# Patient Record
Sex: FEMALE | Race: WHITE | NOT HISPANIC OR LATINO | Employment: FULL TIME | URBAN - METROPOLITAN AREA
[De-identification: names, ages, dates, MRNs, and addresses within clinical notes are randomized per-mention and may not be internally consistent; named-entity substitution may affect disease eponyms.]

---

## 2017-11-16 PROBLEM — C50.811 MALIGNANT NEOPLASM OF OVERLAPPING SITES OF BOTH BREASTS IN FEMALE, ESTROGEN RECEPTOR POSITIVE: Status: ACTIVE | Noted: 2017-11-16

## 2017-11-16 PROBLEM — Z17.0 MALIGNANT NEOPLASM OF OVERLAPPING SITES OF BOTH BREASTS IN FEMALE, ESTROGEN RECEPTOR POSITIVE: Status: ACTIVE | Noted: 2017-11-16

## 2017-11-16 PROBLEM — C50.812 MALIGNANT NEOPLASM OF OVERLAPPING SITES OF BOTH BREASTS IN FEMALE, ESTROGEN RECEPTOR POSITIVE: Status: ACTIVE | Noted: 2017-11-16

## 2017-12-21 ENCOUNTER — TELEPHONE (OUTPATIENT)
Dept: HEMATOLOGY/ONCOLOGY | Facility: CLINIC | Age: 62
End: 2017-12-21

## 2017-12-21 NOTE — TELEPHONE ENCOUNTER
----- Message from Tootie Marquez sent at 12/21/2017 11:29 AM CST -----  Contact: pt  Pt calling states to see it the office got her referral from surgeon Dr Cardenas....[phone]

## 2017-12-22 ENCOUNTER — OFFICE VISIT (OUTPATIENT)
Dept: HEMATOLOGY/ONCOLOGY | Facility: CLINIC | Age: 62
End: 2017-12-22
Payer: COMMERCIAL

## 2017-12-22 VITALS
SYSTOLIC BLOOD PRESSURE: 132 MMHG | HEART RATE: 83 BPM | DIASTOLIC BLOOD PRESSURE: 78 MMHG | RESPIRATION RATE: 16 BRPM | HEIGHT: 64 IN | WEIGHT: 183.19 LBS | TEMPERATURE: 98 F | BODY MASS INDEX: 31.27 KG/M2

## 2017-12-22 DIAGNOSIS — C50.011 MALIGNANT NEOPLASM OF NIPPLE OF RIGHT BREAST IN FEMALE, UNSPECIFIED ESTROGEN RECEPTOR STATUS: Primary | ICD-10-CM

## 2017-12-22 DIAGNOSIS — T45.1X5A ANTINEOPLASTIC CHEMOTHERAPY INDUCED ANEMIA: ICD-10-CM

## 2017-12-22 DIAGNOSIS — T45.1X5A CHEMOTHERAPY INDUCED NEUTROPENIA: ICD-10-CM

## 2017-12-22 DIAGNOSIS — C50.811 MALIGNANT NEOPLASM OF OVERLAPPING SITES OF RIGHT FEMALE BREAST, UNSPECIFIED ESTROGEN RECEPTOR STATUS: Primary | ICD-10-CM

## 2017-12-22 DIAGNOSIS — D70.1 CHEMOTHERAPY INDUCED NEUTROPENIA: ICD-10-CM

## 2017-12-22 DIAGNOSIS — E78.00 PURE HYPERCHOLESTEROLEMIA: ICD-10-CM

## 2017-12-22 DIAGNOSIS — Z17.0 MALIGNANT NEOPLASM OF OVERLAPPING SITES OF BOTH BREASTS IN FEMALE, ESTROGEN RECEPTOR POSITIVE: ICD-10-CM

## 2017-12-22 DIAGNOSIS — D64.81 ANTINEOPLASTIC CHEMOTHERAPY INDUCED ANEMIA: ICD-10-CM

## 2017-12-22 DIAGNOSIS — I10 BENIGN ESSENTIAL HTN: ICD-10-CM

## 2017-12-22 DIAGNOSIS — C50.811 MALIGNANT NEOPLASM OF OVERLAPPING SITES OF BOTH BREASTS IN FEMALE, ESTROGEN RECEPTOR POSITIVE: ICD-10-CM

## 2017-12-22 DIAGNOSIS — C50.812 MALIGNANT NEOPLASM OF OVERLAPPING SITES OF BOTH BREASTS IN FEMALE, ESTROGEN RECEPTOR POSITIVE: ICD-10-CM

## 2017-12-22 PROCEDURE — 99999 PR PBB SHADOW E&M-EST. PATIENT-LVL III: CPT | Mod: PBBFAC,,, | Performed by: INTERNAL MEDICINE

## 2017-12-22 PROCEDURE — 99205 OFFICE O/P NEW HI 60 MIN: CPT | Mod: S$GLB,,, | Performed by: INTERNAL MEDICINE

## 2017-12-22 RX ORDER — PROCHLORPERAZINE MALEATE 10 MG
10 TABLET ORAL EVERY 6 HOURS PRN
Qty: 30 TABLET | Refills: 1 | Status: SHIPPED | OUTPATIENT
Start: 2017-12-22 | End: 2017-12-22 | Stop reason: SDUPTHER

## 2017-12-22 RX ORDER — PROCHLORPERAZINE MALEATE 10 MG
10 TABLET ORAL EVERY 6 HOURS PRN
Qty: 30 TABLET | Refills: 1 | Status: SHIPPED | OUTPATIENT
Start: 2017-12-22 | End: 2017-12-29

## 2017-12-22 RX ORDER — ONDANSETRON 8 MG/1
8 TABLET, ORALLY DISINTEGRATING ORAL EVERY 12 HOURS PRN
Qty: 30 TABLET | Refills: 1 | Status: SHIPPED | OUTPATIENT
Start: 2017-12-22 | End: 2017-12-22 | Stop reason: SDUPTHER

## 2017-12-22 RX ORDER — ONDANSETRON 8 MG/1
8 TABLET, ORALLY DISINTEGRATING ORAL EVERY 12 HOURS PRN
Qty: 30 TABLET | Refills: 1 | Status: SHIPPED | OUTPATIENT
Start: 2017-12-22 | End: 2017-12-29

## 2017-12-22 RX ORDER — LIDOCAINE AND PRILOCAINE 25; 25 MG/G; MG/G
CREAM TOPICAL
Qty: 5 G | Refills: 0 | Status: SHIPPED | OUTPATIENT
Start: 2017-12-22 | End: 2018-01-05 | Stop reason: SDUPTHER

## 2017-12-22 RX ORDER — IBUPROFEN 800 MG/1
800 TABLET ORAL NIGHTLY PRN
Status: ON HOLD | COMMUNITY
End: 2018-10-18 | Stop reason: HOSPADM

## 2017-12-22 NOTE — LETTER
December 22, 2017      Monika Cardenas MD  [address]  [facility] [address]           [facility]  [address]  [facility] [address]  Phone: [phone]  Fax: [phone]          Patient: Vilma Cross   MR Number: [id number]   YOB: 1955   Date of Visit: 12/22/2017       Dear Dr. Monika Cardenas:    Thank you for referring Vilma Cross to me for evaluation. Attached you will find relevant portions of my assessment and plan of care.    If you have questions, please do not hesitate to call me. I look forward to following Vilma Cross along with you.    Sincerely,    Britney Mcintosh MD    Enclosure  CC:  No Recipients    If you would like to receive this communication electronically, please contact [email or web address] or [phone] to request more information on [employer] Link access.    For providers and/or their staff who would like to refer a patient to [facility], please contact us through our one-stop-shop provider referral line, [facility], at [phone].    If you feel you have received this communication in error or would no longer like to receive these types of communications, please e-mail [email or web address]

## 2017-12-23 NOTE — PROGRESS NOTES
Date of Service: 12/22/2017  Ga Cross is 62 years of age  woman here with her family for   consultation for newly diagnosed breast cancer.  The patient had self   examination and found a lump.  She has undergone bilateral mastectomies with Dr. Cardenas.  Pathology reveals three sentinel lymph nodes on the right side, one   of which showed micrometastases size of 0.8 mm without extranodal extension.    The patient shows invasive ductal carcinoma 1.4 cm with low-grade ductal   carcinoma in situ on the right side.  On the left breast, the patient shows no   evidence of lymph node metastases on sentinel lymph node evaluation, focal   low-grade LCIS and DCIS on the left side, but no invasive component.  The   patient's invasive ductal carcinoma total size is about 2.3 cm, status post   mastectomy.  She stages as a T2 N1 MX.  The patient has the invasive ductal   carcinoma showing ER positivity, RI positivity, and HER-2 negativity by FISH.    She obviously has multiple questions and concerns.  She is healing well.    FAMILY HISTORY:  Noncontributory.  There is heart disease and dyslipidemia,   strokes and asthma.    SOCIAL HISTORY:  Nonsmoker, no recreational drug or alcohol use.  Occasional   social alcohol only.    SURGICAL HISTORY:  Significant for cholecystectomy, tonsillectomy and bilateral   mastectomies now.    MEDICAL HISTORY:  Significant for dyslipidemia, hypertension, mitral valve   prolapse, seasonal allergies.    MEDICATIONS:  Include Norvasc, cyclobenzaprine, HydroDIURIL, ibuprofen.    ALLERGIES:  She is allergic to hydromorphone and penicillin.    PHYSICAL EXAMINATION:   VITAL SIGNS:  Reveals the patient with 183 pound weight.  BSA 1.94, height of 5   feet 4 inches, blood pressure 132/78, pulse 83, respirations 16, temperature   98.3.  Pain score of 5, weight 183 pounds.  GENERAL:  Awake, alert, oriented.  BREASTS:  Double mastectomy is healing well.  LUNGS:  Clear to auscultation.  No JVD.   Trachea is central.  CVS:  S1, S2 normally heard.  No murmurs or gallops.  ABDOMEN:  Soft.  No rebound, guarding or rigidity.  EXTREMITIES:  Without edema.  NEUROLOGICAL:  The patient is appropriate.    LABS:  Labs from 12/05/2017 shows CBC is intact.  BMP is within normal range,   acceptable blood sugar, potassium slightly low at 3.3.    PATHOLOGY:  As mentioned above.    IMPRESSION:  T2 N1 MX breast cancer with DCIS on left side and invasive   component on the right side.  ER/TN positive, HER-2 negative.    PLAN:  We will obtain PET scan in 2-3 weeks, start AC x4, followed by Taxol x12   after which she will go through radiation with Arimidex.  I will see the patient   in time for start date, chart prepared.  The patient undergo chemo class.  Port   needs to be placed.  Plans made.  Plan on starting in the next two to three   weeks.      SSS/HN  dd: 12/22/2017 16:37:15 (CST)  td: 12/23/2017 06:48:33 (CST)  Doc ID   #[id number]  Job ID #[id number]    CC:

## 2018-01-03 ENCOUNTER — TELEPHONE (OUTPATIENT)
Dept: HEMATOLOGY/ONCOLOGY | Facility: CLINIC | Age: 63
End: 2018-01-03

## 2018-01-03 ENCOUNTER — TELEPHONE (OUTPATIENT)
Dept: INFUSION THERAPY | Facility: HOSPITAL | Age: 63
End: 2018-01-03

## 2018-01-03 DIAGNOSIS — Z17.0 MALIGNANT NEOPLASM OF OVERLAPPING SITES OF RIGHT BREAST IN FEMALE, ESTROGEN RECEPTOR POSITIVE: Primary | ICD-10-CM

## 2018-01-03 DIAGNOSIS — C50.811 MALIGNANT NEOPLASM OF OVERLAPPING SITES OF RIGHT BREAST IN FEMALE, ESTROGEN RECEPTOR POSITIVE: Primary | ICD-10-CM

## 2018-01-03 NOTE — TELEPHONE ENCOUNTER
Spoke with pt regarding plans for echo, labs, chemo class, clinic visit and chemo.  Dates and time provided.  Verbalized understanding.

## 2018-01-03 NOTE — TELEPHONE ENCOUNTER
EDWIGEM to return call to Kelsie [phone].  Need to schedule chemo school and f/u on port placement.

## 2018-01-03 NOTE — TELEPHONE ENCOUNTER
-Debbie HIGHTOWER this am, will follow up this afternoon with another phone call.      ---- Message from Louise Ram sent at 1/3/2018  9:28 AM CST -----  Contact: patient  Vilma, patient [phone], calling about scheduling her next appointment after PET Scan. Please advise. Thanks.

## 2018-01-03 NOTE — TELEPHONE ENCOUNTER
F/U with Mrs. Cross on port placement of 1/2  - stated all went great.  Scheduled Chemo class on 1/10 - will check to see if chemo can be done on 1/11 and notfiy patient.  Pt verbalized understanding.

## 2018-01-03 NOTE — TELEPHONE ENCOUNTER
[1/3/2018 1:11 PM] Meli Menezes:   Can you work Vilma Lampo [phone] in on Thursday 1/11?  cycle 1 day1  [1/3/2018 1:12 PM] Meli Menezes:   then for Neulasta on 1/12?  [1/3/2018 1:18 PM] Meli Menezes:   could she possibly come around 12N or 12:30 for chemo or is that too late  [1/3/2018 1:23 PM] Ching Sams:   I don't see a referral in system for vilma thaoo [id number] i can get her in for 11:30 01/11/17 what is she getting due to no orders in computer   [1/3/2018 1:24 PM] Meli Menezes:   sorry I will drop a referral.  she is getting AC with Cytoxan

## 2018-01-03 NOTE — TELEPHONE ENCOUNTER
----- Message from Louise Ram sent at 1/3/2018  9:28 AM CST -----  Contact: patient  Vilma, patient [phone], calling about scheduling her next appointment after PET Scan. Please advise. Thanks.

## 2018-01-05 ENCOUNTER — TELEPHONE (OUTPATIENT)
Dept: PHARMACY | Facility: AMBULARY SURGERY CENTER | Age: 63
End: 2018-01-05

## 2018-01-05 DIAGNOSIS — C50.011 MALIGNANT NEOPLASM OF NIPPLE OF RIGHT BREAST IN FEMALE, UNSPECIFIED ESTROGEN RECEPTOR STATUS: ICD-10-CM

## 2018-01-05 NOTE — TELEPHONE ENCOUNTER
----- Message from Ching Sams sent at 1/3/2018  1:27 PM CST -----  Pt is schedule for new tx 01/11/18  Treatment plan to be put in

## 2018-01-05 NOTE — TELEPHONE ENCOUNTER
Pt rtc, Dr. Alarcon will decide on Tuesday if pt needs additional surgery for expanders.  Pt will notify me if she needs to cancel or continue appts. On 1/10 and 1/11.  Verbalized understanding

## 2018-01-08 RX ORDER — LIDOCAINE AND PRILOCAINE 25; 25 MG/G; MG/G
CREAM TOPICAL
Qty: 5 G | Refills: 0 | Status: SHIPPED | OUTPATIENT
Start: 2018-01-08 | End: 2018-07-11 | Stop reason: CLARIF

## 2018-01-10 ENCOUNTER — INFUSION (OUTPATIENT)
Dept: INFUSION THERAPY | Facility: HOSPITAL | Age: 63
End: 2018-01-10
Attending: INTERNAL MEDICINE
Payer: COMMERCIAL

## 2018-01-10 ENCOUNTER — DOCUMENTATION ONLY (OUTPATIENT)
Dept: HEMATOLOGY/ONCOLOGY | Facility: CLINIC | Age: 63
End: 2018-01-10

## 2018-01-10 ENCOUNTER — DOCUMENTATION ONLY (OUTPATIENT)
Dept: INFUSION THERAPY | Facility: HOSPITAL | Age: 63
End: 2018-01-10

## 2018-01-10 DIAGNOSIS — Z17.0 MALIGNANT NEOPLASM OF OVERLAPPING SITES OF RIGHT BREAST IN FEMALE, ESTROGEN RECEPTOR POSITIVE: Primary | ICD-10-CM

## 2018-01-10 DIAGNOSIS — C50.811 MALIGNANT NEOPLASM OF OVERLAPPING SITES OF RIGHT BREAST IN FEMALE, ESTROGEN RECEPTOR POSITIVE: Primary | ICD-10-CM

## 2018-01-10 NOTE — PROGRESS NOTES
Nutrition: Met with patient for chemo new patient education. Pt did not complete nutrition screen however patient is not currently a nutritional risk. Patient informed me she has adopted a healthier lifestyle and eating healthier foods. Patient informed me she plans on continuing to eat healthy. Wt: 181# Patient has lost 15#s. Discussed the importance of weight maintenance and nutrition during treatment. Discussed eating a protein rich diet. Discussed nausea MGT, Bowel MGT, and food safety. Patient verbalized understanding of discussed information. Will follow up at cycle 2. Annalisa Poon, MS, RD, LDN

## 2018-01-10 NOTE — PROGRESS NOTES
Chemo Class today, reviewed medications, s/e, dietician and SW spoke with patient.  Clarified treatment with Dr. Mcintosh...AC x 4 (q14d unless count drops then possible change to Q21d) Taxol X 12 (weekly) then RX with Arimidex.  Consents signed, pt verbalized understanding.

## 2018-01-10 NOTE — PROGRESS NOTES
:     LCSW met with pt for social service aspect of chemo school. Provided [facility]     Distress Screening:    Patient did complete distress screening tool:  Distress Score    Distress Score: 1        Practical Problems Physical Problems   : No Appearance: No   Housing: No Bathing / Dressing: No   Insurance / Financial: Yes Breathing: No    Transportation: No  Changes in Urination: No    Work / School: Yes  Constipation: No   Treatment Decisions: No  Diarrhea: No     Eating: No    Family Problems Fatigue: No    Dealing with Children: No Feeling Swollen: No    Dealing with Partner: No Fevers: No    Ability to Have Children: No  Getting Around: No    Family Health Issues: No  Indigestion: No     Memory / Concentration: No   Emotional Problems Mouth Sores: No    Depression: No  Nausea: No    Fears: No  Nose Dry / Congested: No    Nervousness: No  Pain: No    Sadness: No Sexual: No    Worry: No Skin Dry / Itchy: No    Loss of Interest in Usual Activities: No Sleep: No     Substance Abuse: No    Spiritual/Religions Concerns Tingling in Hands / Feet: No   Spritual / Yazidi Concerns: No         Other Problems            LCSW provided support and acknowledged challenges. LCSW provided information on support groups.   Patient did express interest in [facility] [place].    LCSW also encouraged patient to utilize on line and phone support services as well as offered to be available to patient for support.    Family/Social/Spiritual Support:  Spouse is supportive.    Pt currently on FMLA. Pt states employer is working with pt to create a plan for supporting her return to work.     Patient sites  positive attitude as aspect of her strength.     During the past two weeks, the patient has been bothered by feeling down, depression, irritability, or hopelessness: Not at all    During the past two weeks, the patient has had little interest in doing things: Not  at all    Plan:  1. Patient indicated interest in [facility]. LCSW will enroll pt  2. Pt will request wig fitting at later date.  3. Provided pt with tour of infusion suite.    Kaya Preston LCSW

## 2018-01-11 ENCOUNTER — INFUSION (OUTPATIENT)
Dept: INFUSION THERAPY | Facility: HOSPITAL | Age: 63
End: 2018-01-11
Attending: INTERNAL MEDICINE
Payer: COMMERCIAL

## 2018-01-11 ENCOUNTER — OFFICE VISIT (OUTPATIENT)
Dept: HEMATOLOGY/ONCOLOGY | Facility: CLINIC | Age: 63
End: 2018-01-11
Payer: COMMERCIAL

## 2018-01-11 VITALS
DIASTOLIC BLOOD PRESSURE: 72 MMHG | SYSTOLIC BLOOD PRESSURE: 129 MMHG | TEMPERATURE: 98 F | HEART RATE: 73 BPM | RESPIRATION RATE: 16 BRPM

## 2018-01-11 VITALS
BODY MASS INDEX: 31.39 KG/M2 | WEIGHT: 183.88 LBS | HEIGHT: 64 IN | SYSTOLIC BLOOD PRESSURE: 135 MMHG | DIASTOLIC BLOOD PRESSURE: 70 MMHG | TEMPERATURE: 98 F | HEART RATE: 79 BPM | RESPIRATION RATE: 16 BRPM

## 2018-01-11 DIAGNOSIS — C50.812 MALIGNANT NEOPLASM OF OVERLAPPING SITES OF BOTH BREASTS IN FEMALE, ESTROGEN RECEPTOR POSITIVE: Primary | ICD-10-CM

## 2018-01-11 DIAGNOSIS — Z17.0 MALIGNANT NEOPLASM OF OVERLAPPING SITES OF BOTH BREASTS IN FEMALE, ESTROGEN RECEPTOR POSITIVE: Primary | ICD-10-CM

## 2018-01-11 DIAGNOSIS — C50.812 MALIGNANT NEOPLASM OF OVERLAPPING SITES OF BOTH BREASTS IN FEMALE, ESTROGEN RECEPTOR POSITIVE: ICD-10-CM

## 2018-01-11 DIAGNOSIS — C50.011 MALIGNANT NEOPLASM OF NIPPLE OF RIGHT BREAST IN FEMALE, UNSPECIFIED ESTROGEN RECEPTOR STATUS: ICD-10-CM

## 2018-01-11 DIAGNOSIS — T45.1X5A CHEMOTHERAPY INDUCED NEUTROPENIA: Primary | ICD-10-CM

## 2018-01-11 DIAGNOSIS — C50.811 MALIGNANT NEOPLASM OF OVERLAPPING SITES OF BOTH BREASTS IN FEMALE, ESTROGEN RECEPTOR POSITIVE: ICD-10-CM

## 2018-01-11 DIAGNOSIS — T45.1X5A ANTINEOPLASTIC CHEMOTHERAPY INDUCED ANEMIA: ICD-10-CM

## 2018-01-11 DIAGNOSIS — D64.81 ANTINEOPLASTIC CHEMOTHERAPY INDUCED ANEMIA: ICD-10-CM

## 2018-01-11 DIAGNOSIS — D70.1 CHEMOTHERAPY INDUCED NEUTROPENIA: Primary | ICD-10-CM

## 2018-01-11 DIAGNOSIS — Z17.0 MALIGNANT NEOPLASM OF OVERLAPPING SITES OF BOTH BREASTS IN FEMALE, ESTROGEN RECEPTOR POSITIVE: ICD-10-CM

## 2018-01-11 DIAGNOSIS — C50.811 MALIGNANT NEOPLASM OF OVERLAPPING SITES OF BOTH BREASTS IN FEMALE, ESTROGEN RECEPTOR POSITIVE: Primary | ICD-10-CM

## 2018-01-11 PROCEDURE — 99999 PR PBB SHADOW E&M-EST. PATIENT-LVL III: CPT | Mod: PBBFAC,,, | Performed by: INTERNAL MEDICINE

## 2018-01-11 PROCEDURE — 99215 OFFICE O/P EST HI 40 MIN: CPT | Mod: S$GLB,,, | Performed by: INTERNAL MEDICINE

## 2018-01-11 PROCEDURE — 96413 CHEMO IV INFUSION 1 HR: CPT | Mod: PN

## 2018-01-11 PROCEDURE — 96411 CHEMO IV PUSH ADDL DRUG: CPT | Mod: PN

## 2018-01-11 PROCEDURE — A4216 STERILE WATER/SALINE, 10 ML: HCPCS | Mod: PN | Performed by: INTERNAL MEDICINE

## 2018-01-11 PROCEDURE — 63600175 PHARM REV CODE 636 W HCPCS: Mod: TB,PN | Performed by: INTERNAL MEDICINE

## 2018-01-11 PROCEDURE — 96367 TX/PROPH/DG ADDL SEQ IV INF: CPT | Mod: PN

## 2018-01-11 PROCEDURE — 25000003 PHARM REV CODE 250: Mod: PN | Performed by: INTERNAL MEDICINE

## 2018-01-11 RX ORDER — ONDANSETRON 8 MG/1
8 TABLET, ORALLY DISINTEGRATING ORAL EVERY 12 HOURS PRN
Refills: 1 | COMMUNITY
Start: 2018-01-08 | End: 2018-03-15

## 2018-01-11 RX ORDER — SODIUM CHLORIDE 0.9 % (FLUSH) 0.9 %
10 SYRINGE (ML) INJECTION
Status: CANCELLED | OUTPATIENT
Start: 2018-01-11

## 2018-01-11 RX ORDER — DOXORUBICIN HYDROCHLORIDE 2 MG/ML
60 INJECTION, SOLUTION INTRAVENOUS
Status: CANCELLED | OUTPATIENT
Start: 2018-01-11

## 2018-01-11 RX ORDER — HEPARIN 100 UNIT/ML
500 SYRINGE INTRAVENOUS
Status: DISCONTINUED | OUTPATIENT
Start: 2018-01-11 | End: 2018-01-11 | Stop reason: HOSPADM

## 2018-01-11 RX ORDER — DOXORUBICIN HYDROCHLORIDE 2 MG/ML
60 INJECTION, SOLUTION INTRAVENOUS
Status: COMPLETED | OUTPATIENT
Start: 2018-01-11 | End: 2018-01-11

## 2018-01-11 RX ORDER — ONDANSETRON HYDROCHLORIDE 8 MG/1
8 TABLET, FILM COATED ORAL EVERY 8 HOURS PRN
COMMUNITY
End: 2020-06-03

## 2018-01-11 RX ORDER — HEPARIN 100 UNIT/ML
500 SYRINGE INTRAVENOUS
Status: CANCELLED | OUTPATIENT
Start: 2018-01-11

## 2018-01-11 RX ORDER — SODIUM CHLORIDE 0.9 % (FLUSH) 0.9 %
10 SYRINGE (ML) INJECTION
Status: DISCONTINUED | OUTPATIENT
Start: 2018-01-11 | End: 2018-01-11 | Stop reason: HOSPADM

## 2018-01-11 RX ORDER — PROCHLORPERAZINE MALEATE 10 MG
10 TABLET ORAL DAILY PRN
COMMUNITY
End: 2020-06-03

## 2018-01-11 RX ADMIN — DOXORUBICIN HYDROCHLORIDE 116 MG: 2 INJECTION, SOLUTION INTRAVENOUS at 02:01

## 2018-01-11 RX ADMIN — SODIUM CHLORIDE 150 MG: 9 INJECTION, SOLUTION INTRAVENOUS at 12:01

## 2018-01-11 RX ADMIN — SODIUM CHLORIDE, PRESERVATIVE FREE 10 ML: 5 INJECTION INTRAVENOUS at 03:01

## 2018-01-11 RX ADMIN — CYCLOPHOSPHAMIDE 1165 MG: 1 INJECTION, POWDER, FOR SOLUTION INTRAVENOUS; ORAL at 02:01

## 2018-01-11 RX ADMIN — SODIUM CHLORIDE: 0.9 INJECTION, SOLUTION INTRAVENOUS at 12:01

## 2018-01-11 RX ADMIN — PALONOSETRON HYDROCHLORIDE: 0.25 INJECTION INTRAVENOUS at 01:01

## 2018-01-11 NOTE — PROGRESS NOTES
Date of Service: 12/22/2017  Ga Cross is 62 years of age  woman  for newly diagnosed breast cancer.start of AC chemotherapy.  The patient had self   examination and found a lump.  She has undergone bilateral mastectomies with Dr. Cardenas.  Pathology reveals three sentinel lymph nodes on the right side, one   of which showed micrometastases size of 0.8 mm without extranodal extension.    The patient shows invasive ductal carcinoma 1.4 cm with low-grade ductal   carcinoma in situ on the right side.  On the left breast, the patient shows no   evidence of lymph node metastases on sentinel lymph node evaluation, focal   low-grade LCIS and DCIS on the left side, but no invasive component.  The   patient's invasive ductal carcinoma total size is about 2.3 cm, status post   mastectomy.  She stages as a T2 N1 MX.  The patient has the invasive ductal   carcinoma showing ER positivity, ID positivity, and HER-2 negativity by FISH.    Has seen xrt in consult, DR. Graham    FAMILY HISTORY:  Noncontributory.  There is heart disease and dyslipidemia,   strokes and asthma.    SOCIAL HISTORY:  Nonsmoker, no recreational drug or alcohol use.  Occasional   social alcohol only.    SURGICAL HISTORY:  Significant for cholecystectomy, tonsillectomy and bilateral   mastectomies now.    MEDICAL HISTORY:  Significant for dyslipidemia, hypertension, mitral valve   prolapse, seasonal allergies.    MEDICATIONS:  Include Norvasc, cyclobenzaprine, HydroDIURIL, ibuprofen.    ALLERGIES:  She is allergic to hydromorphone and penicillin.    PHYSICAL EXAMINATION:   VITAL SIGNS:  Reveals the patient with 183 pound weight.  BSA 1.94, height of 5   feet 4 inches, no pain.  Wt Readings from Last 3 Encounters:   01/11/18 83.4 kg (183 lb 13.8 oz)   01/02/18 84.6 kg (186 lb 8.2 oz)   12/22/17 83.1 kg (183 lb 3.2 oz)     Temp Readings from Last 3 Encounters:   01/11/18 98.2 °F (36.8 °C)   01/02/18 98 °F (36.7 °C) (Skin)   12/22/17 98.3 °F  (36.8 °C)     BP Readings from Last 3 Encounters:   01/11/18 135/70   01/02/18 122/64   12/22/17 132/78     Pulse Readings from Last 3 Encounters:   01/11/18 79   01/02/18 71   12/22/17 83   GENERAL:  Awake, alert, oriented.  BREASTS:  Double mastectomy is healing well.  LUNGS:  Clear to auscultation.  No JVD.  Trachea is central.  CVS:  S1, S2 normally heard.  No murmurs or gallops.  ABDOMEN:  Soft.  No rebound, guarding or rigidity.  EXTREMITIES:  Without edema.  NEUROLOGICAL:  The patient is appropriate.    LABS:  Labs from   Lab Results   Component Value Date    WBC 7.11 01/10/2018    HGB 13.7 01/10/2018    HCT 41.7 01/10/2018    MCV 88 01/10/2018     01/10/2018     CMP  Sodium   Date Value Ref Range Status   01/10/2018 142 136 - 145 mmol/L Final     Potassium   Date Value Ref Range Status   01/10/2018 3.5 3.5 - 5.1 mmol/L Final     Chloride   Date Value Ref Range Status   01/10/2018 98 95 - 110 mmol/L Final     CO2   Date Value Ref Range Status   01/10/2018 31 22 - 31 mmol/L Final     Glucose   Date Value Ref Range Status   01/10/2018 107 70 - 110 mg/dL Final     Comment:     The ADA recommends the following guidelines for fasting glucose:  Normal:       less than 100 mg/dL  Prediabetes:  100 mg/dL to 125 mg/dL  Diabetes:     126 mg/dL or higher       BUN, Bld   Date Value Ref Range Status   01/10/2018 15 7 - 18 mg/dL Final     Creatinine   Date Value Ref Range Status   01/10/2018 0.73 0.50 - 1.40 mg/dL Final     Calcium   Date Value Ref Range Status   01/10/2018 9.5 8.4 - 10.2 mg/dL Final     Total Protein   Date Value Ref Range Status   01/10/2018 7.0 6.0 - 8.4 g/dL Final     Albumin   Date Value Ref Range Status   01/10/2018 4.2 3.5 - 5.2 g/dL Final     Total Bilirubin   Date Value Ref Range Status   01/10/2018 0.5 0.2 - 1.3 mg/dL Final     Alkaline Phosphatase   Date Value Ref Range Status   01/10/2018 71 38 - 145 U/L Final     AST ([place])   Date Value Ref Range Status   01/18/2016 22 14 -  36 U/L Final     AST   Date Value Ref Range Status   01/10/2018 22 14 - 36 U/L Final     ALT   Date Value Ref Range Status   01/10/2018 34 10 - 44 U/L Final     Anion Gap   Date Value Ref Range Status   01/10/2018 13 8 - 16 mmol/L Final     eGFR if    Date Value Ref Range Status   01/10/2018 >60 >60 mL/min/1.73 m^2 Final     eGFR if non    Date Value Ref Range Status   01/10/2018 >60 >60 mL/min/1.73 m^2 Final     Comment:     Calculation used to obtain the estimated glomerular filtration  rate (eGFR) is the CKD-EPI equation.      PATHOLOGY:  As mentioned above.    IMPRESSION:  T2 N1 MX breast cancer with DCIS on left side and invasive   component on the right side.  ER/UT positive, HER-2 negative.    PLAN:    PET scheduled for next Tuesday   porceed with dose dense AC cycle #1 with premeds as ordered  neulasta support for neutropenia prevention from chemotharpy   satisfactory chemo class per pt   rtc 2 weeks with cbc, cmp for cycle #2 after which she will receive 12 weeks of taxol then xrt+arimidex

## 2018-01-12 ENCOUNTER — INFUSION (OUTPATIENT)
Dept: INFUSION THERAPY | Facility: HOSPITAL | Age: 63
End: 2018-01-12
Attending: INTERNAL MEDICINE
Payer: COMMERCIAL

## 2018-01-12 VITALS
OXYGEN SATURATION: 99 % | DIASTOLIC BLOOD PRESSURE: 73 MMHG | RESPIRATION RATE: 16 BRPM | HEART RATE: 73 BPM | TEMPERATURE: 98 F | SYSTOLIC BLOOD PRESSURE: 129 MMHG

## 2018-01-12 DIAGNOSIS — C50.812 MALIGNANT NEOPLASM OF OVERLAPPING SITES OF BOTH BREASTS IN FEMALE, ESTROGEN RECEPTOR POSITIVE: ICD-10-CM

## 2018-01-12 DIAGNOSIS — T45.1X5A CHEMOTHERAPY INDUCED NEUTROPENIA: Primary | ICD-10-CM

## 2018-01-12 DIAGNOSIS — D64.81 ANTINEOPLASTIC CHEMOTHERAPY INDUCED ANEMIA: ICD-10-CM

## 2018-01-12 DIAGNOSIS — D70.1 CHEMOTHERAPY INDUCED NEUTROPENIA: Primary | ICD-10-CM

## 2018-01-12 DIAGNOSIS — C50.811 MALIGNANT NEOPLASM OF OVERLAPPING SITES OF BOTH BREASTS IN FEMALE, ESTROGEN RECEPTOR POSITIVE: ICD-10-CM

## 2018-01-12 DIAGNOSIS — Z17.0 MALIGNANT NEOPLASM OF OVERLAPPING SITES OF BOTH BREASTS IN FEMALE, ESTROGEN RECEPTOR POSITIVE: ICD-10-CM

## 2018-01-12 DIAGNOSIS — T45.1X5A ANTINEOPLASTIC CHEMOTHERAPY INDUCED ANEMIA: ICD-10-CM

## 2018-01-12 PROCEDURE — 63600175 PHARM REV CODE 636 W HCPCS: Mod: TB,PN | Performed by: INTERNAL MEDICINE

## 2018-01-12 PROCEDURE — 96372 THER/PROPH/DIAG INJ SC/IM: CPT | Mod: PN

## 2018-01-12 RX ADMIN — PEGFILGRASTIM 6 MG: 6 INJECTION SUBCUTANEOUS at 12:01

## 2018-01-12 NOTE — PLAN OF CARE
Problem: Patient Care Overview  Goal: Plan of Care Review  Outcome: Ongoing (interventions implemented as appropriate)  Pt tolerated neulasta injection without difficulty. Instructed pt to call office for questions or concerns. Pt verbalized understanding. AVS printed with pt schedule

## 2018-01-12 NOTE — PLAN OF CARE
Problem: Patient Care Overview  Goal: Plan of Care Review  Outcome: Ongoing (interventions implemented as appropriate)  Tolerated chemo infusion without any difficulty; RTC tomorrow for neulasta injection; Amb off unit independently with son

## 2018-01-16 ENCOUNTER — HOSPITAL ENCOUNTER (OUTPATIENT)
Dept: RADIOLOGY | Facility: HOSPITAL | Age: 63
Discharge: HOME | End: 2018-01-16
Attending: INTERNAL MEDICINE
Payer: COMMERCIAL

## 2018-01-16 DIAGNOSIS — C50.011 MALIGNANT NEOPLASM OF NIPPLE OF RIGHT BREAST IN FEMALE, UNSPECIFIED ESTROGEN RECEPTOR STATUS: ICD-10-CM

## 2018-01-16 PROCEDURE — A9552 F18 FDG: HCPCS | Mod: PO

## 2018-01-16 PROCEDURE — 78815 PET IMAGE W/CT SKULL-THIGH: CPT | Mod: 26,PI,, | Performed by: RADIOLOGY

## 2018-01-16 PROCEDURE — 78815 PET IMAGE W/CT SKULL-THIGH: CPT | Mod: TC,PO

## 2018-01-25 ENCOUNTER — INFUSION (OUTPATIENT)
Dept: INFUSION THERAPY | Facility: HOSPITAL | Age: 63
End: 2018-01-25
Attending: INTERNAL MEDICINE
Payer: COMMERCIAL

## 2018-01-25 ENCOUNTER — OFFICE VISIT (OUTPATIENT)
Dept: HEMATOLOGY/ONCOLOGY | Facility: CLINIC | Age: 63
End: 2018-01-25
Payer: COMMERCIAL

## 2018-01-25 ENCOUNTER — DOCUMENTATION ONLY (OUTPATIENT)
Dept: INFUSION THERAPY | Facility: HOSPITAL | Age: 63
End: 2018-01-25

## 2018-01-25 VITALS
BODY MASS INDEX: 31.59 KG/M2 | HEART RATE: 79 BPM | TEMPERATURE: 98 F | RESPIRATION RATE: 16 BRPM | OXYGEN SATURATION: 99 % | HEIGHT: 64 IN | SYSTOLIC BLOOD PRESSURE: 107 MMHG | WEIGHT: 185.06 LBS | DIASTOLIC BLOOD PRESSURE: 70 MMHG

## 2018-01-25 VITALS
RESPIRATION RATE: 20 BRPM | DIASTOLIC BLOOD PRESSURE: 77 MMHG | HEIGHT: 64 IN | WEIGHT: 185.06 LBS | BODY MASS INDEX: 31.59 KG/M2 | SYSTOLIC BLOOD PRESSURE: 130 MMHG | TEMPERATURE: 98 F | HEART RATE: 86 BPM

## 2018-01-25 DIAGNOSIS — Z17.0 MALIGNANT NEOPLASM OF OVERLAPPING SITES OF BOTH BREASTS IN FEMALE, ESTROGEN RECEPTOR POSITIVE: ICD-10-CM

## 2018-01-25 DIAGNOSIS — I10 BENIGN ESSENTIAL HTN: ICD-10-CM

## 2018-01-25 DIAGNOSIS — C50.811 MALIGNANT NEOPLASM OF OVERLAPPING SITES OF BOTH BREASTS IN FEMALE, ESTROGEN RECEPTOR POSITIVE: ICD-10-CM

## 2018-01-25 DIAGNOSIS — C50.812 MALIGNANT NEOPLASM OF OVERLAPPING SITES OF BOTH BREASTS IN FEMALE, ESTROGEN RECEPTOR POSITIVE: ICD-10-CM

## 2018-01-25 DIAGNOSIS — C50.011 MALIGNANT NEOPLASM OF NIPPLE OF RIGHT BREAST IN FEMALE, UNSPECIFIED ESTROGEN RECEPTOR STATUS: Primary | ICD-10-CM

## 2018-01-25 DIAGNOSIS — T45.1X5A CHEMOTHERAPY INDUCED NEUTROPENIA: Primary | ICD-10-CM

## 2018-01-25 DIAGNOSIS — D64.81 ANTINEOPLASTIC CHEMOTHERAPY INDUCED ANEMIA: ICD-10-CM

## 2018-01-25 DIAGNOSIS — D70.1 CHEMOTHERAPY INDUCED NEUTROPENIA: Primary | ICD-10-CM

## 2018-01-25 DIAGNOSIS — T45.1X5A ANTINEOPLASTIC CHEMOTHERAPY INDUCED ANEMIA: ICD-10-CM

## 2018-01-25 PROCEDURE — 96377 APPLICATON ON-BODY INJECTOR: CPT | Mod: PN

## 2018-01-25 PROCEDURE — 96366 THER/PROPH/DIAG IV INF ADDON: CPT | Mod: PN

## 2018-01-25 PROCEDURE — 96413 CHEMO IV INFUSION 1 HR: CPT | Mod: PN

## 2018-01-25 PROCEDURE — 99215 OFFICE O/P EST HI 40 MIN: CPT | Mod: S$GLB,,, | Performed by: INTERNAL MEDICINE

## 2018-01-25 PROCEDURE — 99999 PR PBB SHADOW E&M-EST. PATIENT-LVL III: CPT | Mod: PBBFAC,,, | Performed by: INTERNAL MEDICINE

## 2018-01-25 PROCEDURE — 96411 CHEMO IV PUSH ADDL DRUG: CPT | Mod: PN

## 2018-01-25 PROCEDURE — 96368 THER/DIAG CONCURRENT INF: CPT | Mod: PN

## 2018-01-25 PROCEDURE — 63600175 PHARM REV CODE 636 W HCPCS: Mod: PN | Performed by: INTERNAL MEDICINE

## 2018-01-25 PROCEDURE — 96367 TX/PROPH/DG ADDL SEQ IV INF: CPT | Mod: PN

## 2018-01-25 PROCEDURE — 25000003 PHARM REV CODE 250: Mod: PN | Performed by: INTERNAL MEDICINE

## 2018-01-25 PROCEDURE — A4216 STERILE WATER/SALINE, 10 ML: HCPCS | Mod: PN | Performed by: INTERNAL MEDICINE

## 2018-01-25 RX ORDER — SODIUM CHLORIDE 0.9 % (FLUSH) 0.9 %
10 SYRINGE (ML) INJECTION
Status: DISCONTINUED | OUTPATIENT
Start: 2018-01-25 | End: 2018-01-25 | Stop reason: HOSPADM

## 2018-01-25 RX ORDER — POTASSIUM CHLORIDE 14.9 MG/ML
20 INJECTION INTRAVENOUS
Status: CANCELLED
Start: 2018-01-25

## 2018-01-25 RX ORDER — HEPARIN 100 UNIT/ML
500 SYRINGE INTRAVENOUS
Status: DISCONTINUED | OUTPATIENT
Start: 2018-01-25 | End: 2018-01-25 | Stop reason: HOSPADM

## 2018-01-25 RX ORDER — SODIUM CHLORIDE 0.9 % (FLUSH) 0.9 %
10 SYRINGE (ML) INJECTION
Status: CANCELLED | OUTPATIENT
Start: 2018-01-25

## 2018-01-25 RX ORDER — DOXORUBICIN HYDROCHLORIDE 2 MG/ML
60 INJECTION, SOLUTION INTRAVENOUS
Status: CANCELLED | OUTPATIENT
Start: 2018-01-25

## 2018-01-25 RX ORDER — DOXORUBICIN HYDROCHLORIDE 2 MG/ML
60 INJECTION, SOLUTION INTRAVENOUS
Status: COMPLETED | OUTPATIENT
Start: 2018-01-25 | End: 2018-01-25

## 2018-01-25 RX ORDER — POTASSIUM CHLORIDE 14.9 MG/ML
20 INJECTION INTRAVENOUS
Status: DISCONTINUED | OUTPATIENT
Start: 2018-01-25 | End: 2018-01-25 | Stop reason: SDUPTHER

## 2018-01-25 RX ORDER — HEPARIN 100 UNIT/ML
500 SYRINGE INTRAVENOUS
Status: CANCELLED | OUTPATIENT
Start: 2018-01-25

## 2018-01-25 RX ORDER — POTASSIUM CHLORIDE 7.45 MG/ML
10 INJECTION INTRAVENOUS
Status: COMPLETED | OUTPATIENT
Start: 2018-01-25 | End: 2018-01-25

## 2018-01-25 RX ADMIN — DOXORUBICIN HYDROCHLORIDE 116 MG: 2 INJECTION, SOLUTION INTRAVENOUS at 03:01

## 2018-01-25 RX ADMIN — POTASSIUM CHLORIDE 10 MEQ: 10 INJECTION, SOLUTION INTRAVENOUS at 03:01

## 2018-01-25 RX ADMIN — POTASSIUM CHLORIDE 10 MEQ: 10 INJECTION, SOLUTION INTRAVENOUS at 04:01

## 2018-01-25 RX ADMIN — SODIUM CHLORIDE 150 MG: 9 INJECTION, SOLUTION INTRAVENOUS at 02:01

## 2018-01-25 RX ADMIN — PEGFILGRASTIM 6 MG: KIT SUBCUTANEOUS at 04:01

## 2018-01-25 RX ADMIN — CYCLOPHOSPHAMIDE 1165 MG: 1 INJECTION, POWDER, FOR SOLUTION INTRAVENOUS; ORAL at 03:01

## 2018-01-25 RX ADMIN — PALONOSETRON HYDROCHLORIDE: 0.25 INJECTION INTRAVENOUS at 02:01

## 2018-01-25 RX ADMIN — SODIUM CHLORIDE: 900 INJECTION, SOLUTION INTRAVENOUS at 02:01

## 2018-01-25 RX ADMIN — SODIUM CHLORIDE, PRESERVATIVE FREE 10 ML: 5 INJECTION INTRAVENOUS at 02:01

## 2018-01-25 NOTE — PROGRESS NOTES
Date of Service: 12/22/2017  Ga Cross is 62 years of age  woman  for newly diagnosed breast cancer.start of AC chemotherapy.  The patient had self   examination and found a lump.  She has undergone bilateral mastectomies with Dr. Cardenas.  Pathology reveals three sentinel lymph nodes on the right side, one   of which showed micrometastases size of 0.8 mm without extranodal extension.    The patient shows invasive ductal carcinoma 1.4 cm with low-grade ductal   carcinoma in situ on the right side.  On the left breast, the patient shows no   evidence of lymph node metastases on sentinel lymph node evaluation, focal   low-grade LCIS and DCIS on the left side, but no invasive component.  The   patient's invasive ductal carcinoma total size is about 2.3 cm, status post   mastectomy.  She stages as a T2 N1 MX.  The patient has the invasive ductal   carcinoma showing ER positivity, CA positivity, and HER-2 negativity by FISH.    Has seen xrt in consult, DR. Graham started cycle #1 , got sick on the way home, nausea+ on and off 4 days    FAMILY HISTORY:  Noncontributory.  There is heart disease and dyslipidemia,   strokes and asthma.    SOCIAL HISTORY:  Nonsmoker, no recreational drug or alcohol use.  Occasional   social alcohol only.    SURGICAL HISTORY:  Significant for cholecystectomy, tonsillectomy and bilateral   mastectomies now.    MEDICAL HISTORY:  Significant for dyslipidemia, hypertension, mitral valve   prolapse, seasonal allergies.    MEDICATIONS:  Include Norvasc, cyclobenzaprine, HydroDIURIL, ibuprofen.    ALLERGIES:  She is allergic to hydromorphone and penicillin.    PHYSICAL EXAMINATION:   VITAL SIGNS:  Reveals the patient with 185 pound weight.  BSA 1.94, height of 5   feet 4 inches, no pain.  Wt Readings from Last 3 Encounters:   01/25/18 84 kg (185 lb 1.2 oz)   01/11/18 83.4 kg (183 lb 13.8 oz)   01/02/18 84.6 kg (186 lb 8.2 oz)     Temp Readings from Last 3 Encounters:   01/25/18  98.3 °F (36.8 °C)   01/12/18 98.2 °F (36.8 °C)   01/11/18 97.7 °F (36.5 °C)     BP Readings from Last 3 Encounters:   01/25/18 130/77   01/12/18 129/73   01/11/18 129/72     Pulse Readings from Last 3 Encounters:   01/25/18 86   01/12/18 73   01/11/18 73   GENERAL:  Awake, alert, oriented.  BREASTS:  Double mastectomy is healing well.  LUNGS:  Clear to auscultation.  No JVD.  Trachea is central.  CVS:  S1, S2 normally heard.  No murmurs or gallops.  ABDOMEN:  Soft.  No rebound, guarding or rigidity.  EXTREMITIES:  Without edema.  NEUROLOGICAL:  The patient is appropriate.    LABS:  Labs from   Lab Results   Component Value Date    WBC 9.71 01/23/2018    HGB 13.1 01/23/2018    HCT 40.2 01/23/2018    MCV 89 01/23/2018     01/23/2018     CMP  Sodium   Date Value Ref Range Status   01/23/2018 142 136 - 145 mmol/L Final     Potassium   Date Value Ref Range Status   01/23/2018 3.0 (L) 3.5 - 5.1 mmol/L Final     Chloride   Date Value Ref Range Status   01/23/2018 95 95 - 110 mmol/L Final     CO2   Date Value Ref Range Status   01/23/2018 35 (H) 22 - 31 mmol/L Final     Glucose   Date Value Ref Range Status   01/23/2018 95 70 - 110 mg/dL Final     Comment:     The ADA recommends the following guidelines for fasting glucose:  Normal:       less than 100 mg/dL  Prediabetes:  100 mg/dL to 125 mg/dL  Diabetes:     126 mg/dL or higher       BUN, Bld   Date Value Ref Range Status   01/23/2018 10 7 - 18 mg/dL Final     Creatinine   Date Value Ref Range Status   01/23/2018 0.68 0.50 - 1.40 mg/dL Final     Calcium   Date Value Ref Range Status   01/23/2018 9.4 8.4 - 10.2 mg/dL Final     Total Protein   Date Value Ref Range Status   01/23/2018 7.1 6.0 - 8.4 g/dL Final     Albumin   Date Value Ref Range Status   01/23/2018 4.2 3.5 - 5.2 g/dL Final     Total Bilirubin   Date Value Ref Range Status   01/23/2018 0.4 0.2 - 1.3 mg/dL Final     Alkaline Phosphatase   Date Value Ref Range Status   01/23/2018 87 38 - 145 U/L Final      AST ([place])   Date Value Ref Range Status   01/18/2016 22 14 - 36 U/L Final     AST   Date Value Ref Range Status   01/23/2018 21 14 - 36 U/L Final     ALT   Date Value Ref Range Status   01/23/2018 40 10 - 44 U/L Final     Anion Gap   Date Value Ref Range Status   01/23/2018 12 8 - 16 mmol/L Final     eGFR if    Date Value Ref Range Status   01/23/2018 >60 >60 mL/min/1.73 m^2 Final     eGFR if non    Date Value Ref Range Status   01/23/2018 >60 >60 mL/min/1.73 m^2 Final     Comment:     Calculation used to obtain the estimated glomerular filtration  rate (eGFR) is the CKD-EPI equation.      PATHOLOGY:  As mentioned above.    IMPRESSION:  T2 N1 MX breast cancer with DCIS on left side and invasive   component on the right side.  ER/OK positive, HER-2 negative.  PET done , neg for mets  PLAN:       porceed with dose dense AC cycle #2with premeds as ordered  neulasta support for neutropenia prevention from chemotharpy   satisfactory chemo class per pt   rtc 2 weeks with cbc, cmp for cycle #3after which she will receive 12 weeks of taxol then xrt+arimidex

## 2018-01-25 NOTE — PROGRESS NOTES
Nutrition: Met with pt while she was here for chemo infusion #2. Patient is not currently a nutritional risk. PT informed me she is eating well. No issues or concerns at current. Reports being nauseated on and off after infusion #1, however she was able to control nausea with medicine. Wt: 185# Weight stable. Offered encouragement and support. Will assist if and when needed. Annalisa Poon MS, RD, LDN

## 2018-02-01 ENCOUNTER — TELEPHONE (OUTPATIENT)
Dept: HEMATOLOGY/ONCOLOGY | Facility: CLINIC | Age: 63
End: 2018-02-01

## 2018-02-01 NOTE — TELEPHONE ENCOUNTER
----- Message from Anitra Herrera sent at 2/1/2018 11:53 AM CST -----  Contact: Marnie with [facility]   Marnie with [facility] want to speak with a nurse regarding patient oncology treatment plan, please call back at [phone] she has a confidential voicemail

## 2018-02-06 DIAGNOSIS — C50.011 MALIGNANT NEOPLASM OF NIPPLE OF RIGHT BREAST IN FEMALE, UNSPECIFIED ESTROGEN RECEPTOR STATUS: ICD-10-CM

## 2018-02-06 RX ORDER — ONDANSETRON 8 MG/1
TABLET, ORALLY DISINTEGRATING ORAL
Qty: 60 TABLET | Refills: 2 | Status: SHIPPED | OUTPATIENT
Start: 2018-02-06 | End: 2018-03-15

## 2018-02-08 ENCOUNTER — INFUSION (OUTPATIENT)
Dept: INFUSION THERAPY | Facility: HOSPITAL | Age: 63
End: 2018-02-08
Attending: INTERNAL MEDICINE
Payer: COMMERCIAL

## 2018-02-08 ENCOUNTER — OFFICE VISIT (OUTPATIENT)
Dept: HEMATOLOGY/ONCOLOGY | Facility: CLINIC | Age: 63
End: 2018-02-08
Payer: COMMERCIAL

## 2018-02-08 VITALS
HEART RATE: 85 BPM | TEMPERATURE: 99 F | HEIGHT: 64 IN | SYSTOLIC BLOOD PRESSURE: 147 MMHG | RESPIRATION RATE: 19 BRPM | BODY MASS INDEX: 32.03 KG/M2 | DIASTOLIC BLOOD PRESSURE: 72 MMHG | WEIGHT: 187.63 LBS

## 2018-02-08 VITALS
WEIGHT: 187.63 LBS | RESPIRATION RATE: 17 BRPM | HEIGHT: 64 IN | TEMPERATURE: 98 F | HEART RATE: 81 BPM | DIASTOLIC BLOOD PRESSURE: 74 MMHG | BODY MASS INDEX: 32.03 KG/M2 | SYSTOLIC BLOOD PRESSURE: 123 MMHG

## 2018-02-08 DIAGNOSIS — Z17.0 MALIGNANT NEOPLASM OF OVERLAPPING SITES OF BOTH BREASTS IN FEMALE, ESTROGEN RECEPTOR POSITIVE: ICD-10-CM

## 2018-02-08 DIAGNOSIS — C50.812 MALIGNANT NEOPLASM OF OVERLAPPING SITES OF BOTH BREASTS IN FEMALE, ESTROGEN RECEPTOR POSITIVE: ICD-10-CM

## 2018-02-08 DIAGNOSIS — E87.6 HYPOKALEMIA: Primary | ICD-10-CM

## 2018-02-08 DIAGNOSIS — D70.1 CHEMOTHERAPY INDUCED NEUTROPENIA: ICD-10-CM

## 2018-02-08 DIAGNOSIS — D70.1 CHEMOTHERAPY INDUCED NEUTROPENIA: Primary | ICD-10-CM

## 2018-02-08 DIAGNOSIS — E78.00 PURE HYPERCHOLESTEROLEMIA: ICD-10-CM

## 2018-02-08 DIAGNOSIS — C50.811 MALIGNANT NEOPLASM OF OVERLAPPING SITES OF BOTH BREASTS IN FEMALE, ESTROGEN RECEPTOR POSITIVE: ICD-10-CM

## 2018-02-08 DIAGNOSIS — D64.81 ANTINEOPLASTIC CHEMOTHERAPY INDUCED ANEMIA: ICD-10-CM

## 2018-02-08 DIAGNOSIS — T45.1X5A CHEMOTHERAPY INDUCED NEUTROPENIA: Primary | ICD-10-CM

## 2018-02-08 DIAGNOSIS — T45.1X5A CHEMOTHERAPY INDUCED NEUTROPENIA: ICD-10-CM

## 2018-02-08 DIAGNOSIS — T45.1X5A ANTINEOPLASTIC CHEMOTHERAPY INDUCED ANEMIA: ICD-10-CM

## 2018-02-08 DIAGNOSIS — C50.011 MALIGNANT NEOPLASM OF NIPPLE OF RIGHT BREAST IN FEMALE, UNSPECIFIED ESTROGEN RECEPTOR STATUS: ICD-10-CM

## 2018-02-08 PROCEDURE — A4216 STERILE WATER/SALINE, 10 ML: HCPCS | Mod: PN | Performed by: INTERNAL MEDICINE

## 2018-02-08 PROCEDURE — 99213 OFFICE O/P EST LOW 20 MIN: CPT | Mod: PN,25 | Performed by: INTERNAL MEDICINE

## 2018-02-08 PROCEDURE — 96413 CHEMO IV INFUSION 1 HR: CPT | Mod: PN

## 2018-02-08 PROCEDURE — 25000003 PHARM REV CODE 250: Mod: PN | Performed by: INTERNAL MEDICINE

## 2018-02-08 PROCEDURE — 96411 CHEMO IV PUSH ADDL DRUG: CPT | Mod: PN

## 2018-02-08 PROCEDURE — 96367 TX/PROPH/DG ADDL SEQ IV INF: CPT | Mod: PN

## 2018-02-08 PROCEDURE — 99215 OFFICE O/P EST HI 40 MIN: CPT | Mod: S$GLB,,, | Performed by: INTERNAL MEDICINE

## 2018-02-08 PROCEDURE — 3008F BODY MASS INDEX DOCD: CPT | Mod: S$GLB,,, | Performed by: INTERNAL MEDICINE

## 2018-02-08 PROCEDURE — 96377 APPLICATON ON-BODY INJECTOR: CPT | Mod: PN

## 2018-02-08 PROCEDURE — 63600175 PHARM REV CODE 636 W HCPCS: Mod: TB,PN | Performed by: INTERNAL MEDICINE

## 2018-02-08 PROCEDURE — 99999 PR PBB SHADOW E&M-EST. PATIENT-LVL III: CPT | Mod: PBBFAC,,, | Performed by: INTERNAL MEDICINE

## 2018-02-08 RX ORDER — HEPARIN 100 UNIT/ML
500 SYRINGE INTRAVENOUS
Status: DISCONTINUED | OUTPATIENT
Start: 2018-02-08 | End: 2018-02-08 | Stop reason: HOSPADM

## 2018-02-08 RX ORDER — POTASSIUM CHLORIDE 20 MEQ/1
20 TABLET, EXTENDED RELEASE ORAL DAILY
Qty: 30 TABLET | Refills: 11 | Status: SHIPPED | OUTPATIENT
Start: 2018-02-08 | End: 2018-04-05 | Stop reason: SDUPTHER

## 2018-02-08 RX ORDER — DOXORUBICIN HYDROCHLORIDE 2 MG/ML
60 INJECTION, SOLUTION INTRAVENOUS
Status: CANCELLED | OUTPATIENT
Start: 2018-02-08

## 2018-02-08 RX ORDER — HEPARIN 100 UNIT/ML
500 SYRINGE INTRAVENOUS
Status: CANCELLED | OUTPATIENT
Start: 2018-02-08

## 2018-02-08 RX ORDER — DOXORUBICIN HYDROCHLORIDE 2 MG/ML
60 INJECTION, SOLUTION INTRAVENOUS
Status: COMPLETED | OUTPATIENT
Start: 2018-02-08 | End: 2018-02-08

## 2018-02-08 RX ORDER — SODIUM CHLORIDE 0.9 % (FLUSH) 0.9 %
10 SYRINGE (ML) INJECTION
Status: CANCELLED | OUTPATIENT
Start: 2018-02-08

## 2018-02-08 RX ORDER — SODIUM CHLORIDE 0.9 % (FLUSH) 0.9 %
10 SYRINGE (ML) INJECTION
Status: DISCONTINUED | OUTPATIENT
Start: 2018-02-08 | End: 2018-02-08 | Stop reason: HOSPADM

## 2018-02-08 RX ADMIN — SODIUM CHLORIDE 150 MG: 9 INJECTION, SOLUTION INTRAVENOUS at 02:02

## 2018-02-08 RX ADMIN — DOXORUBICIN HYDROCHLORIDE 116 MG: 2 INJECTION, SOLUTION INTRAVENOUS at 03:02

## 2018-02-08 RX ADMIN — PALONOSETRON HYDROCHLORIDE: 0.25 INJECTION INTRAVENOUS at 02:02

## 2018-02-08 RX ADMIN — PEGFILGRASTIM 6 MG: KIT SUBCUTANEOUS at 04:02

## 2018-02-08 RX ADMIN — SODIUM CHLORIDE: 900 INJECTION, SOLUTION INTRAVENOUS at 02:02

## 2018-02-08 RX ADMIN — CYCLOPHOSPHAMIDE 1165 MG: 1 INJECTION, POWDER, FOR SOLUTION INTRAVENOUS; ORAL at 03:02

## 2018-02-08 RX ADMIN — SODIUM CHLORIDE, PRESERVATIVE FREE 10 ML: 5 INJECTION INTRAVENOUS at 02:02

## 2018-02-08 NOTE — PATIENT INSTRUCTIONS
Doxorubicin injection  What is this medicine?  DOXORUBICIN (dox oh DAVE bi sin) is a chemotherapy drug. It is used to treat many kinds of cancer like Hodgkin's disease, leukemia, non-Hodgkin's lymphoma, neuroblastoma, sarcoma, and Wilms' tumor. It is also used to treat bladder cancer, breast cancer, lung cancer, ovarian cancer, stomach cancer, and thyroid cancer.  How should I use this medicine?  This drug is given as an infusion into a vein. It is administered in a hospital or clinic by a specially trained health care professional. If you have pain, swelling, burning or any unusual feeling around the site of your injection, tell your health care professional right away.  Talk to your pediatrician regarding the use of this medicine in children. Special care may be needed.  What side effects may I notice from receiving this medicine?  Side effects that you should report to your doctor or health care professional as soon as possible:  · allergic reactions like skin rash, itching or hives, swelling of the face, lips, or tongue  · low blood counts - this medicine may decrease the number of white blood cells, red blood cells and platelets. You may be at increased risk for infections and bleeding.  · signs of infection - fever or chills, cough, sore throat, pain or difficulty passing urine  · signs of decreased platelets or bleeding - bruising, pinpoint red spots on the skin, black, tarry stools, blood in the urine  · signs of decreased red blood cells - unusually weak or tired, fainting spells, lightheadedness  · breathing problems  · chest pain  · fast, irregular heartbeat  · mouth sores  · nausea, vomiting  · pain, swelling, redness at site where injected  · pain, tingling, numbness in the hands or feet  · swelling of ankles, feet, or hands  · unusual bleeding or bruising  Side effects that usually do not require medical attention (report to your doctor or health care professional if they continue or are  bothersome):  · diarrhea  · facial flushing  · hair loss  · loss of appetite  · missed menstrual periods  · nail discoloration or damage  · red or watery eyes  · red colored urine  · stomach upset  What may interact with this medicine?  Do not take this medicine with any of the following medications:  · cisapride  · droperidol  · halofantrine  · pimozide  · zidovudine  This medicine may also interact with the following medications:  · chloroquine  · chlorpromazine  · clarithromycin  · cyclophosphamide  · cyclosporine  · erythromycin  · medicines for depression, anxiety, or psychotic disturbances  · medicines for irregular heart beat like amiodarone, bepridil, dofetilide, encainide, flecainide, propafenone, quinidine  · medicines for seizures like ethotoin, fosphenytoin, phenytoin  · medicines for nausea, vomiting like dolasetron, ondansetron, palonosetron  · medicines to increase blood counts like filgrastim, pegfilgrastim, sargramostim  · methadone  · methotrexate  · pentamidine  · progesterone  · vaccines  · verapamil  Talk to your doctor or health care professional before taking any of these medicines:  · acetaminophen  · aspirin  · ibuprofen  · ketoprofen  · naproxen  What if I miss a dose?  It is important not to miss your dose. Call your doctor or health care professional if you are unable to keep an appointment.  Where should I keep my medicine?  This drug is given in a hospital or clinic and will not be stored at home.  What should I tell my health care provider before I take this medicine?  They need to know if you have any of these conditions:  · blood disorders  · heart disease, recent heart attack  · infection (especially a virus infection such as chickenpox, cold sores, or herpes)  · irregular heartbeat  · liver disease  · recent or ongoing radiation therapy  · an unusual or allergic reaction to doxorubicin, other chemotherapy agents, other medicines, foods, dyes, or preservatives  · pregnant or trying  to get pregnant  · breast-feeding  What should I watch for while using this medicine?  Your condition will be monitored carefully while you are receiving this medicine. You will need important blood work done while you are taking this medicine.  This drug may make you feel generally unwell. This is not uncommon, as chemotherapy can affect healthy cells as well as cancer cells. Report any side effects. Continue your course of treatment even though you feel ill unless your doctor tells you to stop.  Your urine may turn red for a few days after your dose. This is not blood. If your urine is dark or brown, call your doctor.  In some cases, you may be given additional medicines to help with side effects. Follow all directions for their use.  Call your doctor or health care professional for advice if you get a fever, chills or sore throat, or other symptoms of a cold or flu. Do not treat yourself. This drug decreases your body's ability to fight infections. Try to avoid being around people who are sick.  This medicine may increase your risk to bruise or bleed. Call your doctor or health care professional if you notice any unusual bleeding.  Be careful brushing and flossing your teeth or using a toothpick because you may get an infection or bleed more easily. If you have any dental work done, tell your dentist you are receiving this medicine.  Avoid taking products that contain aspirin, acetaminophen, ibuprofen, naproxen, or ketoprofen unless instructed by your doctor. These medicines may hide a fever.  Men and women of childbearing age should use effective birth control methods while using taking this medicine. Do not become pregnant while taking this medicine. There is a potential for serious side effects to an unborn child. Talk to your health care professional or pharmacist for more information. Do not breast-feed an infant while taking this medicine.  Do not let others touch your urine or other body fluids for 5 days  after each treatment with this medicine. Caregivers should wear latex gloves to avoid touching body fluids during this time.  There is a maximum amount of this medicine you should receive throughout your life. The amount depends on the medical condition being treated and your overall health. Your doctor will watch how much of this medicine you receive in your lifetime. Tell your doctor if you have taken this medicine before.  NOTE:This sheet is a summary. It may not cover all possible information. If you have questions about this medicine, talk to your doctor, pharmacist, or health care provider. Copyright© 2017 [employer]        Cyclophosphamide injection  What is this medicine?  CYCLOPHOSPHAMIDE (sye kloe BERNARD fa mide) is a chemotherapy drug. It slows the growth of cancer cells. This medicine is used to treat many types of cancer like lymphoma, myeloma, leukemia, breast cancer, and ovarian cancer, to name a few.  How should I use this medicine?  This drug is usually given as an injection into a vein or muscle or by infusion into a vein. It is administered in a hospital or clinic by a specially trained health care professional.  Talk to your pediatrician regarding the use of this medicine in children. Special care may be needed.  What side effects may I notice from receiving this medicine?  Side effects that you should report to your doctor or health care professional as soon as possible:  · allergic reactions like skin rash, itching or hives, swelling of the face, lips, or tongue  · low blood counts - this medicine may decrease the number of white blood cells, red blood cells and platelets. You may be at increased risk for infections and bleeding.  · signs of infection - fever or chills, cough, sore throat, pain or difficulty passing urine  · signs of decreased platelets or bleeding - bruising, pinpoint red spots on the skin, black, tarry stools, blood in the urine  · signs of decreased red blood cells -  unusually weak or tired, fainting spells, lightheadedness  · breathing problems  · dark urine  · dizziness  · palpitations  · swelling of the ankles, feet, hands  · trouble passing urine or change in the amount of urine  · weight gain  · yellowing of the eyes or skin  Side effects that usually do not require medical attention (report to your doctor or health care professional if they continue or are bothersome):  · changes in nail or skin color  · hair loss  · missed menstrual periods  · mouth sores  · nausea, vomiting  What may interact with this medicine?  This medicine may interact with the following medications:  · amiodarone  · amphotericin B  · azathioprine  · certain antiviral medicines for HIV or AIDS such as protease inhibitors (e.g., indinavir, ritonavir) and zidovudine  · certain blood pressure medications such as benazepril, captopril, enalapril, fosinopril, lisinopril, moexipril, monopril, perindopril, quinapril, ramipril, trandolapril  · certain cancer medications such as anthracyclines (e.g., daunorubicin, doxorubicin), busulfan, cytarabine, paclitaxel, pentostatin, tamoxifen, trastuzumab  · certain diuretics such as chlorothiazide, chlorthalidone, hydrochlorothiazide, indapamide, metolazone  · certain medicines that treat or prevent blood clots like warfarin  · certain muscle relaxants such as succinylcholine  · cyclosporine  · etanercept  · indomethacin  · medicines to increase blood counts like filgrastim, pegfilgrastim, sargramostim  · medicines used as general anesthesia  · metronidazole  · natalizumab  What if I miss a dose?  It is important not to miss your dose. Call your doctor or health care professional if you are unable to keep an appointment.  Where should I keep my medicine?  This drug is given in a hospital or clinic and will not be stored at home.  What should I tell my health care provider before I take this medicine?  They need to know if you have any of these conditions:  · blood  disorders  · history of other chemotherapy  · infection  · kidney disease  · liver disease  · recent or ongoing radiation therapy  · tumors in the bone marrow  · an unusual or allergic reaction to cyclophosphamide, other chemotherapy, other medicines, foods, dyes, or preservatives  · pregnant or trying to get pregnant  · breast-feeding  What should I watch for while using this medicine?  Visit your doctor for checks on your progress. This drug may make you feel generally unwell. This is not uncommon, as chemotherapy can affect healthy cells as well as cancer cells. Report any side effects. Continue your course of treatment even though you feel ill unless your doctor tells you to stop.  Drink water or other fluids as directed. Urinate often, even at night.  In some cases, you may be given additional medicines to help with side effects. Follow all directions for their use.  Call your doctor or health care professional for advice if you get a fever, chills or sore throat, or other symptoms of a cold or flu. Do not treat yourself. This drug decreases your body's ability to fight infections. Try to avoid being around people who are sick.  This medicine may increase your risk to bruise or bleed. Call your doctor or health care professional if you notice any unusual bleeding.  Be careful brushing and flossing your teeth or using a toothpick because you may get an infection or bleed more easily. If you have any dental work done, tell your dentist you are receiving this medicine.  You may get drowsy or dizzy. Do not drive, use machinery, or do anything that needs mental alertness until you know how this medicine affects you.  Do not become pregnant while taking this medicine or for 1 year after stopping it. Women should inform their doctor if they wish to become pregnant or think they might be pregnant. Men should not father a child while taking this medicine and for 4 months after stopping it. There is a potential for  serious side effects to an unborn child. Talk to your health care professional or pharmacist for more information. Do not breast-feed an infant while taking this medicine.  This medicine may interfere with the ability to have a child. This medicine has caused ovarian failure in some women. This medicine has caused reduced sperm counts in some men. You should talk with your doctor or health care professional if you are concerned about your fertility.  If you are going to have surgery, tell your doctor or health care professional that you have taken this medicine.  NOTE:This sheet is a summary. It may not cover all possible information. If you have questions about this medicine, talk to your doctor, pharmacist, or health care provider. Copyright© 2017 [employer]        Pegfilgrastim injection  What is this medicine?  PEGFILGRASTIM (PEG risa gra stim) is a long-acting granulocyte colony-stimulating factor that stimulates the growth of neutrophils, a type of white blood cell important in the body's fight against infection. It is used to reduce the incidence of fever and infection in patients with certain types of cancer who are receiving chemotherapy that affects the bone marrow, and to increase survival after being exposed to high doses of radiation.  How should I use this medicine?  This medicine is for injection under the skin. If you get this medicine at home, you will be taught how to prepare and give the pre-filled syringe or how to use the On-body Injector. Refer to the patient Instructions for Use for detailed instructions. Use exactly as directed. Take your medicine at regular intervals. Do not take your medicine more often than directed.  It is important that you put your used needles and syringes in a special sharps container. Do not put them in a trash can. If you do not have a sharps container, call your pharmacist or healthcare provider to get one.  Talk to your pediatrician regarding the use of this  medicine in children. While this drug may be prescribed for selected conditions, precautions do apply.  What side effects may I notice from receiving this medicine?  Side effects that you should report to your doctor or health care professional as soon as possible:  · allergic reactions like skin rash, itching or hives, swelling of the face, lips, or tongue  · dizziness  · fever  · pain, redness, or irritation at site where injected  · pinpoint red spots on the skin  · red or dark-brown urine  · shortness of breath or breathing problems  · stomach or side pain, or pain at the shoulder  · swelling  · tiredness  · trouble passing urine or change in the amount of urine  Side effects that usually do not require medical attention (report to your doctor or health care professional if they continue or are bothersome):  · bone pain  · muscle pain  What may interact with this medicine?  Interactions have not been studied.  Give your health care provider a list of all the medicines, herbs, non-prescription drugs, or dietary supplements you use. Also tell them if you smoke, drink alcohol, or use illegal drugs. Some items may interact with your medicine.  What if I miss a dose?  It is important not to miss your dose. Call your doctor or health care professional if you miss your dose. If you miss a dose due to an On-body Injector failure or leakage, a new dose should be administered as soon as possible using a single prefilled syringe for manual use.  Where should I keep my medicine?  Keep out of the reach of children.  Store pre-filled syringes in a refrigerator between 2 and 8 degrees C (36 and 46 degrees F). Do not freeze. Keep in carton to protect from light. Throw away this medicine if it is left out of the refrigerator for more than 48 hours. Throw away any unused medicine after the expiration date.  What should I tell my health care provider before I take this medicine?  They need to know if you have any of these  conditions:  · kidney disease  · latex allergy  · ongoing radiation therapy  · sickle cell disease  · skin reactions to acrylic adhesives (On-Body Injector only)  · an unusual or allergic reaction to pegfilgrastim, filgrastim, other medicines, foods, dyes, or preservatives  · pregnant or trying to get pregnant  · breast-feeding  What should I watch for while using this medicine?  You may need blood work done while you are taking this medicine.  If you are going to need a MRI, CT scan, or other procedure, tell your doctor that you are using this medicine (On-Body Injector only).  NOTE:This sheet is a summary. It may not cover all possible information. If you have questions about this medicine, talk to your doctor, pharmacist, or health care provider. Copyright© 2017 [employer]        Preventing Falls: Are You At Risk of Falling?     Ask for help to reduce risk of falling in your home.     As you get older, you're not as steady on your feet as you once were. And you may have health problems you didn't have when you were younger. So, it's not surprising that older people are more likely to trip and fall. Falling can be very serious. It can change your overall health and quality of life. That's why it's important to be aware of your own risk of falling.  The dangers of falling  Falls are one of the main causes of injury in people over age 65. An older person who falls may take longer to get better than a younger person. And, after a fall, an older person is more likely to have problems that don't go away. So, preventing falls can help you avoid serious health problems.  Are you at risk of falling?  Answer these questions to rate your level of risk.  · Are you a woman?  · Have you fallen or stumbled in the last year?  · Are you over age 65?  · Are you ever dizzy or lightheaded with standing?  · Do you have a hard time getting in and out of the bathtub or on and off the toilet?  · Do you lean on objects to help you get  "around? Or do you use a cane or walker?  · Do you have vision or hearing problems? For example, do you need new glasses or hearing aids?  · Do you have 2 or more long-lasting (chronic) medical conditions?  · Do you take 3 or more medicines?  · Have you felt depressed recently?  · Have you had more trouble with your memory in recent months?  · Are there hazards in your home that might cause you to fall, such as loose rugs or poor lighting?  · Do you have a pet that jumps on you or might trip you?  · Have you stopped getting regular exercise?  · Do you have diabetes?   · Do you have a neurologic disease, such as Parkinson or Alzheimer disease?   · Do you drink alcohol?  · Do you wear athletic shoes or slippers, or go barefoot at home?  You can help prevent falls  If you answered "yes" to any of the above questions, you should take steps to reduce your risk of a fall. Monitoring health conditions and keeping walkways in your home free of clutter are just 2 ways. Changing is sometimes easier said than done. But keep in mind that even small changes can make you less likely to fall.  The fear of falling  It's normal to be scared of falling, especially if you've fallen before. But being afraid can actually make you more likely to fall. This is because:  · Fear might cause you to become less active. Being less active can lead to a loss of strength and balance.  · Fear can lead to isolation from others, depression, or the use of more medicines or alcohol. And all these things make falling even more likely.  To break the cycle, learn more about ways to avoid falling. As you take control, you may find yourself feeling less afraid.   Date Last Reviewed: 6/12/2015  © 7666-1533 [employer]. [address] [place] [address], [place] [address]. All rights reserved. This information is not intended as a substitute for professional medical care. Always follow your healthcare professional's instructions.        Discharge " Instructions for Chemotherapy  Your healthcare provider prescribed a type of medicine therapy for you called chemotherapy. Healthcare providers prescribe chemotherapy for many different types of illnesses, including cancer. There are many types of chemotherapy. This sheet provides general guidelines on how you can take care of yourself after your chemotherapy.  Mouth care  Dont be discouraged if you get mouth sores, even if you are following all your healthcare providers instructions. Many people get mouth sores as a side effect of chemotherapy. Heres what you can do to prevent mouth sores:  · Keep your mouth clean. Brush your teeth with a soft-bristle toothbrush after every meal.  · Ask if you should use a toothpaste with fluoride, or a mixture of 1 teaspoon of salt in 8-ounces of water to brush your teeth.   · Use an oral swab or special soft toothbrush if your gums bleed during regular brushing.  · Don't use dental floss if it causes your gums to bleed.  · Use any mouthwashes given to you as directed.  · If you cant tolerate regular methods, use salt and baking soda to clean your mouth. Mix 1 teaspoon of salt and 1 teaspoon of baking soda into an 8-ounce glass of warm water. Swish and spit.  · If you wear dentures, you may be told to wear them only when you eat, ask your healthcare provider. Clean dentures twice a day and soak in antimicrobial solution when you aren't wearing them. Rinse your mouth after each meal.   · Watch your mouth and tongue for white patches. This may be a sign of a type of yeast infection (thrush), a common side effect of chemotherapy. Be sure to tell your healthcare provider about these patches. Medicine can be prescribed to treat it.  Other home care  Here's what else you can do:  · Try to exercise. Exercise keeps you strong and keeps your heart and lungs active. Walking and yoga are good types of exercise.   · Keep clean. During chemotherapy, your body cant fight infection very  well. Take short baths or showers.  ¨ Wash your hands before you eat and after going to the bathroom.  ¨ Use moisturizing soap. Chemotherapy can make your skin dry.  ¨ Apply moisturizing lotion several times a day to help relieve dry skin.  ¨ Dont take very hot or very cold showers or baths.  · Dont be surprised if your chemotherapy causes slight burns to your skin--usually on the hands and feet. Some medicines used in high doses cause this to happen. Ask for a special cream to help relieve the burn and protect your skin.  · Avoid people who are sick with illnesses and diseases you could catch, such as colds, flu, measles, or chicken pox as well as people who have recently had vaccinations for these illnesses.   · Let your healthcare provider know if your throat is sore. You may have an infection that needs treatment.  · Remember, many patients feel sick and lose their appetites during treatment. Eat small meals several times a day to keep your strength up:  ¨ Choose bland foods with little taste or smell if you are reacting strongly to food.  ¨ Be sure to cook all food thoroughly. This kills bacteria and helps you avoid infection.  ¨ Eat foods that are soft. Soft foods are less likely to cause stomach irritation.  ¨ Try to eat a variety of foods for a well-balanced diet. Drink plenty of fluids and eat foods with fiber to avoid constipation.      When to call your healthcare provider  Call your healthcare provider right away if you have any of the following:  · Unexplained bleeding  · Trouble concentrating  · Ongoing fatigue  · Shortness of breath, wheezing, trouble breathing, or bad cough  · Rapid, irregular heartbeat, or chest pain  · Dizziness, lightheadedness  · Constant feeling of being cold  · Hives or a cut or rash that swells, turns red, feels hot or painful, or begins to ooze  · Burning when you urinate  · Fever of 100.4°F (38°C) or higher, or chills   Date Last Reviewed: 5/1/2016  © 7535-1806 [employer]. [address] [place] [address], [place] [address]. All rights reserved. This information is not intended as a substitute for professional medical care. Always follow your healthcare professional's instructions.        Oncology: Preventing Infections  Chemotherapy can make your body less able to fight off infection. This happens because treatment reduces the number of white blood cells. White blood cells fight infection in your body. To help prevent infections, follow the tips below.     Scrub your hands with soap and warm water for at least 15 seconds.   Know your mari  The mari is the time during your chemotherapy cycle when you have the fewest white blood cells. The length of your mari and when it occurs depend on the medicines you are taking. Each medicine has its own mari. Talk with your doctor or nurse about your mari period. Then take extra precautions to prevent infection at that time.  Protect yourself  · Keep your hands clean. To reduce your risk of infection, bathe every day and wash your hands often throughout the day. For best results, lather them with soap for at least 15 seconds. Wash your hands before eating, after spending time in public places, and after using the bathroom.  · Stay away from some foods. Limit your risk. Dont eat uncooked or undercooked meat or fish. You may also be told not to eat raw vegetables or thin-skinned fruits during your mari.  · Reduce your risk for illness. During this time your body is less able to fight off colds, measles, and other illnesses. Stay away from anyone who has a fever or an infection. Also stay away from large crowds during your mari.  · Wear gloves. Make it harder for infection to enter your body. Wear gloves when you work around germs and dirt. Have someone else clean a pets tank, cage, or litter box.  · Try not to accidentally cut yourself. Protect your feet from injury and germs by not walking barefoot.  How medicines can  help  · Prevent and treat infection. Antibiotics work in this way by attacking and killing the germs that cause infection.  · Trigger new cell growth. These medicines cause your body to make new white blood cells. Neupogen is an example of such a medicine.  Talk with your doctor about the best medicines for you. In some cases, your doctor may tell you to not take acetaminophen or NSAIDs for pain relief because these medicines can mask a fever if you have neutropenia. Talk with your doctor about medicines for pain control if you need it during your mari period.  When to seek medical advice  Contact your doctor right away if you have any of the following:  · Fever of 100.4ºF (38ºC) or higher, or as directed by your healthcare provider  · Burning when you urinate  · Severe coughing or sore throat  · Shortness of breath, sweating, or chills  · Pain, especially near an open wound or catheter site   Date Last Reviewed: 1/3/2016  © 9574-0081 [employer]. [address] [place] [address], [place] [address]. All rights reserved. This information is not intended as a substitute for professional medical care. Always follow your healthcare professional's instructions.        Discharge Instructions for Hypokalemia  You have been diagnosed with hypokalemia. This means you have a low level of potassium in your blood. Potassium helps your nerve and muscle cells work as they should. These cells include the cells in your heart. A low level of potassium in the blood can cause serious problems, such as abnormal heart rhythms and even heart attack.  Diet changes  Eat more potassium-rich foods:  · Bananas  · Oranges and orange juice  · Tomatoes, tomato sauce, and tomato juice  · Leafy green vegetables, such as spinach, kale, salad greens, collards, and chard  · Melons (all kinds)  · Pomegranates  · Peas  · Beans  · Potatoes  · Sweet potatoes  · Avocados, including guacamole  · Vegetable juices, such as V8  · Fruit juices  · All  nuts and seeds  · Fish, including tuna, halibut, salmon, cod, snapper, moses, swordfish, and perch  · Milk, including fat-free, low-fat, whole, chocolate, and buttermilk  · Soy milk  Other home care  · Take a potassium supplement as directed by your healthcare provider.  · After strenuous exercise or any activity that causes you to sweat a lot, grab a beverage high in potassium. This includes chocolate milk, coconut water, orange juice, or low-sodium vegetable juices.  · Be sure to eat foods or drink fluids that contain potassium if you are having diarrhea or vomiting.  · Have your potassium levels checked regularly.  · Take all medicines exactly as directed.  · Tell your healthcare provider about all prescription and ove-the-counter medicines you are taking. This includes herbal products.  · Avoid foods that are high in salt. Avoid canned and prepared foods that are high in salt.  Follow-up  · Make a follow-up appointment as directed by our staff.  · Keep all follow-up appointments. Your healthcare provider needs to monitor your condition closely.     When to call your healthcare provider  Call your provider right away or go to the emergency room if you have any of the following:  · Vomiting  · Fatigue  · Diarrhea  · Rapid, irregular heartbeat  · Shortness of breath  · Chest pain  · Muscle cramps, spasms, or twitching  · Weakness  · Paralysis   Date Last Reviewed: 6/20/2015  © 6404-0223 [employer]. [address] [place] [address], [place] [address]. All rights reserved. This information is not intended as a substitute for professional medical care. Always follow your healthcare professional's instructions.

## 2018-02-08 NOTE — PROGRESS NOTES
Date of Service: 12/22/2017  Ga Cross is 62 years of age  woman  for newly diagnosed breast cancer.start of AC chemotherapy.  The patient had self   examination and found a lump.  She has undergone bilateral mastectomies with Dr. Cardenas.  Pathology reveals three sentinel lymph nodes on the right side, one   of which showed micrometastases size of 0.8 mm without extranodal extension.    The patient shows invasive ductal carcinoma 1.4 cm with low-grade ductal   carcinoma in situ on the right side.  On the left breast, the patient shows no   evidence of lymph node metastases on sentinel lymph node evaluation, focal   low-grade LCIS and DCIS on the left side, but no invasive component.  The   patient's invasive ductal carcinoma total size is about 2.3 cm, status post   mastectomy.  She stages as a T2 N1 MX.  The patient has the invasive ductal   carcinoma showing ER positivity, VT positivity, and HER-2 negativity by FISH.    Has seen xrt in consult, DR. Graham started rx here for cycle #3 ,   FAMILY HISTORY:  Noncontributory.  There is heart disease and dyslipidemia,   strokes and asthma.    SOCIAL HISTORY:  Nonsmoker, no recreational drug or alcohol use.  Occasional   social alcohol only.    SURGICAL HISTORY:  Significant for cholecystectomy, tonsillectomy and bilateral   mastectomies now.    MEDICAL HISTORY:  Significant for dyslipidemia, hypertension, mitral valve   prolapse, seasonal allergies.    MEDICATIONS:  Include Norvasc, cyclobenzaprine, HydroDIURIL, ibuprofen.    ALLERGIES:  She is allergic to hydromorphone and penicillin.    PHYSICAL EXAMINATION:   VITAL SIGNS:  Reveals the patient with 185 pound weight.  BSA 1.94, height of 5   feet 4 inches, no pain.  Wt Readings from Last 3 Encounters:   02/08/18 85.1 kg (187 lb 9.8 oz)   01/25/18 84 kg (185 lb 1.2 oz)   01/25/18 84 kg (185 lb 1.2 oz)     Temp Readings from Last 3 Encounters:   02/08/18 98.6 °F (37 °C) (Oral)   01/25/18 98.3 °F  (36.8 °C)   01/25/18 98.3 °F (36.8 °C)     BP Readings from Last 3 Encounters:   02/08/18 (!) 147/72   01/25/18 107/70   01/25/18 130/77     Pulse Readings from Last 3 Encounters:   02/08/18 85   01/25/18 79   01/25/18 86   GENERAL:  Awake, alert, oriented.  BREASTS:  Double mastectomy is healing well.  LUNGS:  Clear to auscultation.  No JVD.  Trachea is central.  CVS:  S1, S2 normally heard.  No murmurs or gallops.  ABDOMEN:  Soft.  No rebound, guarding or rigidity.  EXTREMITIES:  Without edema.  NEUROLOGICAL:  The patient is appropriate.    LABS:  Labs from   Lab Results   Component Value Date    WBC 8.28 02/06/2018    HGB 12.3 02/06/2018    HCT 37.6 02/06/2018    MCV 89 02/06/2018     02/06/2018     CMP  Sodium   Date Value Ref Range Status   02/06/2018 142 136 - 145 mmol/L Final     Potassium   Date Value Ref Range Status   02/06/2018 3.3 (L) 3.5 - 5.1 mmol/L Final     Chloride   Date Value Ref Range Status   02/06/2018 99 95 - 110 mmol/L Final     CO2   Date Value Ref Range Status   02/06/2018 33 (H) 22 - 31 mmol/L Final     Glucose   Date Value Ref Range Status   02/06/2018 137 (H) 70 - 110 mg/dL Final     Comment:     The ADA recommends the following guidelines for fasting glucose:  Normal:       less than 100 mg/dL  Prediabetes:  100 mg/dL to 125 mg/dL  Diabetes:     126 mg/dL or higher       BUN, Bld   Date Value Ref Range Status   02/06/2018 15 7 - 18 mg/dL Final     Creatinine   Date Value Ref Range Status   02/06/2018 0.57 0.50 - 1.40 mg/dL Final     Calcium   Date Value Ref Range Status   02/06/2018 9.3 8.4 - 10.2 mg/dL Final     Total Protein   Date Value Ref Range Status   02/06/2018 6.7 6.0 - 8.4 g/dL Final     Albumin   Date Value Ref Range Status   02/06/2018 4.0 3.5 - 5.2 g/dL Final     Total Bilirubin   Date Value Ref Range Status   02/06/2018 0.5 0.2 - 1.3 mg/dL Final     Alkaline Phosphatase   Date Value Ref Range Status   02/06/2018 87 38 - 145 U/L Final     AST ([facility])   Date  Value Ref Range Status   01/18/2016 22 14 - 36 U/L Final     AST   Date Value Ref Range Status   02/06/2018 22 14 - 36 U/L Final     ALT   Date Value Ref Range Status   02/06/2018 23 10 - 44 U/L Final     Anion Gap   Date Value Ref Range Status   02/06/2018 10 8 - 16 mmol/L Final     eGFR if    Date Value Ref Range Status   02/06/2018 >60 >60 mL/min/1.73 m^2 Final     eGFR if non    Date Value Ref Range Status   02/06/2018 >60 >60 mL/min/1.73 m^2 Final     Comment:     Calculation used to obtain the estimated glomerular filtration  rate (eGFR) is the CKD-EPI equation.      PATHOLOGY:  As mentioned above.    IMPRESSION:  T2 N1 MX breast cancer with DCIS on left side and invasive   component on the right side.  ER/AZ positive, HER-2 negative.  PET done , neg for mets  PLAN:       porceed with dose dense AC cycle #3with premeds as ordered  neulasta support for neutropenia prevention from chemotharpy   satisfactory chemo class per pt   rtc 2 weeks with cbc, cmp for cycle #3after which she will receive 12 weeks of taxol then xrt+arimidex

## 2018-02-09 NOTE — PLAN OF CARE
Problem: Patient Care Overview  Goal: Plan of Care Review  Outcome: Ongoing (interventions implemented as appropriate)  Pt tolerated infusion well without complaints. AVS printed and reviewed. Pt verbalized understanding. D/c to home with steady gait. VSS.

## 2018-02-22 ENCOUNTER — OFFICE VISIT (OUTPATIENT)
Dept: HEMATOLOGY/ONCOLOGY | Facility: CLINIC | Age: 63
End: 2018-02-22
Payer: COMMERCIAL

## 2018-02-22 ENCOUNTER — INFUSION (OUTPATIENT)
Dept: INFUSION THERAPY | Facility: HOSPITAL | Age: 63
End: 2018-02-22
Attending: INTERNAL MEDICINE
Payer: COMMERCIAL

## 2018-02-22 VITALS
SYSTOLIC BLOOD PRESSURE: 149 MMHG | RESPIRATION RATE: 18 BRPM | OXYGEN SATURATION: 96 % | BODY MASS INDEX: 32.21 KG/M2 | TEMPERATURE: 99 F | HEIGHT: 64 IN | DIASTOLIC BLOOD PRESSURE: 71 MMHG | WEIGHT: 188.69 LBS | HEART RATE: 97 BPM

## 2018-02-22 VITALS
OXYGEN SATURATION: 96 % | SYSTOLIC BLOOD PRESSURE: 125 MMHG | RESPIRATION RATE: 17 BRPM | HEART RATE: 88 BPM | HEIGHT: 64 IN | WEIGHT: 188.69 LBS | DIASTOLIC BLOOD PRESSURE: 72 MMHG | BODY MASS INDEX: 32.21 KG/M2 | TEMPERATURE: 98 F

## 2018-02-22 DIAGNOSIS — Z17.0 MALIGNANT NEOPLASM OF OVERLAPPING SITES OF BOTH BREASTS IN FEMALE, ESTROGEN RECEPTOR POSITIVE: ICD-10-CM

## 2018-02-22 DIAGNOSIS — D64.81 ANTINEOPLASTIC CHEMOTHERAPY INDUCED ANEMIA: ICD-10-CM

## 2018-02-22 DIAGNOSIS — C50.011 MALIGNANT NEOPLASM OF NIPPLE OF RIGHT BREAST IN FEMALE, UNSPECIFIED ESTROGEN RECEPTOR STATUS: Primary | ICD-10-CM

## 2018-02-22 DIAGNOSIS — C50.811 MALIGNANT NEOPLASM OF OVERLAPPING SITES OF BOTH BREASTS IN FEMALE, ESTROGEN RECEPTOR POSITIVE: ICD-10-CM

## 2018-02-22 DIAGNOSIS — T45.1X5A CHEMOTHERAPY INDUCED NEUTROPENIA: Primary | ICD-10-CM

## 2018-02-22 DIAGNOSIS — C50.812 MALIGNANT NEOPLASM OF OVERLAPPING SITES OF BOTH BREASTS IN FEMALE, ESTROGEN RECEPTOR POSITIVE: ICD-10-CM

## 2018-02-22 DIAGNOSIS — D70.1 CHEMOTHERAPY INDUCED NEUTROPENIA: Primary | ICD-10-CM

## 2018-02-22 DIAGNOSIS — T45.1X5A ANTINEOPLASTIC CHEMOTHERAPY INDUCED ANEMIA: ICD-10-CM

## 2018-02-22 PROCEDURE — 96413 CHEMO IV INFUSION 1 HR: CPT | Mod: PN

## 2018-02-22 PROCEDURE — 99214 OFFICE O/P EST MOD 30 MIN: CPT | Mod: S$GLB,,, | Performed by: INTERNAL MEDICINE

## 2018-02-22 PROCEDURE — 3008F BODY MASS INDEX DOCD: CPT | Mod: S$GLB,,, | Performed by: INTERNAL MEDICINE

## 2018-02-22 PROCEDURE — 96367 TX/PROPH/DG ADDL SEQ IV INF: CPT | Mod: PN

## 2018-02-22 PROCEDURE — 96377 APPLICATON ON-BODY INJECTOR: CPT | Mod: PN

## 2018-02-22 PROCEDURE — 99999 PR PBB SHADOW E&M-EST. PATIENT-LVL III: CPT | Mod: PBBFAC,,, | Performed by: INTERNAL MEDICINE

## 2018-02-22 PROCEDURE — A4216 STERILE WATER/SALINE, 10 ML: HCPCS | Mod: PN | Performed by: INTERNAL MEDICINE

## 2018-02-22 PROCEDURE — 63600175 PHARM REV CODE 636 W HCPCS: Mod: TB,PN | Performed by: INTERNAL MEDICINE

## 2018-02-22 PROCEDURE — 25000003 PHARM REV CODE 250: Mod: PN | Performed by: INTERNAL MEDICINE

## 2018-02-22 PROCEDURE — 96411 CHEMO IV PUSH ADDL DRUG: CPT | Mod: PN

## 2018-02-22 RX ORDER — SODIUM CHLORIDE 0.9 % (FLUSH) 0.9 %
10 SYRINGE (ML) INJECTION
Status: DISCONTINUED | OUTPATIENT
Start: 2018-02-22 | End: 2018-02-22 | Stop reason: HOSPADM

## 2018-02-22 RX ORDER — SODIUM CHLORIDE 0.9 % (FLUSH) 0.9 %
10 SYRINGE (ML) INJECTION
Status: CANCELLED | OUTPATIENT
Start: 2018-02-22

## 2018-02-22 RX ORDER — DOXORUBICIN HYDROCHLORIDE 2 MG/ML
60 INJECTION, SOLUTION INTRAVENOUS
Status: COMPLETED | OUTPATIENT
Start: 2018-02-22 | End: 2018-02-22

## 2018-02-22 RX ORDER — HEPARIN 100 UNIT/ML
500 SYRINGE INTRAVENOUS
Status: CANCELLED | OUTPATIENT
Start: 2018-02-22

## 2018-02-22 RX ORDER — DOXORUBICIN HYDROCHLORIDE 2 MG/ML
60 INJECTION, SOLUTION INTRAVENOUS
Status: CANCELLED | OUTPATIENT
Start: 2018-02-22

## 2018-02-22 RX ADMIN — SODIUM CHLORIDE: 900 INJECTION, SOLUTION INTRAVENOUS at 01:02

## 2018-02-22 RX ADMIN — PALONOSETRON HYDROCHLORIDE: 0.25 INJECTION INTRAVENOUS at 01:02

## 2018-02-22 RX ADMIN — SODIUM CHLORIDE 150 MG: 9 INJECTION, SOLUTION INTRAVENOUS at 02:02

## 2018-02-22 RX ADMIN — CYCLOPHOSPHAMIDE 1165 MG: 1 INJECTION, POWDER, FOR SOLUTION INTRAVENOUS; ORAL at 02:02

## 2018-02-22 RX ADMIN — DOXORUBICIN HYDROCHLORIDE 116 MG: 2 INJECTION, SOLUTION INTRAVENOUS at 02:02

## 2018-02-22 RX ADMIN — SODIUM CHLORIDE, PRESERVATIVE FREE 10 ML: 5 INJECTION INTRAVENOUS at 01:02

## 2018-02-22 RX ADMIN — PEGFILGRASTIM 6 MG: KIT SUBCUTANEOUS at 03:02

## 2018-02-22 NOTE — PLAN OF CARE
"Problem: Patient Care Overview  Goal: Plan of Care Review  Outcome: Ongoing (interventions implemented as appropriate)  Pt tolerated infusion, IVP, and OBI well. During Adriamycin push, at times pt stated "I can feel it going through." Blood return checked throughout IVP with positive blood return noted, flush flowed to gravity throughout Adriamycin push. No other complaints per pt. AVS printed and reviewed. Pt and son verbalized understanding. D/C to home with steady gait and VSS.      "

## 2018-02-22 NOTE — PATIENT INSTRUCTIONS
"  Preventing Falls: Are You At Risk of Falling?     Ask for help to reduce risk of falling in your home.     As you get older, you're not as steady on your feet as you once were. And you may have health problems you didn't have when you were younger. So, it's not surprising that older people are more likely to trip and fall. Falling can be very serious. It can change your overall health and quality of life. That's why it's important to be aware of your own risk of falling.  The dangers of falling  Falls are one of the main causes of injury in people over age 65. An older person who falls may take longer to get better than a younger person. And, after a fall, an older person is more likely to have problems that don't go away. So, preventing falls can help you avoid serious health problems.  Are you at risk of falling?  Answer these questions to rate your level of risk.  · Are you a woman?  · Have you fallen or stumbled in the last year?  · Are you over age 65?  · Are you ever dizzy or lightheaded with standing?  · Do you have a hard time getting in and out of the bathtub or on and off the toilet?  · Do you lean on objects to help you get around? Or do you use a cane or walker?  · Do you have vision or hearing problems? For example, do you need new glasses or hearing aids?  · Do you have 2 or more long-lasting (chronic) medical conditions?  · Do you take 3 or more medicines?  · Have you felt depressed recently?  · Have you had more trouble with your memory in recent months?  · Are there hazards in your home that might cause you to fall, such as loose rugs or poor lighting?  · Do you have a pet that jumps on you or might trip you?  · Have you stopped getting regular exercise?  · Do you have diabetes?   · Do you have a neurologic disease, such as Parkinson or Alzheimer disease?   · Do you drink alcohol?  · Do you wear athletic shoes or slippers, or go barefoot at home?  You can help prevent falls  If you answered "yes" " to any of the above questions, you should take steps to reduce your risk of a fall. Monitoring health conditions and keeping walkways in your home free of clutter are just 2 ways. Changing is sometimes easier said than done. But keep in mind that even small changes can make you less likely to fall.  The fear of falling  It's normal to be scared of falling, especially if you've fallen before. But being afraid can actually make you more likely to fall. This is because:  · Fear might cause you to become less active. Being less active can lead to a loss of strength and balance.  · Fear can lead to isolation from others, depression, or the use of more medicines or alcohol. And all these things make falling even more likely.  To break the cycle, learn more about ways to avoid falling. As you take control, you may find yourself feeling less afraid.   Date Last Reviewed: 6/12/2015  © 5629-3664 [employer]. [address] [place] [address]. All rights reserved. This information is not intended as a substitute for professional medical care. Always follow your healthcare professional's instructions.        Oncology: Preventing Infections  Chemotherapy can make your body less able to fight off infection. This happens because treatment reduces the number of white blood cells. White blood cells fight infection in your body. To help prevent infections, follow the tips below.     Scrub your hands with soap and warm water for at least 15 seconds.   Know your mair  The mari is the time during your chemotherapy cycle when you have the fewest white blood cells. The length of your mari and when it occurs depend on the medicines you are taking. Each medicine has its own mari. Talk with your doctor or nurse about your mari period. Then take extra precautions to prevent infection at that time.  Protect yourself  · Keep your hands clean. To reduce your risk of infection, bathe every day and wash your hands often  throughout the day. For best results, lather them with soap for at least 15 seconds. Wash your hands before eating, after spending time in public places, and after using the bathroom.  · Stay away from some foods. Limit your risk. Dont eat uncooked or undercooked meat or fish. You may also be told not to eat raw vegetables or thin-skinned fruits during your mari.  · Reduce your risk for illness. During this time your body is less able to fight off colds, measles, and other illnesses. Stay away from anyone who has a fever or an infection. Also stay away from large crowds during your mari.  · Wear gloves. Make it harder for infection to enter your body. Wear gloves when you work around germs and dirt. Have someone else clean a pets tank, cage, or litter box.  · Try not to accidentally cut yourself. Protect your feet from injury and germs by not walking barefoot.  How medicines can help  · Prevent and treat infection. Antibiotics work in this way by attacking and killing the germs that cause infection.  · Trigger new cell growth. These medicines cause your body to make new white blood cells. Neupogen is an example of such a medicine.  Talk with your doctor about the best medicines for you. In some cases, your doctor may tell you to not take acetaminophen or NSAIDs for pain relief because these medicines can mask a fever if you have neutropenia. Talk with your doctor about medicines for pain control if you need it during your mari period.  When to seek medical advice  Contact your doctor right away if you have any of the following:  · Fever of 100.4ºF (38ºC) or higher, or as directed by your healthcare provider  · Burning when you urinate  · Severe coughing or sore throat  · Shortness of breath, sweating, or chills  · Pain, especially near an open wound or catheter site   Date Last Reviewed: 1/3/2016  © 0246-2865 [employer]. [address] [place] [address], [place] [address]. All rights reserved. This  information is not intended as a substitute for professional medical care. Always follow your healthcare professional's instructions.        Discharge Instructions for Chemotherapy  Your healthcare provider prescribed a type of medicine therapy for you called chemotherapy. Healthcare providers prescribe chemotherapy for many different types of illnesses, including cancer. There are many types of chemotherapy. This sheet provides general guidelines on how you can take care of yourself after your chemotherapy.  Mouth care  Dont be discouraged if you get mouth sores, even if you are following all your healthcare providers instructions. Many people get mouth sores as a side effect of chemotherapy. Heres what you can do to prevent mouth sores:  · Keep your mouth clean. Brush your teeth with a soft-bristle toothbrush after every meal.  · Ask if you should use a toothpaste with fluoride, or a mixture of 1 teaspoon of salt in 8-ounces of water to brush your teeth.   · Use an oral swab or special soft toothbrush if your gums bleed during regular brushing.  · Don't use dental floss if it causes your gums to bleed.  · Use any mouthwashes given to you as directed.  · If you cant tolerate regular methods, use salt and baking soda to clean your mouth. Mix 1 teaspoon of salt and 1 teaspoon of baking soda into an 8-ounce glass of warm water. Swish and spit.  · If you wear dentures, you may be told to wear them only when you eat, ask your healthcare provider. Clean dentures twice a day and soak in antimicrobial solution when you aren't wearing them. Rinse your mouth after each meal.   · Watch your mouth and tongue for white patches. This may be a sign of a type of yeast infection (thrush), a common side effect of chemotherapy. Be sure to tell your healthcare provider about these patches. Medicine can be prescribed to treat it.  Other home care  Here's what else you can do:  · Try to exercise. Exercise keeps you strong and keeps  your heart and lungs active. Walking and yoga are good types of exercise.   · Keep clean. During chemotherapy, your body cant fight infection very well. Take short baths or showers.  ¨ Wash your hands before you eat and after going to the bathroom.  ¨ Use moisturizing soap. Chemotherapy can make your skin dry.  ¨ Apply moisturizing lotion several times a day to help relieve dry skin.  ¨ Dont take very hot or very cold showers or baths.  · Dont be surprised if your chemotherapy causes slight burns to your skin--usually on the hands and feet. Some medicines used in high doses cause this to happen. Ask for a special cream to help relieve the burn and protect your skin.  · Avoid people who are sick with illnesses and diseases you could catch, such as colds, flu, measles, or chicken pox as well as people who have recently had vaccinations for these illnesses.   · Let your healthcare provider know if your throat is sore. You may have an infection that needs treatment.  · Remember, many patients feel sick and lose their appetites during treatment. Eat small meals several times a day to keep your strength up:  ¨ Choose bland foods with little taste or smell if you are reacting strongly to food.  ¨ Be sure to cook all food thoroughly. This kills bacteria and helps you avoid infection.  ¨ Eat foods that are soft. Soft foods are less likely to cause stomach irritation.  ¨ Try to eat a variety of foods for a well-balanced diet. Drink plenty of fluids and eat foods with fiber to avoid constipation.      When to call your healthcare provider  Call your healthcare provider right away if you have any of the following:  · Unexplained bleeding  · Trouble concentrating  · Ongoing fatigue  · Shortness of breath, wheezing, trouble breathing, or bad cough  · Rapid, irregular heartbeat, or chest pain  · Dizziness, lightheadedness  · Constant feeling of being cold  · Hives or a cut or rash that swells, turns red, feels hot or painful,  or begins to ooze  · Burning when you urinate  · Fever of 100.4°F (38°C) or higher, or chills   Date Last Reviewed: 5/1/2016  © 6250-5444 [employer]. [address] [place] [address], [place] [address]. All rights reserved. This information is not intended as a substitute for professional medical care. Always follow your healthcare professional's instructions.        Discharge Instructions for Hypokalemia  You have been diagnosed with hypokalemia. This means you have a low level of potassium in your blood. Potassium helps your nerve and muscle cells work as they should. These cells include the cells in your heart. A low level of potassium in the blood can cause serious problems, such as abnormal heart rhythms and even heart attack.  Diet changes  Eat more potassium-rich foods:  · Bananas  · Oranges and orange juice  · Tomatoes, tomato sauce, and tomato juice  · Leafy green vegetables, such as spinach, kale, salad greens, collards, and chard  · Melons (all kinds)  · Pomegranates  · Peas  · Beans  · Potatoes  · Sweet potatoes  · Avocados, including guacamole  · Vegetable juices, such as V8  · Fruit juices  · All nuts and seeds  · Fish, including tuna, halibut, salmon, cod, snapper, moses, swordfish, and perch  · Milk, including fat-free, low-fat, whole, chocolate, and buttermilk  · Soy milk  Other home care  · Take a potassium supplement as directed by your healthcare provider.  · After strenuous exercise or any activity that causes you to sweat a lot, grab a beverage high in potassium. This includes chocolate milk, coconut water, orange juice, or low-sodium vegetable juices.  · Be sure to eat foods or drink fluids that contain potassium if you are having diarrhea or vomiting.  · Have your potassium levels checked regularly.  · Take all medicines exactly as directed.  · Tell your healthcare provider about all prescription and ove-the-counter medicines you are taking. This includes herbal products.  · Avoid  foods that are high in salt. Avoid canned and prepared foods that are high in salt.  Follow-up  · Make a follow-up appointment as directed by our staff.  · Keep all follow-up appointments. Your healthcare provider needs to monitor your condition closely.     When to call your healthcare provider  Call your provider right away or go to the emergency room if you have any of the following:  · Vomiting  · Fatigue  · Diarrhea  · Rapid, irregular heartbeat  · Shortness of breath  · Chest pain  · Muscle cramps, spasms, or twitching  · Weakness  · Paralysis   Date Last Reviewed: 6/20/2015  © 8842-4464 [employer]. [address] [place] [address], [place] [address]. All rights reserved. This information is not intended as a substitute for professional medical care. Always follow your healthcare professional's instructions.

## 2018-02-22 NOTE — PROGRESS NOTES
Date of Service: 12/22/2017  Ga Cross is 62 years of age  woman  for newly diagnosed breast cancer.start of AC chemotherapy.  The patient had self   examination and found a lump.  She has undergone bilateral mastectomies with Dr. Cardenas.  Pathology reveals three sentinel lymph nodes on the right side, one   of which showed micrometastases size of 0.8 mm without extranodal extension.    The patient shows invasive ductal carcinoma 1.4 cm with low-grade ductal   carcinoma in situ on the right side.  On the left breast, the patient shows no   evidence of lymph node metastases on sentinel lymph node evaluation, focal   low-grade LCIS and DCIS on the left side, but no invasive component.  The   patient's invasive ductal carcinoma total size is about 2.3 cm, status post   mastectomy.  She stages as a T2 N1 MX.  The patient has the invasive ductal   carcinoma showing ER positivity, SD positivity, and HER-2 negativity by FISH.    Has seen xrt in consult, DR. Graham started rx here for cycle #3 ,   FAMILY HISTORY:  Noncontributory.  There is heart disease and dyslipidemia,   strokes and asthma.    SOCIAL HISTORY:  Nonsmoker, no recreational drug or alcohol use.  Occasional   social alcohol only.    SURGICAL HISTORY:  Significant for cholecystectomy, tonsillectomy and bilateral   mastectomies now.    MEDICAL HISTORY:  Significant for dyslipidemia, hypertension, mitral valve   prolapse, seasonal allergies.    MEDICATIONS:  Include Norvasc, cyclobenzaprine, HydroDIURIL, ibuprofen.    ALLERGIES:  She is allergic to hydromorphone and penicillin.    PHYSICAL EXAMINATION:   VITAL SIGNS:  Reveals the patient with 185 pound weight.  BSA 1.94, height of 5   feet 4 inches, no pain.  Wt Readings from Last 3 Encounters:   02/22/18 85.6 kg (188 lb 11.4 oz)   02/22/18 85.6 kg (188 lb 11.4 oz)   02/08/18 85.1 kg (187 lb 9.8 oz)     Temp Readings from Last 3 Encounters:   02/22/18 98.5 °F (36.9 °C)   02/22/18 98.5 °F  (36.9 °C) (Oral)   02/08/18 97.8 °F (36.6 °C)     BP Readings from Last 3 Encounters:   02/22/18 (!) 149/71   02/22/18 (!) 149/71   02/08/18 123/74     Pulse Readings from Last 3 Encounters:   02/22/18 97   02/22/18 97   02/08/18 81   GENERAL:  Awake, alert, oriented.  BREASTS:  Double mastectomy is healing well.  LUNGS:  Clear to auscultation.  No JVD.  Trachea is central.  CVS:  S1, S2 normally heard.  No murmurs or gallops.  ABDOMEN:  Soft.  No rebound, guarding or rigidity.  EXTREMITIES:  Without edema.  NEUROLOGICAL:  The patient is appropriate.    LABS:  Labs from   Lab Results   Component Value Date    WBC 9.43 02/20/2018    HGB 12.1 02/20/2018    HCT 36.7 (L) 02/20/2018    MCV 90 02/20/2018     02/20/2018     CMP  Sodium   Date Value Ref Range Status   02/20/2018 143 136 - 145 mmol/L Final     Potassium   Date Value Ref Range Status   02/20/2018 3.3 (L) 3.5 - 5.1 mmol/L Final     Chloride   Date Value Ref Range Status   02/20/2018 100 95 - 110 mmol/L Final     CO2   Date Value Ref Range Status   02/20/2018 30 22 - 31 mmol/L Final     Glucose   Date Value Ref Range Status   02/20/2018 97 70 - 110 mg/dL Final     Comment:     The ADA recommends the following guidelines for fasting glucose:  Normal:       less than 100 mg/dL  Prediabetes:  100 mg/dL to 125 mg/dL  Diabetes:     126 mg/dL or higher       BUN, Bld   Date Value Ref Range Status   02/20/2018 14 7 - 18 mg/dL Final     Creatinine   Date Value Ref Range Status   02/20/2018 0.59 0.50 - 1.40 mg/dL Final     Calcium   Date Value Ref Range Status   02/20/2018 9.7 8.4 - 10.2 mg/dL Final     Total Protein   Date Value Ref Range Status   02/20/2018 7.1 6.0 - 8.4 g/dL Final     Albumin   Date Value Ref Range Status   02/20/2018 4.3 3.5 - 5.2 g/dL Final     Total Bilirubin   Date Value Ref Range Status   02/20/2018 0.5 0.2 - 1.3 mg/dL Final     Alkaline Phosphatase   Date Value Ref Range Status   02/20/2018 90 38 - 145 U/L Final     AST ([facility])    Date Value Ref Range Status   01/18/2016 22 14 - 36 U/L Final     AST   Date Value Ref Range Status   02/20/2018 27 14 - 36 U/L Final     ALT   Date Value Ref Range Status   02/20/2018 28 10 - 44 U/L Final     Anion Gap   Date Value Ref Range Status   02/20/2018 13 8 - 16 mmol/L Final     eGFR if    Date Value Ref Range Status   02/20/2018 >60 >60 mL/min/1.73 m^2 Final     eGFR if non    Date Value Ref Range Status   02/20/2018 >60 >60 mL/min/1.73 m^2 Final     Comment:     Calculation used to obtain the estimated glomerular filtration  rate (eGFR) is the CKD-EPI equation.      PATHOLOGY:  As mentioned above.    IMPRESSION:  T2 N1 MX breast cancer with DCIS on left side and invasive   component on the right side.  ER/AL positive, HER-2 negative.  PET done , neg for mets  PLAN:       porceed with dose dense AC cycle #4with premeds as ordered  neulasta support for neutropenia prevention from chemotharpy   satisfactory chemo class per pt   rtc 2 weeks with cbc, cmp for cycle #4after which she will receive 12 weeks of taxol then xrt+arimidex

## 2018-03-07 ENCOUNTER — TELEPHONE (OUTPATIENT)
Dept: PHARMACY | Facility: AMBULARY SURGERY CENTER | Age: 63
End: 2018-03-07

## 2018-03-08 ENCOUNTER — INFUSION (OUTPATIENT)
Dept: INFUSION THERAPY | Facility: HOSPITAL | Age: 63
End: 2018-03-08
Attending: INTERNAL MEDICINE
Payer: COMMERCIAL

## 2018-03-08 ENCOUNTER — OFFICE VISIT (OUTPATIENT)
Dept: HEMATOLOGY/ONCOLOGY | Facility: CLINIC | Age: 63
End: 2018-03-08
Payer: COMMERCIAL

## 2018-03-08 VITALS
DIASTOLIC BLOOD PRESSURE: 81 MMHG | WEIGHT: 190.25 LBS | HEART RATE: 91 BPM | RESPIRATION RATE: 20 BRPM | SYSTOLIC BLOOD PRESSURE: 127 MMHG | TEMPERATURE: 98 F | HEIGHT: 64 IN | BODY MASS INDEX: 32.48 KG/M2

## 2018-03-08 VITALS
DIASTOLIC BLOOD PRESSURE: 68 MMHG | TEMPERATURE: 98 F | HEART RATE: 97 BPM | WEIGHT: 190.25 LBS | RESPIRATION RATE: 20 BRPM | HEIGHT: 64 IN | BODY MASS INDEX: 32.48 KG/M2 | SYSTOLIC BLOOD PRESSURE: 128 MMHG

## 2018-03-08 DIAGNOSIS — Z17.0 MALIGNANT NEOPLASM OF OVERLAPPING SITES OF BOTH BREASTS IN FEMALE, ESTROGEN RECEPTOR POSITIVE: ICD-10-CM

## 2018-03-08 DIAGNOSIS — T45.1X5A CHEMOTHERAPY INDUCED NEUTROPENIA: Primary | ICD-10-CM

## 2018-03-08 DIAGNOSIS — T45.1X5A ANTINEOPLASTIC CHEMOTHERAPY INDUCED ANEMIA: ICD-10-CM

## 2018-03-08 DIAGNOSIS — C50.812 MALIGNANT NEOPLASM OF OVERLAPPING SITES OF BOTH BREASTS IN FEMALE, ESTROGEN RECEPTOR POSITIVE: ICD-10-CM

## 2018-03-08 DIAGNOSIS — C50.811 MALIGNANT NEOPLASM OF OVERLAPPING SITES OF BOTH BREASTS IN FEMALE, ESTROGEN RECEPTOR POSITIVE: Primary | ICD-10-CM

## 2018-03-08 DIAGNOSIS — D64.81 ANTINEOPLASTIC CHEMOTHERAPY INDUCED ANEMIA: ICD-10-CM

## 2018-03-08 DIAGNOSIS — C50.811 MALIGNANT NEOPLASM OF OVERLAPPING SITES OF BOTH BREASTS IN FEMALE, ESTROGEN RECEPTOR POSITIVE: ICD-10-CM

## 2018-03-08 DIAGNOSIS — D70.1 CHEMOTHERAPY INDUCED NEUTROPENIA: Primary | ICD-10-CM

## 2018-03-08 DIAGNOSIS — C50.812 MALIGNANT NEOPLASM OF OVERLAPPING SITES OF BOTH BREASTS IN FEMALE, ESTROGEN RECEPTOR POSITIVE: Primary | ICD-10-CM

## 2018-03-08 DIAGNOSIS — Z17.0 MALIGNANT NEOPLASM OF OVERLAPPING SITES OF BOTH BREASTS IN FEMALE, ESTROGEN RECEPTOR POSITIVE: Primary | ICD-10-CM

## 2018-03-08 PROCEDURE — A4216 STERILE WATER/SALINE, 10 ML: HCPCS | Mod: PN | Performed by: NURSE PRACTITIONER

## 2018-03-08 PROCEDURE — 96375 TX/PRO/DX INJ NEW DRUG ADDON: CPT | Mod: PN

## 2018-03-08 PROCEDURE — 96413 CHEMO IV INFUSION 1 HR: CPT | Mod: PN

## 2018-03-08 PROCEDURE — 99999 PR PBB SHADOW E&M-EST. PATIENT-LVL IV: CPT | Mod: PBBFAC,,, | Performed by: NURSE PRACTITIONER

## 2018-03-08 PROCEDURE — 25000003 PHARM REV CODE 250: Mod: PN | Performed by: NURSE PRACTITIONER

## 2018-03-08 PROCEDURE — 99214 OFFICE O/P EST MOD 30 MIN: CPT | Mod: S$GLB,,, | Performed by: NURSE PRACTITIONER

## 2018-03-08 PROCEDURE — 96367 TX/PROPH/DG ADDL SEQ IV INF: CPT | Mod: PN

## 2018-03-08 PROCEDURE — 63600175 PHARM REV CODE 636 W HCPCS: Mod: PN | Performed by: NURSE PRACTITIONER

## 2018-03-08 PROCEDURE — S0028 INJECTION, FAMOTIDINE, 20 MG: HCPCS | Mod: PN | Performed by: NURSE PRACTITIONER

## 2018-03-08 RX ORDER — SODIUM CHLORIDE 0.9 % (FLUSH) 0.9 %
10 SYRINGE (ML) INJECTION
Status: CANCELLED | OUTPATIENT
Start: 2018-03-08

## 2018-03-08 RX ORDER — FAMOTIDINE 10 MG/ML
20 INJECTION INTRAVENOUS
Status: CANCELLED | OUTPATIENT
Start: 2018-03-08

## 2018-03-08 RX ORDER — DIPHENHYDRAMINE HYDROCHLORIDE 50 MG/ML
25 INJECTION INTRAMUSCULAR; INTRAVENOUS
Status: COMPLETED | OUTPATIENT
Start: 2018-03-08 | End: 2018-03-08

## 2018-03-08 RX ORDER — FAMOTIDINE 10 MG/ML
20 INJECTION INTRAVENOUS
Status: COMPLETED | OUTPATIENT
Start: 2018-03-08 | End: 2018-03-08

## 2018-03-08 RX ORDER — HEPARIN 100 UNIT/ML
500 SYRINGE INTRAVENOUS
Status: CANCELLED | OUTPATIENT
Start: 2018-03-08

## 2018-03-08 RX ORDER — SODIUM CHLORIDE 0.9 % (FLUSH) 0.9 %
10 SYRINGE (ML) INJECTION
Status: DISCONTINUED | OUTPATIENT
Start: 2018-03-08 | End: 2018-03-08 | Stop reason: HOSPADM

## 2018-03-08 RX ORDER — DIPHENHYDRAMINE HYDROCHLORIDE 50 MG/ML
50 INJECTION INTRAMUSCULAR; INTRAVENOUS ONCE AS NEEDED
Status: CANCELLED | OUTPATIENT
Start: 2018-03-08 | End: 2018-03-08

## 2018-03-08 RX ORDER — EPINEPHRINE 0.3 MG/.3ML
0.3 INJECTION SUBCUTANEOUS ONCE AS NEEDED
Status: CANCELLED | OUTPATIENT
Start: 2018-03-08 | End: 2018-03-08

## 2018-03-08 RX ORDER — DIPHENHYDRAMINE HYDROCHLORIDE 50 MG/ML
25 INJECTION INTRAMUSCULAR; INTRAVENOUS
Status: CANCELLED
Start: 2018-03-08 | End: 2018-03-08

## 2018-03-08 RX ADMIN — PACLITAXEL 156 MG: 6 INJECTION, SOLUTION INTRAVENOUS at 02:03

## 2018-03-08 RX ADMIN — SODIUM CHLORIDE: 9 INJECTION, SOLUTION INTRAVENOUS at 02:03

## 2018-03-08 RX ADMIN — FAMOTIDINE 20 MG: 10 INJECTION INTRAVENOUS at 02:03

## 2018-03-08 RX ADMIN — SODIUM CHLORIDE, PRESERVATIVE FREE 10 ML: 5 INJECTION INTRAVENOUS at 02:03

## 2018-03-08 RX ADMIN — DIPHENHYDRAMINE HYDROCHLORIDE 25 MG: 50 INJECTION, SOLUTION INTRAMUSCULAR; INTRAVENOUS at 02:03

## 2018-03-08 RX ADMIN — DEXAMETHASONE SODIUM PHOSPHATE 10 MG: 4 INJECTION, SOLUTION INTRA-ARTICULAR; INTRALESIONAL; INTRAMUSCULAR; INTRAVENOUS; SOFT TISSUE at 02:03

## 2018-03-08 NOTE — PLAN OF CARE
Problem: Patient Care Overview  Goal: Plan of Care Review  Outcome: Ongoing (interventions implemented as appropriate)  Tolerated Taxol well, VSS, AVS given to pt, ambulated from clinic with friend, no distress noted

## 2018-03-08 NOTE — PROGRESS NOTES
HISTORY OF PRESENT ILLNESS:  This is a 63 y/o  female known to   Dr. Mcintosh for newly diagnosed breast cancer involving both breasts.  The patient   was found to have invasive ductal carcinoma 1.4 cm with low-grade ductal carcinoma   in situ on the right side. Pathology reveals three sentinel lymph nodes on the right side, one   of which showed micrometastases size of 0.8 mm without extranodal extension.    The left breast shows no evidence of lymph node metastases on sentinel lymph node   evaluation, focal low-grade LCIS and DCIS, but no invasive component.  The   patient's invasive ductal carcinoma total size is about 2.3 cm.  She stages as   a T2 N1 MX.  Invasive ductal carcinoma showing ER positivity, CT positivity   and HER-2 negativity by FISH.  She has undergone bilateral mastectomies with   Dr. Cardenas.  She has completed 4 cycles of AC and presents to the clinic today   for evaluation prior to Week #1 of planned 12 infusions of Taxol.  She feels a little   fatigued, but states she is feeling well.  No other new complaints or pertinent findings   on a 14 point review of systems.    PHYSICAL EXAMINATION:   GENERAL:  WDWNWF in NAD, Alert & oriented x 3.  VITAL SIGNS:  Weight:  Decrease of 1 1/2 pounds in 2 weeks; BSA 1.97  Wt Readings from Last 3 Encounters:   03/08/18 86.3 kg (190 lb 4.1 oz)   03/08/18 86.3 kg (190 lb 4.1 oz)   02/22/18 85.6 kg (188 lb 11.4 oz)     Temp Readings from Last 3 Encounters:   03/08/18 98.2 °F (36.8 °C)   03/08/18 98.2 °F (36.8 °C)   02/22/18 98.2 °F (36.8 °C)     BP Readings from Last 3 Encounters:   03/08/18 128/68   03/08/18 128/68   02/22/18 125/72     Pulse Readings from Last 3 Encounters:   03/08/18 97   03/08/18 97   02/22/18 88     LUNGS:  Clear to auscultation.  No JVD.  Trachea is central.  NL respiratory effort.  CVS:  S1, S2 normally heard.  No murmurs or gallops.  ABDOMEN:  Soft.  NT/ND, +BS X 4.  EXTREMITIES:  No CCE, distal pulses present.  NEUROLOGICAL:   The patient is appropriate.    LABS:  Labs from   Lab Results   Component Value Date    WBC 7.91 03/06/2018    HGB 10.3 (L) 03/06/2018    HCT 31.4 (L) 03/06/2018    MCV 92 03/06/2018     (L) 03/06/2018     Differential remarkable for 10% lymph  CMP  Sodium   Date Value Ref Range Status   03/06/2018 140 136 - 145 mmol/L Final     Potassium   Date Value Ref Range Status   03/06/2018 3.3 (L) 3.5 - 5.1 mmol/L Final     Chloride   Date Value Ref Range Status   03/06/2018 95 95 - 110 mmol/L Final     CO2   Date Value Ref Range Status   03/06/2018 34 (H) 22 - 31 mmol/L Final     Glucose   Date Value Ref Range Status   03/06/2018 104 70 - 110 mg/dL Final     Comment:     The ADA recommends the following guidelines for fasting glucose:  Normal:       less than 100 mg/dL  Prediabetes:  100 mg/dL to 125 mg/dL  Diabetes:     126 mg/dL or higher       BUN, Bld   Date Value Ref Range Status   03/06/2018 10 7 - 18 mg/dL Final     Creatinine   Date Value Ref Range Status   03/06/2018 0.72 0.50 - 1.40 mg/dL Final     Calcium   Date Value Ref Range Status   03/06/2018 9.4 8.4 - 10.2 mg/dL Final     Total Protein   Date Value Ref Range Status   03/06/2018 6.7 6.0 - 8.4 g/dL Final     Albumin   Date Value Ref Range Status   03/06/2018 4.0 3.5 - 5.2 g/dL Final     Total Bilirubin   Date Value Ref Range Status   03/06/2018 0.4 0.2 - 1.3 mg/dL Final     Alkaline Phosphatase   Date Value Ref Range Status   03/06/2018 87 38 - 145 U/L Final     AST ([facility])   Date Value Ref Range Status   01/18/2016 22 14 - 36 U/L Final     AST   Date Value Ref Range Status   03/06/2018 18 14 - 36 U/L Final     ALT   Date Value Ref Range Status   03/06/2018 28 10 - 44 U/L Final     Anion Gap   Date Value Ref Range Status   03/06/2018 11 8 - 16 mmol/L Final     eGFR if    Date Value Ref Range Status   03/06/2018 >60 >60 mL/min/1.73 m^2 Final     eGFR if non    Date Value Ref Range Status   03/06/2018 >60 >60  mL/min/1.73 m^2 Final     Comment:     Calculation used to obtain the estimated glomerular filtration  rate (eGFR) is the CKD-EPI equation.        IMPRESSION:    1.  T2 N1 MX breast cancer with DCIS on left side and invasive component on the right side.  ER/DE positive, HER-2 negative.  2.  Hypokalemia - increase potassium to 20 meq bid  3.  Chemotherapy associated anemia & thrombocytopenia.    PLAN:    1.  Proceed with Week #1 of Taxol dosed at 80 mg/m2 (156 mg) with premedications of Benadryl, Dexamethasone and Pepcid.  2.  Return to clinic in 1 week with interval CBC, CMP, MG for evaluation prior to Week #2 of Taxol.  To Note:  Following 12 weeks of Taxol, patient will embark upon XRT + Arimidex.     Assessment/plan reviewed and approved by Dr. Mcintosh.

## 2018-03-15 ENCOUNTER — OFFICE VISIT (OUTPATIENT)
Dept: HEMATOLOGY/ONCOLOGY | Facility: CLINIC | Age: 63
End: 2018-03-15
Payer: COMMERCIAL

## 2018-03-15 ENCOUNTER — INFUSION (OUTPATIENT)
Dept: INFUSION THERAPY | Facility: HOSPITAL | Age: 63
End: 2018-03-15
Attending: INTERNAL MEDICINE
Payer: COMMERCIAL

## 2018-03-15 ENCOUNTER — TELEPHONE (OUTPATIENT)
Dept: HEMATOLOGY/ONCOLOGY | Facility: CLINIC | Age: 63
End: 2018-03-15

## 2018-03-15 VITALS
HEART RATE: 89 BPM | RESPIRATION RATE: 16 BRPM | TEMPERATURE: 98 F | DIASTOLIC BLOOD PRESSURE: 87 MMHG | HEIGHT: 64 IN | WEIGHT: 191.56 LBS | BODY MASS INDEX: 32.7 KG/M2 | SYSTOLIC BLOOD PRESSURE: 139 MMHG

## 2018-03-15 VITALS
RESPIRATION RATE: 16 BRPM | DIASTOLIC BLOOD PRESSURE: 77 MMHG | HEIGHT: 64 IN | BODY MASS INDEX: 32.37 KG/M2 | WEIGHT: 189.63 LBS | TEMPERATURE: 98 F | HEART RATE: 89 BPM | SYSTOLIC BLOOD PRESSURE: 150 MMHG

## 2018-03-15 DIAGNOSIS — Z17.0 MALIGNANT NEOPLASM OF OVERLAPPING SITES OF BOTH BREASTS IN FEMALE, ESTROGEN RECEPTOR POSITIVE: ICD-10-CM

## 2018-03-15 DIAGNOSIS — E78.00 PURE HYPERCHOLESTEROLEMIA: ICD-10-CM

## 2018-03-15 DIAGNOSIS — D70.1 CHEMOTHERAPY INDUCED NEUTROPENIA: Primary | ICD-10-CM

## 2018-03-15 DIAGNOSIS — C50.812 MALIGNANT NEOPLASM OF OVERLAPPING SITES OF BOTH BREASTS IN FEMALE, ESTROGEN RECEPTOR POSITIVE: ICD-10-CM

## 2018-03-15 DIAGNOSIS — C50.011 MALIGNANT NEOPLASM OF NIPPLE OF RIGHT BREAST IN FEMALE, UNSPECIFIED ESTROGEN RECEPTOR STATUS: Primary | ICD-10-CM

## 2018-03-15 DIAGNOSIS — D64.81 ANTINEOPLASTIC CHEMOTHERAPY INDUCED ANEMIA: ICD-10-CM

## 2018-03-15 DIAGNOSIS — C50.811 MALIGNANT NEOPLASM OF OVERLAPPING SITES OF BOTH BREASTS IN FEMALE, ESTROGEN RECEPTOR POSITIVE: ICD-10-CM

## 2018-03-15 DIAGNOSIS — T45.1X5A ANTINEOPLASTIC CHEMOTHERAPY INDUCED ANEMIA: ICD-10-CM

## 2018-03-15 DIAGNOSIS — I10 BENIGN ESSENTIAL HTN: ICD-10-CM

## 2018-03-15 DIAGNOSIS — T45.1X5A CHEMOTHERAPY INDUCED NEUTROPENIA: Primary | ICD-10-CM

## 2018-03-15 PROCEDURE — A4216 STERILE WATER/SALINE, 10 ML: HCPCS | Mod: PN | Performed by: INTERNAL MEDICINE

## 2018-03-15 PROCEDURE — 25000003 PHARM REV CODE 250: Mod: PN | Performed by: INTERNAL MEDICINE

## 2018-03-15 PROCEDURE — 96367 TX/PROPH/DG ADDL SEQ IV INF: CPT | Mod: PN

## 2018-03-15 PROCEDURE — 96375 TX/PRO/DX INJ NEW DRUG ADDON: CPT | Mod: PN

## 2018-03-15 PROCEDURE — 99215 OFFICE O/P EST HI 40 MIN: CPT | Mod: S$GLB,,, | Performed by: INTERNAL MEDICINE

## 2018-03-15 PROCEDURE — 63600175 PHARM REV CODE 636 W HCPCS: Mod: PN | Performed by: INTERNAL MEDICINE

## 2018-03-15 PROCEDURE — 99999 PR PBB SHADOW E&M-EST. PATIENT-LVL III: CPT | Mod: PBBFAC,,, | Performed by: INTERNAL MEDICINE

## 2018-03-15 PROCEDURE — S0028 INJECTION, FAMOTIDINE, 20 MG: HCPCS | Mod: PN | Performed by: INTERNAL MEDICINE

## 2018-03-15 PROCEDURE — 96413 CHEMO IV INFUSION 1 HR: CPT | Mod: PN

## 2018-03-15 RX ORDER — SODIUM CHLORIDE 0.9 % (FLUSH) 0.9 %
10 SYRINGE (ML) INJECTION
Status: CANCELLED | OUTPATIENT
Start: 2018-03-15

## 2018-03-15 RX ORDER — EPINEPHRINE 0.3 MG/.3ML
0.3 INJECTION SUBCUTANEOUS ONCE AS NEEDED
Status: CANCELLED | OUTPATIENT
Start: 2018-03-15 | End: 2018-03-15

## 2018-03-15 RX ORDER — EPINEPHRINE 0.3 MG/.3ML
0.3 INJECTION SUBCUTANEOUS ONCE AS NEEDED
Status: DISCONTINUED | OUTPATIENT
Start: 2018-03-15 | End: 2018-03-15 | Stop reason: HOSPADM

## 2018-03-15 RX ORDER — HEPARIN 100 UNIT/ML
500 SYRINGE INTRAVENOUS
Status: CANCELLED | OUTPATIENT
Start: 2018-03-15

## 2018-03-15 RX ORDER — DIPHENHYDRAMINE HYDROCHLORIDE 50 MG/ML
50 INJECTION INTRAMUSCULAR; INTRAVENOUS ONCE AS NEEDED
Status: DISCONTINUED | OUTPATIENT
Start: 2018-03-15 | End: 2018-03-15 | Stop reason: HOSPADM

## 2018-03-15 RX ORDER — SODIUM CHLORIDE 0.9 % (FLUSH) 0.9 %
10 SYRINGE (ML) INJECTION
Status: DISCONTINUED | OUTPATIENT
Start: 2018-03-15 | End: 2018-03-15 | Stop reason: HOSPADM

## 2018-03-15 RX ORDER — FAMOTIDINE 10 MG/ML
20 INJECTION INTRAVENOUS
Status: COMPLETED | OUTPATIENT
Start: 2018-03-15 | End: 2018-03-15

## 2018-03-15 RX ORDER — DIPHENHYDRAMINE HYDROCHLORIDE 50 MG/ML
25 INJECTION INTRAMUSCULAR; INTRAVENOUS
Status: CANCELLED
Start: 2018-03-15 | End: 2018-03-15

## 2018-03-15 RX ORDER — FAMOTIDINE 10 MG/ML
20 INJECTION INTRAVENOUS
Status: CANCELLED | OUTPATIENT
Start: 2018-03-15

## 2018-03-15 RX ORDER — DIPHENHYDRAMINE HYDROCHLORIDE 50 MG/ML
25 INJECTION INTRAMUSCULAR; INTRAVENOUS
Status: COMPLETED | OUTPATIENT
Start: 2018-03-15 | End: 2018-03-15

## 2018-03-15 RX ORDER — HEPARIN 100 UNIT/ML
500 SYRINGE INTRAVENOUS
Status: DISCONTINUED | OUTPATIENT
Start: 2018-03-15 | End: 2018-03-15 | Stop reason: HOSPADM

## 2018-03-15 RX ORDER — DIPHENHYDRAMINE HYDROCHLORIDE 50 MG/ML
50 INJECTION INTRAMUSCULAR; INTRAVENOUS ONCE AS NEEDED
Status: CANCELLED | OUTPATIENT
Start: 2018-03-15 | End: 2018-03-15

## 2018-03-15 RX ADMIN — PACLITAXEL 156 MG: 6 INJECTION, SOLUTION INTRAVENOUS at 01:03

## 2018-03-15 RX ADMIN — FAMOTIDINE 20 MG: 10 INJECTION INTRAVENOUS at 12:03

## 2018-03-15 RX ADMIN — DEXAMETHASONE SODIUM PHOSPHATE 10 MG: 4 INJECTION, SOLUTION INTRA-ARTICULAR; INTRALESIONAL; INTRAMUSCULAR; INTRAVENOUS; SOFT TISSUE at 12:03

## 2018-03-15 RX ADMIN — SODIUM CHLORIDE, PRESERVATIVE FREE 10 ML: 5 INJECTION INTRAVENOUS at 12:03

## 2018-03-15 RX ADMIN — SODIUM CHLORIDE: 0.9 INJECTION, SOLUTION INTRAVENOUS at 12:03

## 2018-03-15 RX ADMIN — DIPHENHYDRAMINE HYDROCHLORIDE 25 MG: 50 INJECTION, SOLUTION INTRAMUSCULAR; INTRAVENOUS at 12:03

## 2018-03-15 RX ADMIN — SODIUM CHLORIDE, PRESERVATIVE FREE 10 ML: 5 INJECTION INTRAVENOUS at 02:03

## 2018-03-15 NOTE — PATIENT INSTRUCTIONS
Discharge Instructions for Hypokalemia  You have been diagnosed with hypokalemia. This means you have a low level of potassium in your blood. Potassium helps your nerve and muscle cells work as they should. These cells include the cells in your heart. A low level of potassium in the blood can cause serious problems, such as abnormal heart rhythms and even heart attack.  Diet changes  Eat more potassium-rich foods:  · Bananas  · Oranges and orange juice  · Tomatoes, tomato sauce, and tomato juice  · Leafy green vegetables, such as spinach, kale, salad greens, collards, and chard  · Melons (all kinds)  · Pomegranates  · Peas  · Beans  · Potatoes  · Sweet potatoes  · Avocados, including guacamole  · Vegetable juices, such as V8  · Fruit juices  · All nuts and seeds  · Fish, including tuna, halibut, salmon, cod, snapper, moses, swordfish, and perch  · Milk, including fat-free, low-fat, whole, chocolate, and buttermilk  · Soy milk  Other home care  · Take a potassium supplement as directed by your healthcare provider.  · After strenuous exercise or any activity that causes you to sweat a lot, grab a beverage high in potassium. This includes chocolate milk, coconut water, orange juice, or low-sodium vegetable juices.  · Be sure to eat foods or drink fluids that contain potassium if you are having diarrhea or vomiting.  · Have your potassium levels checked regularly.  · Take all medicines exactly as directed.  · Tell your healthcare provider about all prescription and ove-the-counter medicines you are taking. This includes herbal products.  · Avoid foods that are high in salt. Avoid canned and prepared foods that are high in salt.  Follow-up  · Make a follow-up appointment as directed by our staff.  · Keep all follow-up appointments. Your healthcare provider needs to monitor your condition closely.     When to call your healthcare provider  Call your provider right away or go to the emergency room if you have any of  the following:  · Vomiting  · Fatigue  · Diarrhea  · Rapid, irregular heartbeat  · Shortness of breath  · Chest pain  · Muscle cramps, spasms, or twitching  · Weakness  · Paralysis   Date Last Reviewed: 6/20/2015 © 2000-2017 [employer]. [address] [place] [id number]. All rights reserved. This information is not intended as a substitute for professional medical care. Always follow your healthcare professional's instructions.

## 2018-03-22 ENCOUNTER — OFFICE VISIT (OUTPATIENT)
Dept: HEMATOLOGY/ONCOLOGY | Facility: CLINIC | Age: 63
End: 2018-03-22
Payer: COMMERCIAL

## 2018-03-22 ENCOUNTER — INFUSION (OUTPATIENT)
Dept: INFUSION THERAPY | Facility: HOSPITAL | Age: 63
End: 2018-03-22
Attending: INTERNAL MEDICINE
Payer: COMMERCIAL

## 2018-03-22 VITALS
SYSTOLIC BLOOD PRESSURE: 146 MMHG | HEART RATE: 100 BPM | HEIGHT: 64 IN | RESPIRATION RATE: 20 BRPM | TEMPERATURE: 98 F | DIASTOLIC BLOOD PRESSURE: 75 MMHG | WEIGHT: 188.25 LBS | BODY MASS INDEX: 32.14 KG/M2

## 2018-03-22 VITALS
WEIGHT: 188.25 LBS | RESPIRATION RATE: 16 BRPM | DIASTOLIC BLOOD PRESSURE: 75 MMHG | BODY MASS INDEX: 32.14 KG/M2 | SYSTOLIC BLOOD PRESSURE: 113 MMHG | TEMPERATURE: 98 F | HEART RATE: 87 BPM | HEIGHT: 64 IN

## 2018-03-22 DIAGNOSIS — D64.81 ANTINEOPLASTIC CHEMOTHERAPY INDUCED ANEMIA: ICD-10-CM

## 2018-03-22 DIAGNOSIS — Z17.0 MALIGNANT NEOPLASM OF OVERLAPPING SITES OF BOTH BREASTS IN FEMALE, ESTROGEN RECEPTOR POSITIVE: ICD-10-CM

## 2018-03-22 DIAGNOSIS — T45.1X5A ANTINEOPLASTIC CHEMOTHERAPY INDUCED ANEMIA: ICD-10-CM

## 2018-03-22 DIAGNOSIS — C50.811 MALIGNANT NEOPLASM OF OVERLAPPING SITES OF BOTH BREASTS IN FEMALE, ESTROGEN RECEPTOR POSITIVE: ICD-10-CM

## 2018-03-22 DIAGNOSIS — C50.812 MALIGNANT NEOPLASM OF OVERLAPPING SITES OF BOTH BREASTS IN FEMALE, ESTROGEN RECEPTOR POSITIVE: ICD-10-CM

## 2018-03-22 DIAGNOSIS — T45.1X5A CHEMOTHERAPY INDUCED NEUTROPENIA: Primary | ICD-10-CM

## 2018-03-22 DIAGNOSIS — C50.011 MALIGNANT NEOPLASM OF NIPPLE OF RIGHT BREAST IN FEMALE, UNSPECIFIED ESTROGEN RECEPTOR STATUS: Primary | ICD-10-CM

## 2018-03-22 DIAGNOSIS — D70.1 CHEMOTHERAPY INDUCED NEUTROPENIA: Primary | ICD-10-CM

## 2018-03-22 PROCEDURE — 63600175 PHARM REV CODE 636 W HCPCS: Mod: PN | Performed by: INTERNAL MEDICINE

## 2018-03-22 PROCEDURE — S0028 INJECTION, FAMOTIDINE, 20 MG: HCPCS | Mod: PN | Performed by: INTERNAL MEDICINE

## 2018-03-22 PROCEDURE — 96413 CHEMO IV INFUSION 1 HR: CPT | Mod: PN

## 2018-03-22 PROCEDURE — 96367 TX/PROPH/DG ADDL SEQ IV INF: CPT | Mod: PN

## 2018-03-22 PROCEDURE — 99215 OFFICE O/P EST HI 40 MIN: CPT | Mod: S$GLB,,, | Performed by: INTERNAL MEDICINE

## 2018-03-22 PROCEDURE — A4216 STERILE WATER/SALINE, 10 ML: HCPCS | Mod: PN | Performed by: INTERNAL MEDICINE

## 2018-03-22 PROCEDURE — 99999 PR PBB SHADOW E&M-EST. PATIENT-LVL III: CPT | Mod: PBBFAC,,, | Performed by: INTERNAL MEDICINE

## 2018-03-22 PROCEDURE — 96375 TX/PRO/DX INJ NEW DRUG ADDON: CPT | Mod: PN

## 2018-03-22 PROCEDURE — 25000003 PHARM REV CODE 250: Mod: PN | Performed by: INTERNAL MEDICINE

## 2018-03-22 RX ORDER — SODIUM CHLORIDE 0.9 % (FLUSH) 0.9 %
10 SYRINGE (ML) INJECTION
Status: CANCELLED | OUTPATIENT
Start: 2018-03-22

## 2018-03-22 RX ORDER — DIPHENHYDRAMINE HYDROCHLORIDE 50 MG/ML
50 INJECTION INTRAMUSCULAR; INTRAVENOUS ONCE AS NEEDED
Status: CANCELLED | OUTPATIENT
Start: 2018-03-22 | End: 2018-03-22

## 2018-03-22 RX ORDER — DIPHENHYDRAMINE HYDROCHLORIDE 50 MG/ML
25 INJECTION INTRAMUSCULAR; INTRAVENOUS
Status: COMPLETED | OUTPATIENT
Start: 2018-03-22 | End: 2018-03-22

## 2018-03-22 RX ORDER — SODIUM CHLORIDE 0.9 % (FLUSH) 0.9 %
10 SYRINGE (ML) INJECTION
Status: DISCONTINUED | OUTPATIENT
Start: 2018-03-22 | End: 2018-03-22 | Stop reason: HOSPADM

## 2018-03-22 RX ORDER — HEPARIN 100 UNIT/ML
500 SYRINGE INTRAVENOUS
Status: CANCELLED | OUTPATIENT
Start: 2018-03-22

## 2018-03-22 RX ORDER — EPINEPHRINE 0.3 MG/.3ML
0.3 INJECTION SUBCUTANEOUS ONCE AS NEEDED
Status: DISCONTINUED | OUTPATIENT
Start: 2018-03-22 | End: 2018-03-22 | Stop reason: HOSPADM

## 2018-03-22 RX ORDER — FAMOTIDINE 10 MG/ML
20 INJECTION INTRAVENOUS
Status: CANCELLED | OUTPATIENT
Start: 2018-03-22

## 2018-03-22 RX ORDER — EPINEPHRINE 0.3 MG/.3ML
0.3 INJECTION SUBCUTANEOUS ONCE AS NEEDED
Status: CANCELLED | OUTPATIENT
Start: 2018-03-22 | End: 2018-03-22

## 2018-03-22 RX ORDER — FAMOTIDINE 10 MG/ML
20 INJECTION INTRAVENOUS
Status: COMPLETED | OUTPATIENT
Start: 2018-03-22 | End: 2018-03-22

## 2018-03-22 RX ORDER — DIPHENHYDRAMINE HYDROCHLORIDE 50 MG/ML
50 INJECTION INTRAMUSCULAR; INTRAVENOUS ONCE AS NEEDED
Status: DISCONTINUED | OUTPATIENT
Start: 2018-03-22 | End: 2018-03-22 | Stop reason: HOSPADM

## 2018-03-22 RX ORDER — DIPHENHYDRAMINE HYDROCHLORIDE 50 MG/ML
25 INJECTION INTRAMUSCULAR; INTRAVENOUS
Status: CANCELLED
Start: 2018-03-22 | End: 2018-03-22

## 2018-03-22 RX ADMIN — DEXAMETHASONE SODIUM PHOSPHATE 10 MG: 4 INJECTION, SOLUTION INTRA-ARTICULAR; INTRALESIONAL; INTRAMUSCULAR; INTRAVENOUS; SOFT TISSUE at 01:03

## 2018-03-22 RX ADMIN — PACLITAXEL 156 MG: 6 INJECTION, SOLUTION, CONCENTRATE INTRAVENOUS at 01:03

## 2018-03-22 RX ADMIN — SODIUM CHLORIDE, PRESERVATIVE FREE 10 ML: 5 INJECTION INTRAVENOUS at 01:03

## 2018-03-22 RX ADMIN — FAMOTIDINE 20 MG: 10 INJECTION INTRAVENOUS at 01:03

## 2018-03-22 RX ADMIN — DIPHENHYDRAMINE HYDROCHLORIDE 25 MG: 50 INJECTION, SOLUTION INTRAMUSCULAR; INTRAVENOUS at 01:03

## 2018-03-22 RX ADMIN — SODIUM CHLORIDE: 0.9 INJECTION, SOLUTION INTRAVENOUS at 01:03

## 2018-03-22 NOTE — PLAN OF CARE
Problem: Patient Care Overview  Goal: Plan of Care Review  Outcome: Outcome(s) achieved Date Met: 03/22/18  Pt tolerated treatment. No complaints. All questions were answered.    Problem: Oncology Care (Adult)  Goal: Signs and Symptoms of Listed Potential Problems Will be Absent, Minimized or Managed (Oncology Care)  Signs and symptoms of listed potential problems will be absent, minimized or managed by discharge/transition of care (reference Oncology Care (Adult) [facility]).   Outcome: Outcome(s) achieved Date Met: 03/22/18  No complaints.

## 2018-03-22 NOTE — PROGRESS NOTES
Date of Service: 12/22/2017  Ga Cross is 62 years of age  woman  for newly diagnosed breast cancer.start of AC chemotherapy.  The patient had self   examination and found a lump.  She has undergone bilateral mastectomies with Dr. Cardenas.  Pathology reveals three sentinel lymph nodes on the right side, one   of which showed micrometastases size of 0.8 mm without extranodal extension.    The patient shows invasive ductal carcinoma 1.4 cm with low-grade ductal   carcinoma in situ on the right side.  On the left breast, the patient shows no   evidence of lymph node metastases on sentinel lymph node evaluation, focal   low-grade LCIS and DCIS on the left side, but no invasive component.  The   patient's invasive ductal carcinoma total size is about 2.3 cm, status post   mastectomy.  She stages as a T2 N1 MX.  The patient has the invasive ductal   carcinoma showing ER positivity, DC positivity, and HER-2 negativity by FISH.    Has seen xrt in consult, DR. Graham  Completed AC started taxol last week  FAMILY HISTORY:  Noncontributory.  There is heart disease and dyslipidemia,   strokes and asthma.    SOCIAL HISTORY:  Nonsmoker, no recreational drug or alcohol use.  Occasional   social alcohol only.    SURGICAL HISTORY:  Significant for cholecystectomy, tonsillectomy and bilateral   mastectomies now.    MEDICAL HISTORY:  Significant for dyslipidemia, hypertension, mitral valve   prolapse, seasonal allergies.    MEDICATIONS:  Include Norvasc, cyclobenzaprine, HydroDIURIL, ibuprofen.    ALLERGIES:  She is allergic to hydromorphone and penicillin.    PHYSICAL EXAMINATION:   VITAL SIGNS:  Reveals the patient with 185 pound weight.  BSA 1.94, height of 5   feet 4 inches, no pain.  Wt Readings from Last 3 Encounters:   03/22/18 85.4 kg (188 lb 4.4 oz)   03/15/18 86 kg (189 lb 9.5 oz)   03/15/18 86.9 kg (191 lb 9.3 oz)     Temp Readings from Last 3 Encounters:   03/22/18 98.4 °F (36.9 °C)   03/15/18 98.2 °F  (36.8 °C)   03/15/18 98.2 °F (36.8 °C)     BP Readings from Last 3 Encounters:   03/22/18 (!) 146/75   03/15/18 (!) 150/77   03/15/18 139/87     Pulse Readings from Last 3 Encounters:   03/22/18 100   03/15/18 89   03/15/18 89   GENERAL:  Awake, alert, oriented.  BREASTS:  Double mastectomy is healing well.  LUNGS:  Clear to auscultation.  No JVD.  Trachea is central.  CVS:  S1, S2 normally heard.  No murmurs or gallops.  ABDOMEN:  Soft.  No rebound, guarding or rigidity.  EXTREMITIES:  Without edema.  NEUROLOGICAL:  The patient is appropriate.    LABS:  Labs from   Lab Results   Component Value Date    WBC 4.71 03/22/2018    HGB 9.9 (L) 03/22/2018    HCT 29.3 (L) 03/22/2018    MCV 94 03/22/2018     03/22/2018     CMP  Sodium   Date Value Ref Range Status   03/22/2018 140 136 - 145 mmol/L Final     Potassium   Date Value Ref Range Status   03/22/2018 3.4 (L) 3.5 - 5.1 mmol/L Final     Chloride   Date Value Ref Range Status   03/22/2018 97 95 - 110 mmol/L Final     CO2   Date Value Ref Range Status   03/22/2018 32 (H) 22 - 31 mmol/L Final     Glucose   Date Value Ref Range Status   03/22/2018 144 (H) 70 - 110 mg/dL Final     Comment:     The ADA recommends the following guidelines for fasting glucose:  Normal:       less than 100 mg/dL  Prediabetes:  100 mg/dL to 125 mg/dL  Diabetes:     126 mg/dL or higher       BUN, Bld   Date Value Ref Range Status   03/22/2018 17 7 - 18 mg/dL Final     Creatinine   Date Value Ref Range Status   03/22/2018 0.64 0.50 - 1.40 mg/dL Final     Calcium   Date Value Ref Range Status   03/22/2018 9.0 8.4 - 10.2 mg/dL Final     Total Protein   Date Value Ref Range Status   03/22/2018 6.1 6.0 - 8.4 g/dL Final     Albumin   Date Value Ref Range Status   03/22/2018 3.8 3.5 - 5.2 g/dL Final     Total Bilirubin   Date Value Ref Range Status   03/22/2018 0.6 0.2 - 1.3 mg/dL Final     Alkaline Phosphatase   Date Value Ref Range Status   03/22/2018 71 38 - 145 U/L Final     AST ([facility])   Date Value Ref Range Status   01/18/2016 22 14 - 36 U/L Final     AST   Date Value Ref Range Status   03/22/2018 19 14 - 36 U/L Final     ALT   Date Value Ref Range Status   03/22/2018 23 10 - 44 U/L Final     Anion Gap   Date Value Ref Range Status   03/22/2018 11 8 - 16 mmol/L Final     eGFR if    Date Value Ref Range Status   03/22/2018 >60 >60 mL/min/1.73 m^2 Final     eGFR if non    Date Value Ref Range Status   03/22/2018 >60 >60 mL/min/1.73 m^2 Final     Comment:     Calculation used to obtain the estimated glomerular filtration  rate (eGFR) is the CKD-EPI equation.      PATHOLOGY:  As mentioned above.    IMPRESSION:  T2 N1 MX breast cancer with DCIS on left side and invasive   component on the right side.  ER/SD positive, HER-2 negative.  PET done , neg for mets  PLAN:       continue with taxol       rtc 1weeks with cbc, cmp for cycle #4 /12 then xrt+arimidex

## 2018-03-29 ENCOUNTER — OFFICE VISIT (OUTPATIENT)
Dept: HEMATOLOGY/ONCOLOGY | Facility: CLINIC | Age: 63
End: 2018-03-29
Payer: COMMERCIAL

## 2018-03-29 ENCOUNTER — INFUSION (OUTPATIENT)
Dept: INFUSION THERAPY | Facility: HOSPITAL | Age: 63
End: 2018-03-29
Attending: INTERNAL MEDICINE
Payer: COMMERCIAL

## 2018-03-29 VITALS
SYSTOLIC BLOOD PRESSURE: 148 MMHG | HEIGHT: 64 IN | WEIGHT: 190.06 LBS | TEMPERATURE: 99 F | RESPIRATION RATE: 16 BRPM | DIASTOLIC BLOOD PRESSURE: 76 MMHG | BODY MASS INDEX: 32.45 KG/M2 | HEART RATE: 96 BPM

## 2018-03-29 VITALS
RESPIRATION RATE: 16 BRPM | BODY MASS INDEX: 32.45 KG/M2 | HEART RATE: 87 BPM | DIASTOLIC BLOOD PRESSURE: 81 MMHG | WEIGHT: 190.06 LBS | SYSTOLIC BLOOD PRESSURE: 135 MMHG | HEIGHT: 64 IN | TEMPERATURE: 99 F

## 2018-03-29 DIAGNOSIS — T45.1X5A ANTINEOPLASTIC CHEMOTHERAPY INDUCED ANEMIA: ICD-10-CM

## 2018-03-29 DIAGNOSIS — C50.011 MALIGNANT NEOPLASM INVOLVING BOTH NIPPLE AND AREOLA OF RIGHT BREAST IN FEMALE, UNSPECIFIED ESTROGEN RECEPTOR STATUS: Primary | ICD-10-CM

## 2018-03-29 DIAGNOSIS — C50.812 MALIGNANT NEOPLASM OF OVERLAPPING SITES OF BOTH BREASTS IN FEMALE, ESTROGEN RECEPTOR POSITIVE: ICD-10-CM

## 2018-03-29 DIAGNOSIS — C50.811 MALIGNANT NEOPLASM OF OVERLAPPING SITES OF BOTH BREASTS IN FEMALE, ESTROGEN RECEPTOR POSITIVE: ICD-10-CM

## 2018-03-29 DIAGNOSIS — T45.1X5A CHEMOTHERAPY INDUCED NEUTROPENIA: Primary | ICD-10-CM

## 2018-03-29 DIAGNOSIS — Z17.0 MALIGNANT NEOPLASM OF OVERLAPPING SITES OF BOTH BREASTS IN FEMALE, ESTROGEN RECEPTOR POSITIVE: ICD-10-CM

## 2018-03-29 DIAGNOSIS — E78.00 PURE HYPERCHOLESTEROLEMIA: ICD-10-CM

## 2018-03-29 DIAGNOSIS — D64.81 ANTINEOPLASTIC CHEMOTHERAPY INDUCED ANEMIA: ICD-10-CM

## 2018-03-29 DIAGNOSIS — D70.1 CHEMOTHERAPY INDUCED NEUTROPENIA: Primary | ICD-10-CM

## 2018-03-29 PROCEDURE — 63600175 PHARM REV CODE 636 W HCPCS: Mod: PN | Performed by: INTERNAL MEDICINE

## 2018-03-29 PROCEDURE — 99215 OFFICE O/P EST HI 40 MIN: CPT | Mod: S$GLB,,, | Performed by: INTERNAL MEDICINE

## 2018-03-29 PROCEDURE — A4216 STERILE WATER/SALINE, 10 ML: HCPCS | Mod: PN | Performed by: INTERNAL MEDICINE

## 2018-03-29 PROCEDURE — 96367 TX/PROPH/DG ADDL SEQ IV INF: CPT | Mod: PN

## 2018-03-29 PROCEDURE — 99999 PR PBB SHADOW E&M-EST. PATIENT-LVL III: CPT | Mod: PBBFAC,,, | Performed by: INTERNAL MEDICINE

## 2018-03-29 PROCEDURE — 96372 THER/PROPH/DIAG INJ SC/IM: CPT | Mod: PN

## 2018-03-29 PROCEDURE — 96413 CHEMO IV INFUSION 1 HR: CPT | Mod: PN

## 2018-03-29 PROCEDURE — S0028 INJECTION, FAMOTIDINE, 20 MG: HCPCS | Mod: PN | Performed by: INTERNAL MEDICINE

## 2018-03-29 PROCEDURE — 96375 TX/PRO/DX INJ NEW DRUG ADDON: CPT | Mod: PN

## 2018-03-29 PROCEDURE — 25000003 PHARM REV CODE 250: Mod: PN | Performed by: INTERNAL MEDICINE

## 2018-03-29 RX ORDER — SODIUM CHLORIDE 0.9 % (FLUSH) 0.9 %
10 SYRINGE (ML) INJECTION
Status: DISCONTINUED | OUTPATIENT
Start: 2018-03-29 | End: 2018-03-29 | Stop reason: HOSPADM

## 2018-03-29 RX ORDER — DIPHENHYDRAMINE HYDROCHLORIDE 50 MG/ML
50 INJECTION INTRAMUSCULAR; INTRAVENOUS ONCE AS NEEDED
Status: CANCELLED | OUTPATIENT
Start: 2018-03-29 | End: 2018-03-29

## 2018-03-29 RX ORDER — HEPARIN 100 UNIT/ML
500 SYRINGE INTRAVENOUS
Status: DISCONTINUED | OUTPATIENT
Start: 2018-03-29 | End: 2018-03-29 | Stop reason: HOSPADM

## 2018-03-29 RX ORDER — DIPHENHYDRAMINE HYDROCHLORIDE 50 MG/ML
25 INJECTION INTRAMUSCULAR; INTRAVENOUS
Status: COMPLETED | OUTPATIENT
Start: 2018-03-29 | End: 2018-03-29

## 2018-03-29 RX ORDER — DIPHENHYDRAMINE HYDROCHLORIDE 50 MG/ML
50 INJECTION INTRAMUSCULAR; INTRAVENOUS ONCE AS NEEDED
Status: DISCONTINUED | OUTPATIENT
Start: 2018-03-29 | End: 2018-03-29 | Stop reason: HOSPADM

## 2018-03-29 RX ORDER — HEPARIN 100 UNIT/ML
500 SYRINGE INTRAVENOUS
Status: CANCELLED | OUTPATIENT
Start: 2018-03-29

## 2018-03-29 RX ORDER — EPINEPHRINE 0.3 MG/.3ML
0.3 INJECTION SUBCUTANEOUS ONCE AS NEEDED
Status: CANCELLED | OUTPATIENT
Start: 2018-03-29 | End: 2018-03-29

## 2018-03-29 RX ORDER — FAMOTIDINE 10 MG/ML
20 INJECTION INTRAVENOUS
Status: CANCELLED | OUTPATIENT
Start: 2018-03-29

## 2018-03-29 RX ORDER — SODIUM CHLORIDE 0.9 % (FLUSH) 0.9 %
10 SYRINGE (ML) INJECTION
Status: CANCELLED | OUTPATIENT
Start: 2018-03-29

## 2018-03-29 RX ORDER — DIPHENHYDRAMINE HYDROCHLORIDE 50 MG/ML
25 INJECTION INTRAMUSCULAR; INTRAVENOUS
Status: CANCELLED
Start: 2018-03-29 | End: 2018-03-29

## 2018-03-29 RX ORDER — FAMOTIDINE 10 MG/ML
20 INJECTION INTRAVENOUS
Status: COMPLETED | OUTPATIENT
Start: 2018-03-29 | End: 2018-03-29

## 2018-03-29 RX ORDER — EPINEPHRINE 0.3 MG/.3ML
0.3 INJECTION SUBCUTANEOUS ONCE AS NEEDED
Status: DISCONTINUED | OUTPATIENT
Start: 2018-03-29 | End: 2018-03-29 | Stop reason: HOSPADM

## 2018-03-29 RX ADMIN — SODIUM CHLORIDE, PRESERVATIVE FREE 10 ML: 5 INJECTION INTRAVENOUS at 02:03

## 2018-03-29 RX ADMIN — DIPHENHYDRAMINE HYDROCHLORIDE 25 MG: 50 INJECTION, SOLUTION INTRAMUSCULAR; INTRAVENOUS at 02:03

## 2018-03-29 RX ADMIN — PACLITAXEL 156 MG: 6 INJECTION, SOLUTION, CONCENTRATE INTRAVENOUS at 02:03

## 2018-03-29 RX ADMIN — DEXAMETHASONE SODIUM PHOSPHATE 10 MG: 4 INJECTION, SOLUTION INTRA-ARTICULAR; INTRALESIONAL; INTRAMUSCULAR; INTRAVENOUS; SOFT TISSUE at 02:03

## 2018-03-29 RX ADMIN — SODIUM CHLORIDE: 0.9 INJECTION, SOLUTION INTRAVENOUS at 02:03

## 2018-03-29 RX ADMIN — ERYTHROPOIETIN 40000 UNITS: 40000 INJECTION, SOLUTION INTRAVENOUS; SUBCUTANEOUS at 02:03

## 2018-03-29 RX ADMIN — FAMOTIDINE 20 MG: 10 INJECTION INTRAVENOUS at 02:03

## 2018-03-29 NOTE — PROGRESS NOTES
Date of Service: 12/22/2017  Ga Cross is 62 years of age  woman  for newly diagnosed breast cancer.start of AC chemotherapy.  The patient had self   examination and found a lump.  She has undergone bilateral mastectomies with Dr. Cardenas.  Pathology reveals three sentinel lymph nodes on the right side, one   of which showed micrometastases size of 0.8 mm without extranodal extension.    The patient shows invasive ductal carcinoma 1.4 cm with low-grade ductal   carcinoma in situ on the right side.  On the left breast, the patient shows no   evidence of lymph node metastases on sentinel lymph node evaluation, focal   low-grade LCIS and DCIS on the left side, but no invasive component.  The   patient's invasive ductal carcinoma total size is about 2.3 cm, status post   mastectomy.  She stages as a T2 N1 MX.  The patient has the invasive ductal   carcinoma showing ER positivity, VT positivity, and HER-2 negativity by FISH.    Has seen xrt in consult, DR. Graham  Completed AC started taxol last week feeling tired now  FAMILY HISTORY:  Noncontributory.  There is heart disease and dyslipidemia,   strokes and asthma.    SOCIAL HISTORY:  Nonsmoker, no recreational drug or alcohol use.  Occasional   social alcohol only.    SURGICAL HISTORY:  Significant for cholecystectomy, tonsillectomy and bilateral   mastectomies now.    MEDICAL HISTORY:  Significant for dyslipidemia, hypertension, mitral valve   prolapse, seasonal allergies.    MEDICATIONS:  Include Norvasc, cyclobenzaprine, HydroDIURIL, ibuprofen.    ALLERGIES:  She is allergic to hydromorphone and penicillin.    PHYSICAL EXAMINATION:   VITAL SIGNS:  Reveals the patient with 185 pound weight.  BSA 1.94, height of 5   feet 4 inches, no pain.  Wt Readings from Last 3 Encounters:   03/29/18 86.2 kg (190 lb 0.6 oz)   03/22/18 85.4 kg (188 lb 4.4 oz)   03/22/18 85.4 kg (188 lb 4.4 oz)     Temp Readings from Last 3 Encounters:   03/29/18 99.1 °F (37.3 °C)    03/22/18 98.4 °F (36.9 °C)   03/22/18 98.4 °F (36.9 °C)     BP Readings from Last 3 Encounters:   03/29/18 (!) 148/76   03/22/18 113/75   03/22/18 (!) 146/75     Pulse Readings from Last 3 Encounters:   03/29/18 96   03/22/18 87   03/22/18 100   GENERAL:  Awake, alert, oriented.  BREASTS:  Double mastectomy is healing well.  LUNGS:  Clear to auscultation.  No JVD.  Trachea is central.  CVS:  S1, S2 normally heard.  No murmurs or gallops.  ABDOMEN:  Soft.  No rebound, guarding or rigidity.  EXTREMITIES:  Without edema.  NEUROLOGICAL:  The patient is appropriate.    LABS:  Labs from   Lab Results   Component Value Date    WBC 4.42 03/29/2018    HGB 9.5 (L) 03/29/2018    HCT 28.1 (L) 03/29/2018    MCV 94 03/29/2018     03/29/2018     CMP  Sodium   Date Value Ref Range Status   03/29/2018 140 136 - 145 mmol/L Final     Potassium   Date Value Ref Range Status   03/29/2018 3.3 (L) 3.5 - 5.1 mmol/L Final     Chloride   Date Value Ref Range Status   03/29/2018 99 95 - 110 mmol/L Final     CO2   Date Value Ref Range Status   03/29/2018 30 22 - 31 mmol/L Final     Glucose   Date Value Ref Range Status   03/29/2018 124 (H) 70 - 110 mg/dL Final     Comment:     The ADA recommends the following guidelines for fasting glucose:  Normal:       less than 100 mg/dL  Prediabetes:  100 mg/dL to 125 mg/dL  Diabetes:     126 mg/dL or higher       BUN, Bld   Date Value Ref Range Status   03/29/2018 14 7 - 18 mg/dL Final     Creatinine   Date Value Ref Range Status   03/29/2018 0.62 0.50 - 1.40 mg/dL Final     Calcium   Date Value Ref Range Status   03/29/2018 9.4 8.4 - 10.2 mg/dL Final     Total Protein   Date Value Ref Range Status   03/29/2018 6.0 6.0 - 8.4 g/dL Final     Albumin   Date Value Ref Range Status   03/29/2018 3.7 3.5 - 5.2 g/dL Final     Total Bilirubin   Date Value Ref Range Status   03/29/2018 0.6 0.2 - 1.3 mg/dL Final     Alkaline Phosphatase   Date Value Ref Range Status   03/29/2018 70 38 - 145 U/L Final      AST ([facility])   Date Value Ref Range Status   01/18/2016 22 14 - 36 U/L Final     AST   Date Value Ref Range Status   03/29/2018 17 14 - 36 U/L Final     ALT   Date Value Ref Range Status   03/29/2018 28 10 - 44 U/L Final     Anion Gap   Date Value Ref Range Status   03/29/2018 11 8 - 16 mmol/L Final     eGFR if    Date Value Ref Range Status   03/29/2018 >60 >60 mL/min/1.73 m^2 Final     eGFR if non    Date Value Ref Range Status   03/29/2018 >60 >60 mL/min/1.73 m^2 Final     Comment:     Calculation used to obtain the estimated glomerular filtration  rate (eGFR) is the CKD-EPI equation.      PATHOLOGY:  As mentioned above.    IMPRESSION:  T2 N1 MX breast cancer with DCIS on left side and invasive   component on the right side.  ER/IA positive, HER-2 negative.  PET done , neg for mets  PLAN:       continue with taxol       rtc 1weeks with cbc, cmp for cycle #4 /12 then xrt+arimidex   add procrit weekly

## 2018-03-29 NOTE — PLAN OF CARE
Problem: Oncology Care (Adult)  Goal: Signs and Symptoms of Listed Potential Problems Will be Absent, Minimized or Managed (Oncology Care)  Signs and symptoms of listed potential problems will be absent, minimized or managed by discharge/transition of care (reference Oncology Care (Adult) [facility]).   Outcome: Outcome(s) achieved Date Met: 03/29/18  No complaints. All questions were answered. Pt tolerated treatment.    Problem: Patient Care Overview  Goal: Plan of Care Review  Outcome: Outcome(s) achieved Date Met: 03/29/18  No complaints.

## 2018-04-05 ENCOUNTER — INFUSION (OUTPATIENT)
Dept: INFUSION THERAPY | Facility: HOSPITAL | Age: 63
End: 2018-04-05
Attending: INTERNAL MEDICINE
Payer: COMMERCIAL

## 2018-04-05 ENCOUNTER — OFFICE VISIT (OUTPATIENT)
Dept: HEMATOLOGY/ONCOLOGY | Facility: CLINIC | Age: 63
End: 2018-04-05
Payer: COMMERCIAL

## 2018-04-05 VITALS
BODY MASS INDEX: 32.45 KG/M2 | HEIGHT: 64 IN | HEART RATE: 83 BPM | DIASTOLIC BLOOD PRESSURE: 69 MMHG | RESPIRATION RATE: 20 BRPM | TEMPERATURE: 98 F | SYSTOLIC BLOOD PRESSURE: 110 MMHG | WEIGHT: 190.06 LBS

## 2018-04-05 VITALS
SYSTOLIC BLOOD PRESSURE: 128 MMHG | HEIGHT: 64 IN | RESPIRATION RATE: 20 BRPM | BODY MASS INDEX: 32.45 KG/M2 | DIASTOLIC BLOOD PRESSURE: 66 MMHG | TEMPERATURE: 98 F | HEART RATE: 97 BPM | WEIGHT: 190.06 LBS

## 2018-04-05 DIAGNOSIS — E87.6 HYPOKALEMIA: ICD-10-CM

## 2018-04-05 DIAGNOSIS — D64.81 ANTINEOPLASTIC CHEMOTHERAPY INDUCED ANEMIA: ICD-10-CM

## 2018-04-05 DIAGNOSIS — Z17.0 MALIGNANT NEOPLASM OF OVERLAPPING SITES OF BOTH BREASTS IN FEMALE, ESTROGEN RECEPTOR POSITIVE: ICD-10-CM

## 2018-04-05 DIAGNOSIS — C50.811 MALIGNANT NEOPLASM OF OVERLAPPING SITES OF BOTH BREASTS IN FEMALE, ESTROGEN RECEPTOR POSITIVE: ICD-10-CM

## 2018-04-05 DIAGNOSIS — C50.911 BILATERAL MALIGNANT NEOPLASM OF BREAST IN FEMALE, ESTROGEN RECEPTOR POSITIVE, UNSPECIFIED SITE OF BREAST: Primary | ICD-10-CM

## 2018-04-05 DIAGNOSIS — D70.1 CHEMOTHERAPY INDUCED NEUTROPENIA: Primary | ICD-10-CM

## 2018-04-05 DIAGNOSIS — T45.1X5A CHEMOTHERAPY INDUCED NEUTROPENIA: Primary | ICD-10-CM

## 2018-04-05 DIAGNOSIS — Z17.0 BILATERAL MALIGNANT NEOPLASM OF BREAST IN FEMALE, ESTROGEN RECEPTOR POSITIVE, UNSPECIFIED SITE OF BREAST: Primary | ICD-10-CM

## 2018-04-05 DIAGNOSIS — T45.1X5A ANTINEOPLASTIC CHEMOTHERAPY INDUCED ANEMIA: ICD-10-CM

## 2018-04-05 DIAGNOSIS — C50.912 BILATERAL MALIGNANT NEOPLASM OF BREAST IN FEMALE, ESTROGEN RECEPTOR POSITIVE, UNSPECIFIED SITE OF BREAST: Primary | ICD-10-CM

## 2018-04-05 DIAGNOSIS — C50.812 MALIGNANT NEOPLASM OF OVERLAPPING SITES OF BOTH BREASTS IN FEMALE, ESTROGEN RECEPTOR POSITIVE: ICD-10-CM

## 2018-04-05 PROCEDURE — 96375 TX/PRO/DX INJ NEW DRUG ADDON: CPT | Mod: PN

## 2018-04-05 PROCEDURE — 96368 THER/DIAG CONCURRENT INF: CPT | Mod: PN

## 2018-04-05 PROCEDURE — A4216 STERILE WATER/SALINE, 10 ML: HCPCS | Mod: PN | Performed by: NURSE PRACTITIONER

## 2018-04-05 PROCEDURE — 63600175 PHARM REV CODE 636 W HCPCS: Mod: PN | Performed by: INTERNAL MEDICINE

## 2018-04-05 PROCEDURE — 99999 PR PBB SHADOW E&M-EST. PATIENT-LVL IV: CPT | Mod: PBBFAC,,, | Performed by: NURSE PRACTITIONER

## 2018-04-05 PROCEDURE — 63600175 PHARM REV CODE 636 W HCPCS: Mod: PN | Performed by: NURSE PRACTITIONER

## 2018-04-05 PROCEDURE — 96372 THER/PROPH/DIAG INJ SC/IM: CPT | Mod: PN

## 2018-04-05 PROCEDURE — 25000003 PHARM REV CODE 250: Mod: PN | Performed by: NURSE PRACTITIONER

## 2018-04-05 PROCEDURE — 99214 OFFICE O/P EST MOD 30 MIN: CPT | Mod: S$GLB,,, | Performed by: NURSE PRACTITIONER

## 2018-04-05 PROCEDURE — S0028 INJECTION, FAMOTIDINE, 20 MG: HCPCS | Mod: PN | Performed by: NURSE PRACTITIONER

## 2018-04-05 PROCEDURE — 96413 CHEMO IV INFUSION 1 HR: CPT | Mod: PN

## 2018-04-05 PROCEDURE — 96367 TX/PROPH/DG ADDL SEQ IV INF: CPT | Mod: PN

## 2018-04-05 PROCEDURE — 25000003 PHARM REV CODE 250: Mod: PN | Performed by: INTERNAL MEDICINE

## 2018-04-05 RX ORDER — POTASSIUM CHLORIDE 20 MEQ/1
20 TABLET, EXTENDED RELEASE ORAL 2 TIMES DAILY
Qty: 30 TABLET | Refills: 11 | Status: SHIPPED | OUTPATIENT
Start: 2018-04-05 | End: 2019-04-05

## 2018-04-05 RX ORDER — EPINEPHRINE 0.3 MG/.3ML
0.3 INJECTION SUBCUTANEOUS ONCE AS NEEDED
Status: CANCELLED | OUTPATIENT
Start: 2018-04-05 | End: 2018-04-05

## 2018-04-05 RX ORDER — FAMOTIDINE 10 MG/ML
20 INJECTION INTRAVENOUS
Status: COMPLETED | OUTPATIENT
Start: 2018-04-05 | End: 2018-04-05

## 2018-04-05 RX ORDER — DIPHENHYDRAMINE HYDROCHLORIDE 50 MG/ML
25 INJECTION INTRAMUSCULAR; INTRAVENOUS
Status: COMPLETED | OUTPATIENT
Start: 2018-04-05 | End: 2018-04-05

## 2018-04-05 RX ORDER — EPINEPHRINE 0.3 MG/.3ML
0.3 INJECTION SUBCUTANEOUS ONCE AS NEEDED
Status: DISCONTINUED | OUTPATIENT
Start: 2018-04-05 | End: 2018-04-05 | Stop reason: HOSPADM

## 2018-04-05 RX ORDER — DIPHENHYDRAMINE HYDROCHLORIDE 50 MG/ML
50 INJECTION INTRAMUSCULAR; INTRAVENOUS ONCE AS NEEDED
Status: DISCONTINUED | OUTPATIENT
Start: 2018-04-05 | End: 2018-04-05 | Stop reason: HOSPADM

## 2018-04-05 RX ORDER — DIPHENHYDRAMINE HYDROCHLORIDE 50 MG/ML
25 INJECTION INTRAMUSCULAR; INTRAVENOUS
Status: CANCELLED
Start: 2018-04-05 | End: 2018-04-05

## 2018-04-05 RX ORDER — HEPARIN 100 UNIT/ML
500 SYRINGE INTRAVENOUS
Status: DISCONTINUED | OUTPATIENT
Start: 2018-04-05 | End: 2018-04-05 | Stop reason: HOSPADM

## 2018-04-05 RX ORDER — MULTIVIT WITH MINERALS/HERBS
1 TABLET ORAL DAILY
COMMUNITY

## 2018-04-05 RX ORDER — DIPHENHYDRAMINE HYDROCHLORIDE 50 MG/ML
50 INJECTION INTRAMUSCULAR; INTRAVENOUS ONCE AS NEEDED
Status: CANCELLED | OUTPATIENT
Start: 2018-04-05 | End: 2018-04-05

## 2018-04-05 RX ORDER — HEPARIN 100 UNIT/ML
500 SYRINGE INTRAVENOUS
Status: CANCELLED | OUTPATIENT
Start: 2018-04-05

## 2018-04-05 RX ORDER — SODIUM CHLORIDE 0.9 % (FLUSH) 0.9 %
10 SYRINGE (ML) INJECTION
Status: DISCONTINUED | OUTPATIENT
Start: 2018-04-05 | End: 2018-04-05 | Stop reason: HOSPADM

## 2018-04-05 RX ORDER — POTASSIUM CHLORIDE 14.9 MG/ML
20 INJECTION INTRAVENOUS ONCE
Status: DISCONTINUED | OUTPATIENT
Start: 2018-04-05 | End: 2018-04-05 | Stop reason: SDUPTHER

## 2018-04-05 RX ORDER — POTASSIUM CHLORIDE 14.9 MG/ML
20 INJECTION INTRAVENOUS ONCE
Status: CANCELLED
Start: 2018-04-05 | End: 2018-04-05

## 2018-04-05 RX ORDER — SODIUM CHLORIDE 0.9 % (FLUSH) 0.9 %
10 SYRINGE (ML) INJECTION
Status: CANCELLED | OUTPATIENT
Start: 2018-04-05

## 2018-04-05 RX ORDER — FAMOTIDINE 10 MG/ML
20 INJECTION INTRAVENOUS
Status: CANCELLED | OUTPATIENT
Start: 2018-04-05

## 2018-04-05 RX ADMIN — SODIUM CHLORIDE: 9 INJECTION, SOLUTION INTRAVENOUS at 02:04

## 2018-04-05 RX ADMIN — PACLITAXEL 156 MG: 6 INJECTION, SOLUTION, CONCENTRATE INTRAVENOUS at 03:04

## 2018-04-05 RX ADMIN — DIPHENHYDRAMINE HYDROCHLORIDE 25 MG: 50 INJECTION, SOLUTION INTRAMUSCULAR; INTRAVENOUS at 02:04

## 2018-04-05 RX ADMIN — SODIUM CHLORIDE, PRESERVATIVE FREE 10 ML: 5 INJECTION INTRAVENOUS at 02:04

## 2018-04-05 RX ADMIN — FAMOTIDINE 20 MG: 10 INJECTION INTRAVENOUS at 02:04

## 2018-04-05 RX ADMIN — DEXAMETHASONE SODIUM PHOSPHATE 10 MG: 4 INJECTION, SOLUTION INTRA-ARTICULAR; INTRALESIONAL; INTRAMUSCULAR; INTRAVENOUS; SOFT TISSUE at 02:04

## 2018-04-05 RX ADMIN — ERYTHROPOIETIN 40000 UNITS: 40000 INJECTION, SOLUTION INTRAVENOUS; SUBCUTANEOUS at 02:04

## 2018-04-05 RX ADMIN — SODIUM CHLORIDE: 0.9 INJECTION, SOLUTION INTRAVENOUS at 02:04

## 2018-04-05 NOTE — PROGRESS NOTES
HISTORY OF PRESENT ILLNESS:  This is a 62 y/o  female known to   Dr. Mcintosh for newly diagnosed breast cancer involving both breasts.  The patient   was found to have invasive ductal carcinoma 1.4 cm with low-grade ductal carcinoma   in situ on the right side. Pathology reveals three sentinel lymph nodes on the right side, one   of which showed micrometastases size of 0.8 mm without extranodal extension.    The left breast shows no evidence of lymph node metastases on sentinel lymph node   evaluation, focal low-grade LCIS and DCIS, but no invasive component.  The   patient's invasive ductal carcinoma total size is about 2.3 cm.  She stages as   a T2 N1 MX.  Invasive ductal carcinoma showing ER positivity, MT positivity   and HER-2 negativity by FISH.  She has undergone bilateral mastectomies with   Dr. Cardenas.  She has completed 4 cycles of AC and presents to the clinic today   for evaluation prior to Week #5 of planned 12 infusions of Taxol.  She feels a little   fatigued, but much better than last week with Procrit given.  No other new complaints   or pertinent findings on a 14 point review of systems.    PHYSICAL EXAMINATION:   GENERAL:  WDWNWF in NAD, Alert & oriented x 3.  VITAL SIGNS:  Weight:  Stable.  Wt Readings from Last 3 Encounters:   04/05/18 86.2 kg (190 lb 0.6 oz)   03/29/18 86.2 kg (190 lb 0.6 oz)   03/29/18 86.2 kg (190 lb 0.6 oz)     Temp Readings from Last 3 Encounters:   04/05/18 98.3 °F (36.8 °C)   03/29/18 99.1 °F (37.3 °C)   03/29/18 99.1 °F (37.3 °C)     BP Readings from Last 3 Encounters:   04/05/18 128/66   03/29/18 135/81   03/29/18 (!) 148/76     Pulse Readings from Last 3 Encounters:   04/05/18 97   03/29/18 87   03/29/18 96     LUNGS:  Clear to auscultation.  No JVD.  Trachea is central.  NL respiratory effort.  CVS:  S1, S2 normally heard.  No murmurs or gallops.  ABDOMEN:  Soft.  NT/ND, +BS X 4.  EXTREMITIES:  No CCE, distal pulses present.  NEUROLOGICAL:  The patient is  appropriate.    LABS:  Labs from   Lab Results   Component Value Date    WBC 4.54 04/05/2018    HGB 9.9 (L) 04/05/2018    HCT 30.0 (L) 04/05/2018    MCV 97 04/05/2018     04/05/2018     Differential remarkable for 84% grans, 10% lymph, 3% monos  CMP  Sodium   Date Value Ref Range Status   04/05/2018 142 136 - 145 mmol/L Final     Potassium   Date Value Ref Range Status   04/05/2018 3.1 (L) 3.5 - 5.1 mmol/L Final     Chloride   Date Value Ref Range Status   04/05/2018 99 95 - 110 mmol/L Final     CO2   Date Value Ref Range Status   04/05/2018 32 (H) 22 - 31 mmol/L Final     Glucose   Date Value Ref Range Status   04/05/2018 126 (H) 70 - 110 mg/dL Final     Comment:     The ADA recommends the following guidelines for fasting glucose:  Normal:       less than 100 mg/dL  Prediabetes:  100 mg/dL to 125 mg/dL  Diabetes:     126 mg/dL or higher       BUN, Bld   Date Value Ref Range Status   04/05/2018 14 7 - 18 mg/dL Final     Creatinine   Date Value Ref Range Status   04/05/2018 0.62 0.50 - 1.40 mg/dL Final     Calcium   Date Value Ref Range Status   04/05/2018 9.1 8.4 - 10.2 mg/dL Final     Total Protein   Date Value Ref Range Status   04/05/2018 6.5 6.0 - 8.4 g/dL Final     Albumin   Date Value Ref Range Status   04/05/2018 3.9 3.5 - 5.2 g/dL Final     Total Bilirubin   Date Value Ref Range Status   04/05/2018 0.8 0.2 - 1.3 mg/dL Final     Alkaline Phosphatase   Date Value Ref Range Status   04/05/2018 78 38 - 145 U/L Final     AST ([place])   Date Value Ref Range Status   01/18/2016 22 14 - 36 U/L Final     AST   Date Value Ref Range Status   04/05/2018 23 14 - 36 U/L Final     ALT   Date Value Ref Range Status   04/05/2018 30 10 - 44 U/L Final     Anion Gap   Date Value Ref Range Status   04/05/2018 11 8 - 16 mmol/L Final     eGFR if    Date Value Ref Range Status   04/05/2018 >60 >60 mL/min/1.73 m^2 Final     eGFR if non    Date Value Ref Range Status   04/05/2018 >60 >60  mL/min/1.73 m^2 Final     Comment:     Calculation used to obtain the estimated glomerular filtration  rate (eGFR) is the CKD-EPI equation.        IMPRESSION:    1.  T2 N1 MX breast cancer with DCIS on left side and invasive component on the right side.  ER/NC positive, HER-2 negative.  2.  Hypokalemia - compliant with potassium at 20 meq bid; K-rider 20 meq today  3.  Chemotherapy associated anemia - repeat Procrit 40,000 units SQ today    PLAN:    1.  Proceed with Week #5 of Taxol dosed at 80 mg/m2 (156 mg) with premedications of Benadryl, Dexamethasone and Pepcid.  2.  Return to clinic in 1 week with interval CBC, CMP, MG for evaluation prior to Week #6/12 of Taxol.  3.  Procrit 40,000 units SQ today for palliation of anemia.    To Note:  Following 12 weeks of Taxol, patient will embark upon XRT + Arimidex.     Assessment/plan reviewed and approved by Dr. Vuong.

## 2018-04-05 NOTE — PLAN OF CARE
Problem: Patient Care Overview  Goal: Plan of Care Review  Outcome: Ongoing (interventions implemented as appropriate)  Tolerated taxol infusion without any c/o.  RTC next Thursday for next tx.  Amb off unit with independent gait.

## 2018-04-05 NOTE — PLAN OF CARE
Problem: Patient Care Overview  Goal: Individualization & Mutuality  Outcome: Ongoing (interventions implemented as appropriate)   04/05/18 1531   Individualization   Patient Specific Preferences afternoon appts due to work schedule   Patient Specific Goals control of disease   Patient Specific Interventions admin meds as ordered

## 2018-04-12 ENCOUNTER — OFFICE VISIT (OUTPATIENT)
Dept: HEMATOLOGY/ONCOLOGY | Facility: CLINIC | Age: 63
End: 2018-04-12
Payer: COMMERCIAL

## 2018-04-12 ENCOUNTER — INFUSION (OUTPATIENT)
Dept: INFUSION THERAPY | Facility: HOSPITAL | Age: 63
End: 2018-04-12
Attending: NURSE PRACTITIONER
Payer: COMMERCIAL

## 2018-04-12 VITALS
HEIGHT: 64 IN | DIASTOLIC BLOOD PRESSURE: 65 MMHG | RESPIRATION RATE: 20 BRPM | TEMPERATURE: 99 F | BODY MASS INDEX: 32.7 KG/M2 | WEIGHT: 191.56 LBS | HEART RATE: 103 BPM | SYSTOLIC BLOOD PRESSURE: 144 MMHG

## 2018-04-12 VITALS
OXYGEN SATURATION: 99 % | HEART RATE: 90 BPM | BODY MASS INDEX: 32.7 KG/M2 | TEMPERATURE: 99 F | SYSTOLIC BLOOD PRESSURE: 125 MMHG | WEIGHT: 191.56 LBS | DIASTOLIC BLOOD PRESSURE: 77 MMHG | HEIGHT: 64 IN | RESPIRATION RATE: 16 BRPM

## 2018-04-12 DIAGNOSIS — I10 BENIGN ESSENTIAL HTN: ICD-10-CM

## 2018-04-12 DIAGNOSIS — D64.81 ANTINEOPLASTIC CHEMOTHERAPY INDUCED ANEMIA: ICD-10-CM

## 2018-04-12 DIAGNOSIS — C50.811 MALIGNANT NEOPLASM OF OVERLAPPING SITES OF BOTH BREASTS IN FEMALE, ESTROGEN RECEPTOR POSITIVE: ICD-10-CM

## 2018-04-12 DIAGNOSIS — E87.6 HYPOKALEMIA: Primary | ICD-10-CM

## 2018-04-12 DIAGNOSIS — C50.011 MALIGNANT NEOPLASM OF NIPPLE OF RIGHT BREAST IN FEMALE, UNSPECIFIED ESTROGEN RECEPTOR STATUS: Primary | ICD-10-CM

## 2018-04-12 DIAGNOSIS — T45.1X5A CHEMOTHERAPY INDUCED NEUTROPENIA: ICD-10-CM

## 2018-04-12 DIAGNOSIS — D70.1 CHEMOTHERAPY INDUCED NEUTROPENIA: ICD-10-CM

## 2018-04-12 DIAGNOSIS — Z17.0 MALIGNANT NEOPLASM OF OVERLAPPING SITES OF BOTH BREASTS IN FEMALE, ESTROGEN RECEPTOR POSITIVE: ICD-10-CM

## 2018-04-12 DIAGNOSIS — C50.812 MALIGNANT NEOPLASM OF OVERLAPPING SITES OF BOTH BREASTS IN FEMALE, ESTROGEN RECEPTOR POSITIVE: ICD-10-CM

## 2018-04-12 DIAGNOSIS — T45.1X5A ANTINEOPLASTIC CHEMOTHERAPY INDUCED ANEMIA: ICD-10-CM

## 2018-04-12 PROCEDURE — 63600175 PHARM REV CODE 636 W HCPCS: Mod: PN | Performed by: INTERNAL MEDICINE

## 2018-04-12 PROCEDURE — 99215 OFFICE O/P EST HI 40 MIN: CPT | Mod: S$GLB,,, | Performed by: INTERNAL MEDICINE

## 2018-04-12 PROCEDURE — 96372 THER/PROPH/DIAG INJ SC/IM: CPT | Mod: PN

## 2018-04-12 PROCEDURE — A4216 STERILE WATER/SALINE, 10 ML: HCPCS | Mod: PN | Performed by: INTERNAL MEDICINE

## 2018-04-12 PROCEDURE — 25000003 PHARM REV CODE 250: Mod: PN | Performed by: INTERNAL MEDICINE

## 2018-04-12 PROCEDURE — 96375 TX/PRO/DX INJ NEW DRUG ADDON: CPT | Mod: PN

## 2018-04-12 PROCEDURE — 96367 TX/PROPH/DG ADDL SEQ IV INF: CPT | Mod: PN

## 2018-04-12 PROCEDURE — S0028 INJECTION, FAMOTIDINE, 20 MG: HCPCS | Mod: PN | Performed by: INTERNAL MEDICINE

## 2018-04-12 PROCEDURE — 96413 CHEMO IV INFUSION 1 HR: CPT | Mod: PN

## 2018-04-12 PROCEDURE — 99999 PR PBB SHADOW E&M-EST. PATIENT-LVL III: CPT | Mod: PBBFAC,,, | Performed by: INTERNAL MEDICINE

## 2018-04-12 RX ORDER — DIPHENHYDRAMINE HYDROCHLORIDE 50 MG/ML
50 INJECTION INTRAMUSCULAR; INTRAVENOUS ONCE AS NEEDED
Status: CANCELLED | OUTPATIENT
Start: 2018-04-12 | End: 2018-04-12

## 2018-04-12 RX ORDER — SODIUM CHLORIDE 0.9 % (FLUSH) 0.9 %
10 SYRINGE (ML) INJECTION
Status: CANCELLED | OUTPATIENT
Start: 2018-04-12

## 2018-04-12 RX ORDER — DIPHENHYDRAMINE HYDROCHLORIDE 50 MG/ML
25 INJECTION INTRAMUSCULAR; INTRAVENOUS
Status: CANCELLED
Start: 2018-04-12 | End: 2018-04-12

## 2018-04-12 RX ORDER — FAMOTIDINE 10 MG/ML
20 INJECTION INTRAVENOUS
Status: CANCELLED | OUTPATIENT
Start: 2018-04-12

## 2018-04-12 RX ORDER — HEPARIN 100 UNIT/ML
500 SYRINGE INTRAVENOUS
Status: CANCELLED | OUTPATIENT
Start: 2018-04-12

## 2018-04-12 RX ORDER — SODIUM CHLORIDE 0.9 % (FLUSH) 0.9 %
10 SYRINGE (ML) INJECTION
Status: DISCONTINUED | OUTPATIENT
Start: 2018-04-12 | End: 2018-04-12 | Stop reason: HOSPADM

## 2018-04-12 RX ORDER — FAMOTIDINE 10 MG/ML
20 INJECTION INTRAVENOUS
Status: COMPLETED | OUTPATIENT
Start: 2018-04-12 | End: 2018-04-12

## 2018-04-12 RX ORDER — EPINEPHRINE 0.3 MG/.3ML
0.3 INJECTION SUBCUTANEOUS ONCE AS NEEDED
Status: DISCONTINUED | OUTPATIENT
Start: 2018-04-12 | End: 2018-04-12 | Stop reason: HOSPADM

## 2018-04-12 RX ORDER — DIPHENHYDRAMINE HYDROCHLORIDE 50 MG/ML
50 INJECTION INTRAMUSCULAR; INTRAVENOUS ONCE AS NEEDED
Status: DISCONTINUED | OUTPATIENT
Start: 2018-04-12 | End: 2018-04-12 | Stop reason: HOSPADM

## 2018-04-12 RX ORDER — DIPHENHYDRAMINE HYDROCHLORIDE 50 MG/ML
25 INJECTION INTRAMUSCULAR; INTRAVENOUS
Status: COMPLETED | OUTPATIENT
Start: 2018-04-12 | End: 2018-04-12

## 2018-04-12 RX ORDER — EPINEPHRINE 0.3 MG/.3ML
0.3 INJECTION SUBCUTANEOUS ONCE AS NEEDED
Status: CANCELLED | OUTPATIENT
Start: 2018-04-12 | End: 2018-04-12

## 2018-04-12 RX ADMIN — SODIUM CHLORIDE, PRESERVATIVE FREE 10 ML: 5 INJECTION INTRAVENOUS at 02:04

## 2018-04-12 RX ADMIN — ERYTHROPOIETIN 40000 UNITS: 40000 INJECTION, SOLUTION INTRAVENOUS; SUBCUTANEOUS at 04:04

## 2018-04-12 RX ADMIN — FAMOTIDINE 20 MG: 10 INJECTION INTRAVENOUS at 02:04

## 2018-04-12 RX ADMIN — SODIUM CHLORIDE: 0.9 INJECTION, SOLUTION INTRAVENOUS at 02:04

## 2018-04-12 RX ADMIN — DEXAMETHASONE SODIUM PHOSPHATE 10 MG: 4 INJECTION, SOLUTION INTRA-ARTICULAR; INTRALESIONAL; INTRAMUSCULAR; INTRAVENOUS; SOFT TISSUE at 02:04

## 2018-04-12 RX ADMIN — PACLITAXEL 156 MG: 6 INJECTION, SOLUTION INTRAVENOUS at 03:04

## 2018-04-12 RX ADMIN — DIPHENHYDRAMINE HYDROCHLORIDE 25 MG: 50 INJECTION, SOLUTION INTRAMUSCULAR; INTRAVENOUS at 02:04

## 2018-04-12 NOTE — PROGRESS NOTES
Date of Service: 12/22/2017  Ga Cross is 62 years of age  woman  for newly diagnosed breast cancer.start of AC chemotherapy.  The patient had self   examination and found a lump.  She has undergone bilateral mastectomies with Dr. Cardenas.  Pathology reveals three sentinel lymph nodes on the right side, one   of which showed micrometastases size of 0.8 mm without extranodal extension.    The patient shows invasive ductal carcinoma 1.4 cm with low-grade ductal   carcinoma in situ on the right side.  On the left breast, the patient shows no   evidence of lymph node metastases on sentinel lymph node evaluation, focal   low-grade LCIS and DCIS on the left side, but no invasive component.  The   patient's invasive ductal carcinoma total size is about 2.3 cm, status post   mastectomy.  She stages as a T2 N1 MX.  The patient has the invasive ductal   carcinoma showing ER positivity, NC positivity, and HER-2 negativity by FISH.    Has seen xrt in consult, DR. Graham  Completed AC started taxol last week feeling tired now  FAMILY HISTORY:  Noncontributory.  There is heart disease and dyslipidemia,   strokes and asthma.    SOCIAL HISTORY:  Nonsmoker, no recreational drug or alcohol use.  Occasional   social alcohol only.    SURGICAL HISTORY:  Significant for cholecystectomy, tonsillectomy and bilateral   mastectomies now.    MEDICAL HISTORY:  Significant for dyslipidemia, hypertension, mitral valve   prolapse, seasonal allergies.    MEDICATIONS:  Include Norvasc, cyclobenzaprine, HydroDIURIL, ibuprofen.    ALLERGIES:  She is allergic to hydromorphone and penicillin.    PHYSICAL EXAMINATION:   VITAL SIGNS:  Reveals the patient with 185 pound weight.  BSA 1.94, height of 5   feet 4 inches, no pain.  Wt Readings from Last 3 Encounters:   04/12/18 86.9 kg (191 lb 9.3 oz)   04/05/18 86.2 kg (190 lb 0.6 oz)   04/05/18 86.2 kg (190 lb 0.6 oz)     Temp Readings from Last 3 Encounters:   04/12/18 98.7 °F (37.1 °C)    04/05/18 98.3 °F (36.8 °C)   04/05/18 98.3 °F (36.8 °C)     BP Readings from Last 3 Encounters:   04/12/18 (!) 144/65   04/05/18 110/69   04/05/18 128/66     Pulse Readings from Last 3 Encounters:   04/12/18 103   04/05/18 83   04/05/18 97   GENERAL:  Awake, alert, oriented.  BREASTS:  Double mastectomy is healing well.  LUNGS:  Clear to auscultation.  No JVD.  Trachea is central.  CVS:  S1, S2 normally heard.  No murmurs or gallops.  ABDOMEN:  Soft.  No rebound, guarding or rigidity.  EXTREMITIES:  Without edema.  NEUROLOGICAL:  The patient is appropriate.    LABS:  Labs from   Lab Results   Component Value Date    WBC 3.77 (L) 04/12/2018    HGB 9.3 (L) 04/12/2018    HCT 28.9 (L) 04/12/2018     (H) 04/12/2018     04/12/2018     CMP  Sodium   Date Value Ref Range Status   04/12/2018 142 136 - 145 mmol/L Final     Potassium   Date Value Ref Range Status   04/12/2018 3.2 (L) 3.5 - 5.1 mmol/L Final     Chloride   Date Value Ref Range Status   04/12/2018 99 95 - 110 mmol/L Final     CO2   Date Value Ref Range Status   04/12/2018 32 (H) 22 - 31 mmol/L Final     Glucose   Date Value Ref Range Status   04/12/2018 145 (H) 70 - 110 mg/dL Final     Comment:     The ADA recommends the following guidelines for fasting glucose:  Normal:       less than 100 mg/dL  Prediabetes:  100 mg/dL to 125 mg/dL  Diabetes:     126 mg/dL or higher       BUN, Bld   Date Value Ref Range Status   04/12/2018 17 7 - 18 mg/dL Final     Creatinine   Date Value Ref Range Status   04/12/2018 0.66 0.50 - 1.40 mg/dL Final     Calcium   Date Value Ref Range Status   04/12/2018 9.9 8.4 - 10.2 mg/dL Final     Total Protein   Date Value Ref Range Status   04/12/2018 6.2 6.0 - 8.4 g/dL Final     Albumin   Date Value Ref Range Status   04/12/2018 3.9 3.5 - 5.2 g/dL Final     Total Bilirubin   Date Value Ref Range Status   04/12/2018 0.7 0.2 - 1.3 mg/dL Final     Alkaline Phosphatase   Date Value Ref Range Status   04/12/2018 79 38 - 145 U/L  Final     AST ([place])   Date Value Ref Range Status   01/18/2016 22 14 - 36 U/L Final     AST   Date Value Ref Range Status   04/12/2018 22 14 - 36 U/L Final     ALT   Date Value Ref Range Status   04/12/2018 31 10 - 44 U/L Final     Anion Gap   Date Value Ref Range Status   04/12/2018 11 8 - 16 mmol/L Final     eGFR if    Date Value Ref Range Status   04/12/2018 >60 >60 mL/min/1.73 m^2 Final     eGFR if non    Date Value Ref Range Status   04/12/2018 >60 >60 mL/min/1.73 m^2 Final     Comment:     Calculation used to obtain the estimated glomerular filtration  rate (eGFR) is the CKD-EPI equation.      PATHOLOGY:  As mentioned above.    IMPRESSION:  T2 N1 MX breast cancer with DCIS on left side and invasive   component on the right side.  ER/[place] positive, HER-2 negative.  PET done , neg for mets  PLAN:       continue with taxol       rtc 1weeks with cbc, cmp for cycle 7 /12 then xrt+arimidex   add procrit weekly

## 2018-04-12 NOTE — PLAN OF CARE
Problem: Oncology Care (Adult)  Goal: Signs and Symptoms of Listed Potential Problems Will be Absent, Minimized or Managed (Oncology Care)  Signs and symptoms of listed potential problems will be absent, minimized or managed by discharge/transition of care (reference Oncology Care (Adult) [facility]).     Outcome: Outcome(s) achieved Date Met: 04/12/18  Pt tolerated treatment. No complaints. All questions were answered.    Problem: Patient Care Overview  Goal: Plan of Care Review  Outcome: Outcome(s) achieved Date Met: 04/12/18  No complaints.

## 2018-04-19 ENCOUNTER — OFFICE VISIT (OUTPATIENT)
Dept: HEMATOLOGY/ONCOLOGY | Facility: CLINIC | Age: 63
End: 2018-04-19
Payer: COMMERCIAL

## 2018-04-19 ENCOUNTER — INFUSION (OUTPATIENT)
Dept: INFUSION THERAPY | Facility: HOSPITAL | Age: 63
End: 2018-04-19
Attending: INTERNAL MEDICINE
Payer: COMMERCIAL

## 2018-04-19 VITALS
DIASTOLIC BLOOD PRESSURE: 77 MMHG | RESPIRATION RATE: 20 BRPM | TEMPERATURE: 99 F | HEIGHT: 64 IN | SYSTOLIC BLOOD PRESSURE: 122 MMHG | BODY MASS INDEX: 32.48 KG/M2 | WEIGHT: 190.25 LBS | HEART RATE: 92 BPM

## 2018-04-19 VITALS
BODY MASS INDEX: 32.48 KG/M2 | TEMPERATURE: 99 F | DIASTOLIC BLOOD PRESSURE: 69 MMHG | RESPIRATION RATE: 20 BRPM | SYSTOLIC BLOOD PRESSURE: 137 MMHG | HEIGHT: 64 IN | WEIGHT: 190.25 LBS | HEART RATE: 97 BPM

## 2018-04-19 DIAGNOSIS — E87.6 HYPOKALEMIA: Primary | ICD-10-CM

## 2018-04-19 DIAGNOSIS — Z17.0 MALIGNANT NEOPLASM OF OVERLAPPING SITES OF BOTH BREASTS IN FEMALE, ESTROGEN RECEPTOR POSITIVE: ICD-10-CM

## 2018-04-19 DIAGNOSIS — T45.1X5A ANTINEOPLASTIC CHEMOTHERAPY INDUCED ANEMIA: ICD-10-CM

## 2018-04-19 DIAGNOSIS — C50.811 MALIGNANT NEOPLASM OF OVERLAPPING SITES OF BOTH BREASTS IN FEMALE, ESTROGEN RECEPTOR POSITIVE: ICD-10-CM

## 2018-04-19 DIAGNOSIS — C50.812 MALIGNANT NEOPLASM OF OVERLAPPING SITES OF BOTH BREASTS IN FEMALE, ESTROGEN RECEPTOR POSITIVE: ICD-10-CM

## 2018-04-19 DIAGNOSIS — C50.812 MALIGNANT NEOPLASM OF OVERLAPPING SITES OF BOTH BREASTS IN FEMALE, ESTROGEN RECEPTOR POSITIVE: Primary | ICD-10-CM

## 2018-04-19 DIAGNOSIS — D64.81 ANTINEOPLASTIC CHEMOTHERAPY INDUCED ANEMIA: ICD-10-CM

## 2018-04-19 DIAGNOSIS — C50.811 MALIGNANT NEOPLASM OF OVERLAPPING SITES OF BOTH BREASTS IN FEMALE, ESTROGEN RECEPTOR POSITIVE: Primary | ICD-10-CM

## 2018-04-19 DIAGNOSIS — T45.1X5A CHEMOTHERAPY INDUCED NEUTROPENIA: ICD-10-CM

## 2018-04-19 DIAGNOSIS — Z17.0 MALIGNANT NEOPLASM OF OVERLAPPING SITES OF BOTH BREASTS IN FEMALE, ESTROGEN RECEPTOR POSITIVE: Primary | ICD-10-CM

## 2018-04-19 DIAGNOSIS — D70.1 CHEMOTHERAPY INDUCED NEUTROPENIA: ICD-10-CM

## 2018-04-19 DIAGNOSIS — E78.00 PURE HYPERCHOLESTEROLEMIA: ICD-10-CM

## 2018-04-19 PROCEDURE — 96367 TX/PROPH/DG ADDL SEQ IV INF: CPT | Mod: PN

## 2018-04-19 PROCEDURE — 96375 TX/PRO/DX INJ NEW DRUG ADDON: CPT | Mod: PN

## 2018-04-19 PROCEDURE — 63600175 PHARM REV CODE 636 W HCPCS: Mod: PN | Performed by: INTERNAL MEDICINE

## 2018-04-19 PROCEDURE — 96372 THER/PROPH/DIAG INJ SC/IM: CPT | Mod: PN

## 2018-04-19 PROCEDURE — 99999 PR PBB SHADOW E&M-EST. PATIENT-LVL III: CPT | Mod: PBBFAC,,, | Performed by: INTERNAL MEDICINE

## 2018-04-19 PROCEDURE — 99215 OFFICE O/P EST HI 40 MIN: CPT | Mod: S$GLB,,, | Performed by: INTERNAL MEDICINE

## 2018-04-19 PROCEDURE — S0028 INJECTION, FAMOTIDINE, 20 MG: HCPCS | Mod: PN | Performed by: INTERNAL MEDICINE

## 2018-04-19 PROCEDURE — 96413 CHEMO IV INFUSION 1 HR: CPT | Mod: PN

## 2018-04-19 PROCEDURE — 25000003 PHARM REV CODE 250: Mod: PN | Performed by: INTERNAL MEDICINE

## 2018-04-19 PROCEDURE — A4216 STERILE WATER/SALINE, 10 ML: HCPCS | Mod: PN | Performed by: INTERNAL MEDICINE

## 2018-04-19 RX ORDER — FAMOTIDINE 20 MG/50ML
20 INJECTION, SOLUTION INTRAVENOUS
Status: COMPLETED | OUTPATIENT
Start: 2018-04-19 | End: 2018-04-19

## 2018-04-19 RX ORDER — DIPHENHYDRAMINE HYDROCHLORIDE 50 MG/ML
25 INJECTION INTRAMUSCULAR; INTRAVENOUS
Status: COMPLETED | OUTPATIENT
Start: 2018-04-19 | End: 2018-04-19

## 2018-04-19 RX ORDER — SODIUM CHLORIDE 0.9 % (FLUSH) 0.9 %
10 SYRINGE (ML) INJECTION
Status: CANCELLED | OUTPATIENT
Start: 2018-04-19

## 2018-04-19 RX ORDER — FAMOTIDINE 10 MG/ML
20 INJECTION INTRAVENOUS
Status: CANCELLED | OUTPATIENT
Start: 2018-04-19

## 2018-04-19 RX ORDER — DIPHENHYDRAMINE HYDROCHLORIDE 50 MG/ML
50 INJECTION INTRAMUSCULAR; INTRAVENOUS ONCE AS NEEDED
Status: CANCELLED | OUTPATIENT
Start: 2018-04-19 | End: 2018-04-19

## 2018-04-19 RX ORDER — DIPHENHYDRAMINE HYDROCHLORIDE 50 MG/ML
25 INJECTION INTRAMUSCULAR; INTRAVENOUS
Status: CANCELLED
Start: 2018-04-19 | End: 2018-04-19

## 2018-04-19 RX ORDER — HEPARIN 100 UNIT/ML
500 SYRINGE INTRAVENOUS
Status: CANCELLED | OUTPATIENT
Start: 2018-04-19

## 2018-04-19 RX ORDER — SODIUM CHLORIDE 0.9 % (FLUSH) 0.9 %
10 SYRINGE (ML) INJECTION
Status: DISCONTINUED | OUTPATIENT
Start: 2018-04-19 | End: 2018-04-19 | Stop reason: HOSPADM

## 2018-04-19 RX ORDER — EPINEPHRINE 0.3 MG/.3ML
0.3 INJECTION SUBCUTANEOUS ONCE AS NEEDED
Status: CANCELLED | OUTPATIENT
Start: 2018-04-19 | End: 2018-04-19

## 2018-04-19 RX ADMIN — SODIUM CHLORIDE, PRESERVATIVE FREE 10 ML: 5 INJECTION INTRAVENOUS at 01:04

## 2018-04-19 RX ADMIN — DIPHENHYDRAMINE HYDROCHLORIDE 25 MG: 50 INJECTION INTRAMUSCULAR; INTRAVENOUS at 01:04

## 2018-04-19 RX ADMIN — ERYTHROPOIETIN 40000 UNITS: 40000 INJECTION, SOLUTION INTRAVENOUS; SUBCUTANEOUS at 01:04

## 2018-04-19 RX ADMIN — DEXAMETHASONE SODIUM PHOSPHATE 10 MG: 4 INJECTION, SOLUTION INTRA-ARTICULAR; INTRALESIONAL; INTRAMUSCULAR; INTRAVENOUS; SOFT TISSUE at 01:04

## 2018-04-19 RX ADMIN — FAMOTIDINE 20 MG: 20 INJECTION, SOLUTION INTRAVENOUS at 01:04

## 2018-04-19 RX ADMIN — SODIUM CHLORIDE: 0.9 INJECTION, SOLUTION INTRAVENOUS at 01:04

## 2018-04-19 RX ADMIN — PACLITAXEL 156 MG: 6 INJECTION, SOLUTION INTRAVENOUS at 02:04

## 2018-04-19 NOTE — PROGRESS NOTES
Date of Service: 12/22/2017  Ga Cross is 62 years of age  woman  for newly diagnosed breast cancer.start of AC chemotherapy.  The patient had self   examination and found a lump.  She has undergone bilateral mastectomies with Dr. Cardenas.  Pathology reveals three sentinel lymph nodes on the right side, one   of which showed micrometastases size of 0.8 mm without extranodal extension.    The patient shows invasive ductal carcinoma 1.4 cm with low-grade ductal   carcinoma in situ on the right side.  On the left breast, the patient shows no   evidence of lymph node metastases on sentinel lymph node evaluation, focal   low-grade LCIS and DCIS on the left side, but no invasive component.  The   patient's invasive ductal carcinoma total size is about 2.3 cm, status post   mastectomy.  She stages as a T2 N1 MX.  The patient has the invasive ductal   carcinoma showing ER positivity, NM positivity, and HER-2 negativity by FISH.    Has seen xrt in consult, DR. Graham  Completed AC started taxol last week feeling tired now  FAMILY HISTORY:  Noncontributory.  There is heart disease and dyslipidemia,   strokes and asthma.    SOCIAL HISTORY:  Nonsmoker, no recreational drug or alcohol use.  Occasional   social alcohol only.    SURGICAL HISTORY:  Significant for cholecystectomy, tonsillectomy and bilateral   mastectomies now.    MEDICAL HISTORY:  Significant for dyslipidemia, hypertension, mitral valve   prolapse, seasonal allergies.    MEDICATIONS:  Include Norvasc, cyclobenzaprine, HydroDIURIL, ibuprofen.    ALLERGIES:  She is allergic to hydromorphone and penicillin.    PHYSICAL EXAMINATION:   VITAL SIGNS:  Reveals the patient with 185 pound weight.  BSA 1.94, height of 5   feet 4 inches, no pain.  Wt Readings from Last 3 Encounters:   04/19/18 86.3 kg (190 lb 4.1 oz)   04/12/18 86.9 kg (191 lb 9.3 oz)   04/12/18 86.9 kg (191 lb 9.3 oz)     Temp Readings from Last 3 Encounters:   04/19/18 98.6 °F (37 °C)    04/12/18 98.7 °F (37.1 °C)   04/12/18 98.7 °F (37.1 °C)     BP Readings from Last 3 Encounters:   04/19/18 137/69   04/12/18 125/77   04/12/18 (!) 144/65     Pulse Readings from Last 3 Encounters:   04/19/18 97   04/12/18 90   04/12/18 103   GENERAL:  Awake, alert, oriented.  BREASTS:  Double mastectomy is healing well.  LUNGS:  Clear to auscultation.  No JVD.  Trachea is central.  CVS:  S1, S2 normally heard.  No murmurs or gallops.  ABDOMEN:  Soft.  No rebound, guarding or rigidity.  EXTREMITIES:  Without edema.  NEUROLOGICAL:  The patient is appropriate.    LABS:  Labs from   Lab Results   Component Value Date    WBC 3.96 04/19/2018    HGB 9.4 (L) 04/19/2018    HCT 28.8 (L) 04/19/2018     (H) 04/19/2018     04/19/2018     CMP  Sodium   Date Value Ref Range Status   04/19/2018 140 136 - 145 mmol/L Final     Potassium   Date Value Ref Range Status   04/19/2018 3.6 3.5 - 5.1 mmol/L Final     Chloride   Date Value Ref Range Status   04/19/2018 99 95 - 110 mmol/L Final     CO2   Date Value Ref Range Status   04/19/2018 31 22 - 31 mmol/L Final     Glucose   Date Value Ref Range Status   04/19/2018 130 (H) 70 - 110 mg/dL Final     Comment:     The ADA recommends the following guidelines for fasting glucose:  Normal:       less than 100 mg/dL  Prediabetes:  100 mg/dL to 125 mg/dL  Diabetes:     126 mg/dL or higher       BUN, Bld   Date Value Ref Range Status   04/19/2018 13 7 - 18 mg/dL Final     Creatinine   Date Value Ref Range Status   04/19/2018 0.60 0.50 - 1.40 mg/dL Final     Calcium   Date Value Ref Range Status   04/19/2018 9.6 8.4 - 10.2 mg/dL Final     Total Protein   Date Value Ref Range Status   04/19/2018 5.9 (L) 6.0 - 8.4 g/dL Final     Albumin   Date Value Ref Range Status   04/19/2018 3.7 3.5 - 5.2 g/dL Final     Total Bilirubin   Date Value Ref Range Status   04/19/2018 0.8 0.2 - 1.3 mg/dL Final     Alkaline Phosphatase   Date Value Ref Range Status   04/19/2018 78 38 - 145 U/L Final      AST ([place])   Date Value Ref Range Status   01/18/2016 22 14 - 36 U/L Final     AST   Date Value Ref Range Status   04/19/2018 24 14 - 36 U/L Final     ALT   Date Value Ref Range Status   04/19/2018 29 10 - 44 U/L Final     Anion Gap   Date Value Ref Range Status   04/19/2018 10 8 - 16 mmol/L Final     eGFR if    Date Value Ref Range Status   04/19/2018 >60 >60 mL/min/1.73 m^2 Final     eGFR if non    Date Value Ref Range Status   04/19/2018 >60 >60 mL/min/1.73 m^2 Final     Comment:     Calculation used to obtain the estimated glomerular filtration  rate (eGFR) is the CKD-EPI equation.      PATHOLOGY:  As mentioned above.    IMPRESSION:  T2 N1 MX breast cancer with DCIS on left side and invasive   component on the right side.  ER/MT positive, HER-2 negative.  PET done , neg for mets  PLAN:       continue with taxol procrit for anemia from chemo weekly       rtc 1weeks with cbc, cmp for cycle 7 /12 then xrt+arimidex   add procrit weekly

## 2018-04-26 ENCOUNTER — OFFICE VISIT (OUTPATIENT)
Dept: HEMATOLOGY/ONCOLOGY | Facility: CLINIC | Age: 63
End: 2018-04-26
Payer: COMMERCIAL

## 2018-04-26 ENCOUNTER — INFUSION (OUTPATIENT)
Dept: INFUSION THERAPY | Facility: HOSPITAL | Age: 63
End: 2018-04-26
Attending: INTERNAL MEDICINE
Payer: COMMERCIAL

## 2018-04-26 VITALS — SYSTOLIC BLOOD PRESSURE: 121 MMHG | DIASTOLIC BLOOD PRESSURE: 76 MMHG | RESPIRATION RATE: 18 BRPM | HEART RATE: 93 BPM

## 2018-04-26 VITALS
HEART RATE: 108 BPM | DIASTOLIC BLOOD PRESSURE: 78 MMHG | RESPIRATION RATE: 20 BRPM | TEMPERATURE: 98 F | HEIGHT: 64 IN | SYSTOLIC BLOOD PRESSURE: 138 MMHG | BODY MASS INDEX: 32.37 KG/M2 | WEIGHT: 189.63 LBS

## 2018-04-26 DIAGNOSIS — E78.00 PURE HYPERCHOLESTEROLEMIA: ICD-10-CM

## 2018-04-26 DIAGNOSIS — C50.811 MALIGNANT NEOPLASM OF OVERLAPPING SITES OF BOTH BREASTS IN FEMALE, ESTROGEN RECEPTOR POSITIVE: ICD-10-CM

## 2018-04-26 DIAGNOSIS — D70.1 CHEMOTHERAPY INDUCED NEUTROPENIA: ICD-10-CM

## 2018-04-26 DIAGNOSIS — Z17.0 MALIGNANT NEOPLASM OF OVERLAPPING SITES OF BOTH BREASTS IN FEMALE, ESTROGEN RECEPTOR POSITIVE: Primary | ICD-10-CM

## 2018-04-26 DIAGNOSIS — C50.812 MALIGNANT NEOPLASM OF OVERLAPPING SITES OF BOTH BREASTS IN FEMALE, ESTROGEN RECEPTOR POSITIVE: Primary | ICD-10-CM

## 2018-04-26 DIAGNOSIS — C50.811 MALIGNANT NEOPLASM OF OVERLAPPING SITES OF BOTH BREASTS IN FEMALE, ESTROGEN RECEPTOR POSITIVE: Primary | ICD-10-CM

## 2018-04-26 DIAGNOSIS — C50.812 MALIGNANT NEOPLASM OF OVERLAPPING SITES OF BOTH BREASTS IN FEMALE, ESTROGEN RECEPTOR POSITIVE: ICD-10-CM

## 2018-04-26 DIAGNOSIS — T45.1X5A ANTINEOPLASTIC CHEMOTHERAPY INDUCED ANEMIA: ICD-10-CM

## 2018-04-26 DIAGNOSIS — T45.1X5A CHEMOTHERAPY INDUCED NEUTROPENIA: ICD-10-CM

## 2018-04-26 DIAGNOSIS — E87.6 HYPOKALEMIA: ICD-10-CM

## 2018-04-26 DIAGNOSIS — E87.6 HYPOKALEMIA: Primary | ICD-10-CM

## 2018-04-26 DIAGNOSIS — Z17.0 MALIGNANT NEOPLASM OF OVERLAPPING SITES OF BOTH BREASTS IN FEMALE, ESTROGEN RECEPTOR POSITIVE: ICD-10-CM

## 2018-04-26 DIAGNOSIS — D64.81 ANTINEOPLASTIC CHEMOTHERAPY INDUCED ANEMIA: ICD-10-CM

## 2018-04-26 PROCEDURE — S0028 INJECTION, FAMOTIDINE, 20 MG: HCPCS | Mod: PN | Performed by: INTERNAL MEDICINE

## 2018-04-26 PROCEDURE — 96413 CHEMO IV INFUSION 1 HR: CPT | Mod: PN

## 2018-04-26 PROCEDURE — 96367 TX/PROPH/DG ADDL SEQ IV INF: CPT | Mod: PN

## 2018-04-26 PROCEDURE — 25000003 PHARM REV CODE 250: Mod: PN | Performed by: INTERNAL MEDICINE

## 2018-04-26 PROCEDURE — 63600175 PHARM REV CODE 636 W HCPCS: Mod: PN | Performed by: INTERNAL MEDICINE

## 2018-04-26 PROCEDURE — 96372 THER/PROPH/DIAG INJ SC/IM: CPT | Mod: PN

## 2018-04-26 PROCEDURE — A4216 STERILE WATER/SALINE, 10 ML: HCPCS | Mod: PN | Performed by: INTERNAL MEDICINE

## 2018-04-26 PROCEDURE — 99215 OFFICE O/P EST HI 40 MIN: CPT | Mod: S$GLB,,, | Performed by: INTERNAL MEDICINE

## 2018-04-26 PROCEDURE — 96375 TX/PRO/DX INJ NEW DRUG ADDON: CPT | Mod: PN

## 2018-04-26 PROCEDURE — 99999 PR PBB SHADOW E&M-EST. PATIENT-LVL III: CPT | Mod: PBBFAC,,, | Performed by: INTERNAL MEDICINE

## 2018-04-26 RX ORDER — SODIUM CHLORIDE 0.9 % (FLUSH) 0.9 %
10 SYRINGE (ML) INJECTION
Status: CANCELLED | OUTPATIENT
Start: 2018-04-26

## 2018-04-26 RX ORDER — FAMOTIDINE 20 MG/50ML
20 INJECTION, SOLUTION INTRAVENOUS
Status: COMPLETED | OUTPATIENT
Start: 2018-04-26 | End: 2018-04-26

## 2018-04-26 RX ORDER — DIPHENHYDRAMINE HYDROCHLORIDE 50 MG/ML
25 INJECTION INTRAMUSCULAR; INTRAVENOUS
Status: CANCELLED
Start: 2018-04-26 | End: 2018-04-26

## 2018-04-26 RX ORDER — DIPHENHYDRAMINE HYDROCHLORIDE 50 MG/ML
25 INJECTION INTRAMUSCULAR; INTRAVENOUS
Status: COMPLETED | OUTPATIENT
Start: 2018-04-26 | End: 2018-04-26

## 2018-04-26 RX ORDER — DIPHENHYDRAMINE HYDROCHLORIDE 50 MG/ML
50 INJECTION INTRAMUSCULAR; INTRAVENOUS ONCE AS NEEDED
Status: DISCONTINUED | OUTPATIENT
Start: 2018-04-26 | End: 2018-04-26 | Stop reason: HOSPADM

## 2018-04-26 RX ORDER — DIPHENHYDRAMINE HYDROCHLORIDE 50 MG/ML
50 INJECTION INTRAMUSCULAR; INTRAVENOUS ONCE AS NEEDED
Status: CANCELLED | OUTPATIENT
Start: 2018-04-26 | End: 2018-04-26

## 2018-04-26 RX ORDER — HEPARIN 100 UNIT/ML
500 SYRINGE INTRAVENOUS
Status: DISCONTINUED | OUTPATIENT
Start: 2018-04-26 | End: 2018-04-26 | Stop reason: HOSPADM

## 2018-04-26 RX ORDER — EPINEPHRINE 0.3 MG/.3ML
0.3 INJECTION SUBCUTANEOUS ONCE AS NEEDED
Status: CANCELLED | OUTPATIENT
Start: 2018-04-26 | End: 2018-04-26

## 2018-04-26 RX ORDER — HEPARIN 100 UNIT/ML
500 SYRINGE INTRAVENOUS
Status: CANCELLED | OUTPATIENT
Start: 2018-04-26

## 2018-04-26 RX ORDER — SODIUM CHLORIDE 0.9 % (FLUSH) 0.9 %
10 SYRINGE (ML) INJECTION
Status: DISCONTINUED | OUTPATIENT
Start: 2018-04-26 | End: 2018-04-26 | Stop reason: HOSPADM

## 2018-04-26 RX ORDER — FAMOTIDINE 10 MG/ML
20 INJECTION INTRAVENOUS
Status: CANCELLED | OUTPATIENT
Start: 2018-04-26

## 2018-04-26 RX ORDER — EPINEPHRINE 0.3 MG/.3ML
0.3 INJECTION SUBCUTANEOUS ONCE AS NEEDED
Status: DISCONTINUED | OUTPATIENT
Start: 2018-04-26 | End: 2018-04-26 | Stop reason: HOSPADM

## 2018-04-26 RX ADMIN — DEXAMETHASONE SODIUM PHOSPHATE 10 MG: 4 INJECTION, SOLUTION INTRA-ARTICULAR; INTRALESIONAL; INTRAMUSCULAR; INTRAVENOUS; SOFT TISSUE at 01:04

## 2018-04-26 RX ADMIN — DIPHENHYDRAMINE HYDROCHLORIDE 25 MG: 50 INJECTION, SOLUTION INTRAMUSCULAR; INTRAVENOUS at 01:04

## 2018-04-26 RX ADMIN — FAMOTIDINE 20 MG: 20 INJECTION, SOLUTION INTRAVENOUS at 01:04

## 2018-04-26 RX ADMIN — SODIUM CHLORIDE: 0.9 INJECTION, SOLUTION INTRAVENOUS at 01:04

## 2018-04-26 RX ADMIN — PACLITAXEL 156 MG: 6 INJECTION, SOLUTION INTRAVENOUS at 01:04

## 2018-04-26 RX ADMIN — SODIUM CHLORIDE, PRESERVATIVE FREE 10 ML: 5 INJECTION INTRAVENOUS at 03:04

## 2018-04-26 RX ADMIN — ERYTHROPOIETIN 40000 UNITS: 40000 INJECTION, SOLUTION INTRAVENOUS; SUBCUTANEOUS at 03:04

## 2018-04-26 NOTE — PROGRESS NOTES
Date of Service: 12/22/2017  Ga Cross is 62 years of age  woman  for newly diagnosed breast cancer.start of AC chemotherapy.  The patient had self   examination and found a lump.  She has undergone bilateral mastectomies with Dr. Cardenas.  Pathology reveals three sentinel lymph nodes on the right side, one   of which showed micrometastases size of 0.8 mm without extranodal extension.    The patient shows invasive ductal carcinoma 1.4 cm with low-grade ductal   carcinoma in situ on the right side.  On the left breast, the patient shows no   evidence of lymph node metastases on sentinel lymph node evaluation, focal   low-grade LCIS and DCIS on the left side, but no invasive component.  The   patient's invasive ductal carcinoma total size is about 2.3 cm, status post   mastectomy.  She stages as a T2 N1 MX.  The patient has the invasive ductal   carcinoma showing ER positivity, [place] positivity, and HER-2 negativity by FISH.    Has seen xrt in consult, DR. Graham  Completed AC started taxol last week feeling tired now  FAMILY HISTORY:  Noncontributory.  There is heart disease and dyslipidemia,   strokes and asthma.    SOCIAL HISTORY:  Nonsmoker, no recreational drug or alcohol use.  Occasional   social alcohol only.    SURGICAL HISTORY:  Significant for cholecystectomy, tonsillectomy and bilateral   mastectomies now.    MEDICAL HISTORY:  Significant for dyslipidemia, hypertension, mitral valve   prolapse, seasonal allergies.    MEDICATIONS:  Include Norvasc, cyclobenzaprine, HydroDIURIL, ibuprofen.    ALLERGIES:  She is allergic to hydromorphone and penicillin.    PHYSICAL EXAMINATION:   VITAL SIGNS:  Reveals the patient with 185 pound weight.  BSA 1.94, height of 5   feet 4 inches, no pain.  Wt Readings from Last 3 Encounters:   04/26/18 86 kg (189 lb 9.5 oz)   04/19/18 86.3 kg (190 lb 4.1 oz)   04/19/18 86.3 kg (190 lb 4.1 oz)     Temp Readings from Last 3 Encounters:   04/26/18 97.9 °F (36.6 °C)    04/19/18 98.6 °F (37 °C)   04/19/18 98.6 °F (37 °C)     BP Readings from Last 3 Encounters:   04/26/18 138/78   04/19/18 122/77   04/19/18 137/69     Pulse Readings from Last 3 Encounters:   04/26/18 108   04/19/18 92   04/19/18 97   GENERAL:  Awake, alert, oriented.  BREASTS:  Double mastectomy is healing well.  LUNGS:  Clear to auscultation.  No JVD.  Trachea is central.  CVS:  S1, S2 normally heard.  No murmurs or gallops.  ABDOMEN:  Soft.  No rebound, guarding or rigidity.  EXTREMITIES:  Without edema.  NEUROLOGICAL:  The patient is appropriate.    LABS:  Labs from   Lab Results   Component Value Date    WBC 4.34 04/26/2018    HGB 9.7 (L) 04/26/2018    HCT 29.7 (L) 04/26/2018     (H) 04/26/2018     04/26/2018     CMP  Sodium   Date Value Ref Range Status   04/26/2018 142 136 - 145 mmol/L Final     Potassium   Date Value Ref Range Status   04/26/2018 3.1 (L) 3.5 - 5.1 mmol/L Final     Chloride   Date Value Ref Range Status   04/26/2018 96 95 - 110 mmol/L Final     CO2   Date Value Ref Range Status   04/26/2018 33 (H) 22 - 31 mmol/L Final     Glucose   Date Value Ref Range Status   04/26/2018 125 (H) 70 - 110 mg/dL Final     Comment:     The ADA recommends the following guidelines for fasting glucose:  Normal:       less than 100 mg/dL  Prediabetes:  100 mg/dL to 125 mg/dL  Diabetes:     126 mg/dL or higher       BUN, Bld   Date Value Ref Range Status   04/26/2018 13 7 - 18 mg/dL Final     Creatinine   Date Value Ref Range Status   04/26/2018 0.62 0.50 - 1.40 mg/dL Final     Calcium   Date Value Ref Range Status   04/26/2018 9.6 8.4 - 10.2 mg/dL Final     Total Protein   Date Value Ref Range Status   04/26/2018 6.1 6.0 - 8.4 g/dL Final     Albumin   Date Value Ref Range Status   04/26/2018 3.9 3.5 - 5.2 g/dL Final     Total Bilirubin   Date Value Ref Range Status   04/26/2018 0.7 0.2 - 1.3 mg/dL Final     Alkaline Phosphatase   Date Value Ref Range Status   04/26/2018 75 38 - 145 U/L Final     AST  ([place])   Date Value Ref Range Status   01/18/2016 22 14 - 36 U/L Final     AST   Date Value Ref Range Status   04/26/2018 24 14 - 36 U/L Final     ALT   Date Value Ref Range Status   04/26/2018 30 10 - 44 U/L Final     Anion Gap   Date Value Ref Range Status   04/26/2018 13 8 - 16 mmol/L Final     eGFR if    Date Value Ref Range Status   04/26/2018 >60 >60 mL/min/1.73 m^2 Final     eGFR if non    Date Value Ref Range Status   04/26/2018 >60 >60 mL/min/1.73 m^2 Final     Comment:     Calculation used to obtain the estimated glomerular filtration  rate (eGFR) is the CKD-EPI equation.      PATHOLOGY:  As mentioned above.    IMPRESSION:  T2 N1 MX breast cancer with DCIS on left side and invasive   component on the right side.  ER/[place] positive, HER-2 negative.  PET done , neg for mets  PLAN:       continue with taxol procrit for anemia from chemo weekly   ran out of K will fill it today       rtc 1weeks with cbc, cmp for cycle 8 /12 then xrt+arimidex   add procrit weekly

## 2018-04-26 NOTE — PLAN OF CARE
Problem: Patient Care Overview  Goal: Plan of Care Review  Outcome: Ongoing (interventions implemented as appropriate)  Pt arrived for Taxol (1/10/18) chemo infusion. PAC accessed X 1 attempt to RCW, +  Blood return noted/flushes easily. Pt tolerated treatment very well. PAC flushed/de accessed w/o difficulty. Pt D/C'd home with instructions

## 2018-05-03 ENCOUNTER — OFFICE VISIT (OUTPATIENT)
Dept: HEMATOLOGY/ONCOLOGY | Facility: CLINIC | Age: 63
End: 2018-05-03
Payer: COMMERCIAL

## 2018-05-03 ENCOUNTER — INFUSION (OUTPATIENT)
Dept: INFUSION THERAPY | Facility: HOSPITAL | Age: 63
End: 2018-05-03
Attending: INTERNAL MEDICINE
Payer: COMMERCIAL

## 2018-05-03 VITALS
SYSTOLIC BLOOD PRESSURE: 117 MMHG | DIASTOLIC BLOOD PRESSURE: 71 MMHG | HEART RATE: 91 BPM | RESPIRATION RATE: 20 BRPM | BODY MASS INDEX: 32.6 KG/M2 | TEMPERATURE: 98 F | HEIGHT: 64 IN | WEIGHT: 190.94 LBS

## 2018-05-03 VITALS
TEMPERATURE: 98 F | DIASTOLIC BLOOD PRESSURE: 71 MMHG | RESPIRATION RATE: 20 BRPM | BODY MASS INDEX: 32.6 KG/M2 | SYSTOLIC BLOOD PRESSURE: 156 MMHG | HEIGHT: 64 IN | HEART RATE: 98 BPM | WEIGHT: 190.94 LBS

## 2018-05-03 DIAGNOSIS — C50.812 MALIGNANT NEOPLASM OF OVERLAPPING SITES OF BOTH BREASTS IN FEMALE, ESTROGEN RECEPTOR POSITIVE: ICD-10-CM

## 2018-05-03 DIAGNOSIS — Z17.0 MALIGNANT NEOPLASM OF OVERLAPPING SITES OF BOTH BREASTS IN FEMALE, ESTROGEN RECEPTOR POSITIVE: ICD-10-CM

## 2018-05-03 DIAGNOSIS — C50.811 MALIGNANT NEOPLASM OF OVERLAPPING SITES OF BOTH BREASTS IN FEMALE, ESTROGEN RECEPTOR POSITIVE: ICD-10-CM

## 2018-05-03 DIAGNOSIS — T45.1X5A ANTINEOPLASTIC CHEMOTHERAPY INDUCED ANEMIA: ICD-10-CM

## 2018-05-03 DIAGNOSIS — D64.81 ANTINEOPLASTIC CHEMOTHERAPY INDUCED ANEMIA: ICD-10-CM

## 2018-05-03 DIAGNOSIS — E78.00 PURE HYPERCHOLESTEROLEMIA: ICD-10-CM

## 2018-05-03 DIAGNOSIS — E87.6 HYPOKALEMIA: Primary | ICD-10-CM

## 2018-05-03 DIAGNOSIS — T45.1X5A CHEMOTHERAPY INDUCED NEUTROPENIA: ICD-10-CM

## 2018-05-03 DIAGNOSIS — D70.1 CHEMOTHERAPY INDUCED NEUTROPENIA: ICD-10-CM

## 2018-05-03 DIAGNOSIS — C50.011 MALIGNANT NEOPLASM OF NIPPLE OF RIGHT BREAST IN FEMALE, UNSPECIFIED ESTROGEN RECEPTOR STATUS: Primary | ICD-10-CM

## 2018-05-03 PROCEDURE — 63600175 PHARM REV CODE 636 W HCPCS: Mod: TB,PN | Performed by: INTERNAL MEDICINE

## 2018-05-03 PROCEDURE — 25000003 PHARM REV CODE 250: Mod: PN | Performed by: INTERNAL MEDICINE

## 2018-05-03 PROCEDURE — S0028 INJECTION, FAMOTIDINE, 20 MG: HCPCS | Mod: PN | Performed by: INTERNAL MEDICINE

## 2018-05-03 PROCEDURE — 96375 TX/PRO/DX INJ NEW DRUG ADDON: CPT | Mod: PN

## 2018-05-03 PROCEDURE — A4216 STERILE WATER/SALINE, 10 ML: HCPCS | Mod: PN | Performed by: INTERNAL MEDICINE

## 2018-05-03 PROCEDURE — 96372 THER/PROPH/DIAG INJ SC/IM: CPT | Mod: PN

## 2018-05-03 PROCEDURE — 96367 TX/PROPH/DG ADDL SEQ IV INF: CPT | Mod: PN

## 2018-05-03 PROCEDURE — 99215 OFFICE O/P EST HI 40 MIN: CPT | Mod: S$GLB,,, | Performed by: INTERNAL MEDICINE

## 2018-05-03 PROCEDURE — 96413 CHEMO IV INFUSION 1 HR: CPT | Mod: PN

## 2018-05-03 PROCEDURE — 99999 PR PBB SHADOW E&M-EST. PATIENT-LVL III: CPT | Mod: PBBFAC,,, | Performed by: INTERNAL MEDICINE

## 2018-05-03 RX ORDER — DIPHENHYDRAMINE HYDROCHLORIDE 50 MG/ML
25 INJECTION INTRAMUSCULAR; INTRAVENOUS
Status: CANCELLED
Start: 2018-05-03 | End: 2018-05-03

## 2018-05-03 RX ORDER — SODIUM CHLORIDE 0.9 % (FLUSH) 0.9 %
10 SYRINGE (ML) INJECTION
Status: CANCELLED | OUTPATIENT
Start: 2018-05-03

## 2018-05-03 RX ORDER — SODIUM CHLORIDE 0.9 % (FLUSH) 0.9 %
10 SYRINGE (ML) INJECTION
Status: DISCONTINUED | OUTPATIENT
Start: 2018-05-03 | End: 2018-05-03 | Stop reason: HOSPADM

## 2018-05-03 RX ORDER — DIPHENHYDRAMINE HYDROCHLORIDE 50 MG/ML
25 INJECTION INTRAMUSCULAR; INTRAVENOUS
Status: COMPLETED | OUTPATIENT
Start: 2018-05-03 | End: 2018-05-03

## 2018-05-03 RX ORDER — EPINEPHRINE 0.3 MG/.3ML
0.3 INJECTION SUBCUTANEOUS ONCE AS NEEDED
Status: CANCELLED | OUTPATIENT
Start: 2018-05-03 | End: 2018-05-03

## 2018-05-03 RX ORDER — FAMOTIDINE 10 MG/ML
20 INJECTION INTRAVENOUS
Status: CANCELLED | OUTPATIENT
Start: 2018-05-03

## 2018-05-03 RX ORDER — FAMOTIDINE 20 MG/50ML
20 INJECTION, SOLUTION INTRAVENOUS
Status: COMPLETED | OUTPATIENT
Start: 2018-05-03 | End: 2018-05-03

## 2018-05-03 RX ORDER — HEPARIN 100 UNIT/ML
500 SYRINGE INTRAVENOUS
Status: CANCELLED | OUTPATIENT
Start: 2018-05-03

## 2018-05-03 RX ORDER — DIPHENHYDRAMINE HYDROCHLORIDE 50 MG/ML
50 INJECTION INTRAMUSCULAR; INTRAVENOUS ONCE AS NEEDED
Status: CANCELLED | OUTPATIENT
Start: 2018-05-03 | End: 2018-05-03

## 2018-05-03 RX ADMIN — DIPHENHYDRAMINE HYDROCHLORIDE 25 MG: 50 INJECTION INTRAMUSCULAR; INTRAVENOUS at 12:05

## 2018-05-03 RX ADMIN — PACLITAXEL 156 MG: 6 INJECTION, SOLUTION INTRAVENOUS at 01:05

## 2018-05-03 RX ADMIN — DEXAMETHASONE SODIUM PHOSPHATE 10 MG: 4 INJECTION, SOLUTION INTRA-ARTICULAR; INTRALESIONAL; INTRAMUSCULAR; INTRAVENOUS; SOFT TISSUE at 12:05

## 2018-05-03 RX ADMIN — FAMOTIDINE 20 MG: 20 INJECTION, SOLUTION INTRAVENOUS at 01:05

## 2018-05-03 RX ADMIN — ERYTHROPOIETIN 40000 UNITS: 40000 INJECTION, SOLUTION INTRAVENOUS; SUBCUTANEOUS at 12:05

## 2018-05-03 RX ADMIN — SODIUM CHLORIDE: 0.9 INJECTION, SOLUTION INTRAVENOUS at 12:05

## 2018-05-03 RX ADMIN — Medication 10 ML: at 12:05

## 2018-05-03 NOTE — PLAN OF CARE
Problem: Patient Care Overview  Goal: Discharge Needs Assessment  Adequate for discharge.   Pt tolerated  infusion without noted distress.  Reviewed upcoming appointments.  All questions answered. Advised to call MD with any questions or concerns.   Ambulated from infusion Tolerated injection without any distress.  Reviewed upcoming appointments.  Amb off unit independently by self.

## 2018-05-03 NOTE — PROGRESS NOTES
Ga Cross is 62 years of age  woman  for  breast cancer.start of AC chemotherapy.      She has undergone bilateral mastectomies with Dr. Cardenas.  Pathology reveals three sentinel lymph nodes on the right side, one   of which showed micrometastases size of 0.8 mm without extranodal extension.    The patient shows invasive ductal carcinoma 1.4 cm with low-grade ductal   carcinoma in situ on the right side.  On the left breast, the patient shows no   evidence of lymph node metastases on sentinel lymph node evaluation, focal   low-grade LCIS and DCIS on the left side, but no invasive component.  The   patient's invasive ductal carcinoma total size is about 2.3 cm, status post   mastectomy.  She stages as a T2 N1 MX.  The patient has the invasive ductal   carcinoma showing ER positivity, PA positivity, and HER-2 negativity by FISH.    Has seen xrt in consult, DR. Graham  Completed AC on taxol   .  MEDICAL HISTORY:  Significant for dyslipidemia, hypertension, mitral valve   prolapse, seasonal allergies.    MEDICATIONS:  Include Norvasc, cyclobenzaprine, HydroDIURIL, ibuprofen.zofran, mouthwash, compazine prn    ALLERGIES:  She is allergic to hydromorphone and penicillin.    PHYSICAL EXAMINATION:   VITAL SIGNS:  Reveals the patient with 185 pound weight.  BSA 1.94, height of 5   feet 4 inches, no pain.  Wt Readings from Last 3 Encounters:   05/03/18 86.6 kg (190 lb 14.7 oz)   05/02/18 87.1 kg (192 lb)   04/26/18 86 kg (189 lb 9.5 oz)     Temp Readings from Last 3 Encounters:   05/03/18 98.1 °F (36.7 °C)   04/26/18 97.9 °F (36.6 °C)   04/19/18 98.6 °F (37 °C)     BP Readings from Last 3 Encounters:   05/03/18 (!) 156/71   05/02/18 130/76   04/26/18 121/76     Pulse Readings from Last 3 Encounters:   05/03/18 98   05/02/18 100   04/26/18 93   GENERAL:  Awake, alert, oriented.  BREASTS:  Double mastectomy is healing well.  LUNGS:  Clear to auscultation.  No JVD.  Trachea is central.  CVS:  S1, S2  normally heard.  No murmurs or gallops.  ABDOMEN:  Soft.  No rebound, guarding or rigidity.  EXTREMITIES:  Without edema.  NEUROLOGICAL:  The patient is appropriate.    LABS:  Labs from   Lab Results   Component Value Date    WBC 3.95 05/03/2018    HGB 9.4 (L) 05/03/2018    HCT 28.5 (L) 05/03/2018     (H) 05/03/2018     05/03/2018     CMP  Sodium   Date Value Ref Range Status   05/03/2018 141 136 - 145 mmol/L Final     Potassium   Date Value Ref Range Status   05/03/2018 3.4 (L) 3.5 - 5.1 mmol/L Final     Chloride   Date Value Ref Range Status   05/03/2018 99 95 - 110 mmol/L Final     CO2   Date Value Ref Range Status   05/03/2018 30 22 - 31 mmol/L Final     Glucose   Date Value Ref Range Status   05/03/2018 145 (H) 70 - 110 mg/dL Final     Comment:     The ADA recommends the following guidelines for fasting glucose:  Normal:       less than 100 mg/dL  Prediabetes:  100 mg/dL to 125 mg/dL  Diabetes:     126 mg/dL or higher       BUN, Bld   Date Value Ref Range Status   05/03/2018 13 7 - 18 mg/dL Final     Creatinine   Date Value Ref Range Status   05/03/2018 0.59 0.50 - 1.40 mg/dL Final     Calcium   Date Value Ref Range Status   05/03/2018 9.4 8.4 - 10.2 mg/dL Final     Total Protein   Date Value Ref Range Status   05/03/2018 5.9 (L) 6.0 - 8.4 g/dL Final     Albumin   Date Value Ref Range Status   05/03/2018 3.7 3.5 - 5.2 g/dL Final     Total Bilirubin   Date Value Ref Range Status   05/03/2018 0.6 0.2 - 1.3 mg/dL Final     Alkaline Phosphatase   Date Value Ref Range Status   05/03/2018 75 38 - 145 U/L Final     AST ([place])   Date Value Ref Range Status   01/18/2016 22 14 - 36 U/L Final     AST   Date Value Ref Range Status   05/03/2018 24 14 - 36 U/L Final     ALT   Date Value Ref Range Status   05/03/2018 26 10 - 44 U/L Final     Anion Gap   Date Value Ref Range Status   05/03/2018 12 8 - 16 mmol/L Final     eGFR if    Date Value Ref Range Status   05/03/2018 >60 >60  mL/min/1.73 m^2 Final     eGFR if non    Date Value Ref Range Status   05/03/2018 >60 >60 mL/min/1.73 m^2 Final     Comment:     Calculation used to obtain the estimated glomerular filtration  rate (eGFR) is the CKD-EPI equation.      PATHOLOGY:  As mentioned above.    IMPRESSION:  T2 N1 MX breast cancer with DCIS on left side and invasive   component on the right side.  ER/AL positive, HER-2 negative.  PET done , neg for mets  PLAN:       continue with taxol procrit for anemia from chemo weekly   ran out of K will fill it today, cont to supplement       rtc 1weeks with cbc, cmp for cycle 9 /12 then xrt+arimidex   add procrit weekly

## 2018-05-10 ENCOUNTER — INFUSION (OUTPATIENT)
Dept: INFUSION THERAPY | Facility: HOSPITAL | Age: 63
End: 2018-05-10
Attending: INTERNAL MEDICINE
Payer: COMMERCIAL

## 2018-05-10 ENCOUNTER — OFFICE VISIT (OUTPATIENT)
Dept: HEMATOLOGY/ONCOLOGY | Facility: CLINIC | Age: 63
End: 2018-05-10
Payer: COMMERCIAL

## 2018-05-10 VITALS
RESPIRATION RATE: 16 BRPM | SYSTOLIC BLOOD PRESSURE: 113 MMHG | TEMPERATURE: 98 F | WEIGHT: 189.81 LBS | DIASTOLIC BLOOD PRESSURE: 72 MMHG | HEIGHT: 64 IN | BODY MASS INDEX: 32.41 KG/M2 | HEART RATE: 98 BPM

## 2018-05-10 VITALS
RESPIRATION RATE: 16 BRPM | DIASTOLIC BLOOD PRESSURE: 91 MMHG | HEART RATE: 95 BPM | HEIGHT: 64 IN | BODY MASS INDEX: 32.41 KG/M2 | WEIGHT: 189.81 LBS | SYSTOLIC BLOOD PRESSURE: 141 MMHG | TEMPERATURE: 98 F

## 2018-05-10 DIAGNOSIS — C50.812 MALIGNANT NEOPLASM OF OVERLAPPING SITES OF BOTH BREASTS IN FEMALE, ESTROGEN RECEPTOR POSITIVE: ICD-10-CM

## 2018-05-10 DIAGNOSIS — D70.1 CHEMOTHERAPY INDUCED NEUTROPENIA: ICD-10-CM

## 2018-05-10 DIAGNOSIS — E78.00 PURE HYPERCHOLESTEROLEMIA: ICD-10-CM

## 2018-05-10 DIAGNOSIS — Z17.0 MALIGNANT NEOPLASM OF OVERLAPPING SITES OF BOTH BREASTS IN FEMALE, ESTROGEN RECEPTOR POSITIVE: Primary | ICD-10-CM

## 2018-05-10 DIAGNOSIS — T45.1X5A ANTINEOPLASTIC CHEMOTHERAPY INDUCED ANEMIA: ICD-10-CM

## 2018-05-10 DIAGNOSIS — D64.81 ANTINEOPLASTIC CHEMOTHERAPY INDUCED ANEMIA: ICD-10-CM

## 2018-05-10 DIAGNOSIS — C50.811 MALIGNANT NEOPLASM OF OVERLAPPING SITES OF BOTH BREASTS IN FEMALE, ESTROGEN RECEPTOR POSITIVE: ICD-10-CM

## 2018-05-10 DIAGNOSIS — T45.1X5A CHEMOTHERAPY INDUCED NEUTROPENIA: ICD-10-CM

## 2018-05-10 DIAGNOSIS — C50.811 MALIGNANT NEOPLASM OF OVERLAPPING SITES OF BOTH BREASTS IN FEMALE, ESTROGEN RECEPTOR POSITIVE: Primary | ICD-10-CM

## 2018-05-10 DIAGNOSIS — E87.6 HYPOKALEMIA: Primary | ICD-10-CM

## 2018-05-10 DIAGNOSIS — C50.812 MALIGNANT NEOPLASM OF OVERLAPPING SITES OF BOTH BREASTS IN FEMALE, ESTROGEN RECEPTOR POSITIVE: Primary | ICD-10-CM

## 2018-05-10 DIAGNOSIS — Z17.0 MALIGNANT NEOPLASM OF OVERLAPPING SITES OF BOTH BREASTS IN FEMALE, ESTROGEN RECEPTOR POSITIVE: ICD-10-CM

## 2018-05-10 PROCEDURE — 99999 PR PBB SHADOW E&M-EST. PATIENT-LVL III: CPT | Mod: PBBFAC,,, | Performed by: INTERNAL MEDICINE

## 2018-05-10 PROCEDURE — 96413 CHEMO IV INFUSION 1 HR: CPT | Mod: PN

## 2018-05-10 PROCEDURE — S0028 INJECTION, FAMOTIDINE, 20 MG: HCPCS | Mod: PN | Performed by: INTERNAL MEDICINE

## 2018-05-10 PROCEDURE — 96372 THER/PROPH/DIAG INJ SC/IM: CPT | Mod: PN

## 2018-05-10 PROCEDURE — 63600175 PHARM REV CODE 636 W HCPCS: Mod: PN | Performed by: INTERNAL MEDICINE

## 2018-05-10 PROCEDURE — 96375 TX/PRO/DX INJ NEW DRUG ADDON: CPT | Mod: PN

## 2018-05-10 PROCEDURE — 99215 OFFICE O/P EST HI 40 MIN: CPT | Mod: S$GLB,,, | Performed by: INTERNAL MEDICINE

## 2018-05-10 PROCEDURE — 96367 TX/PROPH/DG ADDL SEQ IV INF: CPT | Mod: PN

## 2018-05-10 PROCEDURE — A4216 STERILE WATER/SALINE, 10 ML: HCPCS | Mod: PN | Performed by: INTERNAL MEDICINE

## 2018-05-10 PROCEDURE — 25000003 PHARM REV CODE 250: Mod: PN | Performed by: INTERNAL MEDICINE

## 2018-05-10 RX ORDER — SODIUM CHLORIDE 0.9 % (FLUSH) 0.9 %
10 SYRINGE (ML) INJECTION
Status: CANCELLED | OUTPATIENT
Start: 2018-05-10

## 2018-05-10 RX ORDER — HEPARIN 100 UNIT/ML
500 SYRINGE INTRAVENOUS
Status: CANCELLED | OUTPATIENT
Start: 2018-05-10

## 2018-05-10 RX ORDER — DIPHENHYDRAMINE HYDROCHLORIDE 50 MG/ML
25 INJECTION INTRAMUSCULAR; INTRAVENOUS
Status: CANCELLED
Start: 2018-05-17 | End: 2018-05-17

## 2018-05-10 RX ORDER — DIPHENHYDRAMINE HYDROCHLORIDE 50 MG/ML
50 INJECTION INTRAMUSCULAR; INTRAVENOUS ONCE AS NEEDED
Status: CANCELLED | OUTPATIENT
Start: 2018-05-17 | End: 2018-05-17

## 2018-05-10 RX ORDER — DIPHENHYDRAMINE HYDROCHLORIDE 50 MG/ML
25 INJECTION INTRAMUSCULAR; INTRAVENOUS
Status: COMPLETED | OUTPATIENT
Start: 2018-05-10 | End: 2018-05-10

## 2018-05-10 RX ORDER — FAMOTIDINE 10 MG/ML
20 INJECTION INTRAVENOUS
Status: CANCELLED | OUTPATIENT
Start: 2018-05-10

## 2018-05-10 RX ORDER — HEPARIN 100 UNIT/ML
500 SYRINGE INTRAVENOUS
Status: CANCELLED | OUTPATIENT
Start: 2018-05-17

## 2018-05-10 RX ORDER — SODIUM CHLORIDE 0.9 % (FLUSH) 0.9 %
10 SYRINGE (ML) INJECTION
Status: CANCELLED | OUTPATIENT
Start: 2018-05-17

## 2018-05-10 RX ORDER — DIPHENHYDRAMINE HYDROCHLORIDE 50 MG/ML
50 INJECTION INTRAMUSCULAR; INTRAVENOUS ONCE AS NEEDED
Status: CANCELLED | OUTPATIENT
Start: 2018-05-24 | End: 2018-05-24

## 2018-05-10 RX ORDER — FAMOTIDINE 20 MG/50ML
20 INJECTION, SOLUTION INTRAVENOUS
Status: COMPLETED | OUTPATIENT
Start: 2018-05-10 | End: 2018-05-10

## 2018-05-10 RX ORDER — EPINEPHRINE 0.3 MG/.3ML
0.3 INJECTION SUBCUTANEOUS ONCE AS NEEDED
Status: CANCELLED | OUTPATIENT
Start: 2018-05-10 | End: 2018-05-10

## 2018-05-10 RX ORDER — HEPARIN 100 UNIT/ML
500 SYRINGE INTRAVENOUS
Status: CANCELLED | OUTPATIENT
Start: 2018-05-24

## 2018-05-10 RX ORDER — DIPHENHYDRAMINE HYDROCHLORIDE 50 MG/ML
25 INJECTION INTRAMUSCULAR; INTRAVENOUS
Status: CANCELLED
Start: 2018-05-24 | End: 2018-05-24

## 2018-05-10 RX ORDER — DIPHENHYDRAMINE HYDROCHLORIDE 50 MG/ML
50 INJECTION INTRAMUSCULAR; INTRAVENOUS ONCE AS NEEDED
Status: CANCELLED | OUTPATIENT
Start: 2018-05-10 | End: 2018-05-10

## 2018-05-10 RX ORDER — DIPHENHYDRAMINE HYDROCHLORIDE 50 MG/ML
25 INJECTION INTRAMUSCULAR; INTRAVENOUS
Status: CANCELLED
Start: 2018-05-10 | End: 2018-05-10

## 2018-05-10 RX ORDER — SODIUM CHLORIDE 0.9 % (FLUSH) 0.9 %
10 SYRINGE (ML) INJECTION
Status: CANCELLED | OUTPATIENT
Start: 2018-05-24

## 2018-05-10 RX ORDER — SODIUM CHLORIDE 0.9 % (FLUSH) 0.9 %
10 SYRINGE (ML) INJECTION
Status: DISCONTINUED | OUTPATIENT
Start: 2018-05-10 | End: 2018-05-10 | Stop reason: HOSPADM

## 2018-05-10 RX ORDER — FAMOTIDINE 10 MG/ML
20 INJECTION INTRAVENOUS
Status: CANCELLED | OUTPATIENT
Start: 2018-05-24

## 2018-05-10 RX ORDER — EPINEPHRINE 0.3 MG/.3ML
0.3 INJECTION SUBCUTANEOUS ONCE AS NEEDED
Status: CANCELLED | OUTPATIENT
Start: 2018-05-24 | End: 2018-05-24

## 2018-05-10 RX ORDER — EPINEPHRINE 0.3 MG/.3ML
0.3 INJECTION SUBCUTANEOUS ONCE AS NEEDED
Status: CANCELLED | OUTPATIENT
Start: 2018-05-17 | End: 2018-05-17

## 2018-05-10 RX ORDER — FAMOTIDINE 10 MG/ML
20 INJECTION INTRAVENOUS
Status: CANCELLED | OUTPATIENT
Start: 2018-05-17

## 2018-05-10 RX ADMIN — PACLITAXEL 156 MG: 6 INJECTION, SOLUTION INTRAVENOUS at 01:05

## 2018-05-10 RX ADMIN — SODIUM CHLORIDE, PRESERVATIVE FREE 10 ML: 5 INJECTION INTRAVENOUS at 12:05

## 2018-05-10 RX ADMIN — FAMOTIDINE 20 MG: 20 INJECTION, SOLUTION INTRAVENOUS at 01:05

## 2018-05-10 RX ADMIN — SODIUM CHLORIDE: 0.9 INJECTION, SOLUTION INTRAVENOUS at 12:05

## 2018-05-10 RX ADMIN — DEXAMETHASONE SODIUM PHOSPHATE 10 MG: 4 INJECTION, SOLUTION INTRA-ARTICULAR; INTRALESIONAL; INTRAMUSCULAR; INTRAVENOUS; SOFT TISSUE at 12:05

## 2018-05-10 RX ADMIN — ERYTHROPOIETIN 40000 UNITS: 40000 INJECTION, SOLUTION INTRAVENOUS; SUBCUTANEOUS at 12:05

## 2018-05-10 RX ADMIN — DIPHENHYDRAMINE HYDROCHLORIDE 25 MG: 50 INJECTION INTRAMUSCULAR; INTRAVENOUS at 12:05

## 2018-05-10 NOTE — PLAN OF CARE
Problem: Patient Care Overview  Goal: Discharge Needs Assessment  Outcome: Ongoing (interventions implemented as appropriate)  Pt tolerated well will return next week. Appointment reviewed and confirmed with patient. Pt discharged ambulatory in stable condition AVANI

## 2018-05-10 NOTE — PROGRESS NOTES
Ga Cross is 62 years of age  woman  for  breast cancer.start of AC chemotherapy.      She has undergone bilateral mastectomies with Dr. Cardenas.  Pathology reveals three sentinel lymph nodes on the right side, one   of which showed micrometastases size of 0.8 mm without extranodal extension.    The patient shows invasive ductal carcinoma 1.4 cm with low-grade ductal   carcinoma in situ on the right side.  On the left breast, the patient shows no   evidence of lymph node metastases on sentinel lymph node evaluation, focal   low-grade LCIS and DCIS on the left side, but no invasive component.  The   patient's invasive ductal carcinoma total size is about 2.3 cm, status post   mastectomy.  She stages as a T2 N1 MX.  The patient has the invasive ductal   carcinoma showing ER positivity, [place] positivity, and HER-2 negativity by FISH.    Has seen xrt in consult, DR. Graham  Completed AC on taxol   .  MEDICAL HISTORY:  Significant for dyslipidemia, hypertension, mitral valve   prolapse, seasonal allergies.    MEDICATIONS:  Include Norvasc, cyclobenzaprine, HydroDIURIL, ibuprofen.zofran, mouthwash, compazine prn    ALLERGIES:  She is allergic to hydromorphone and penicillin.    PHYSICAL EXAMINATION:   VITAL SIGNS:  Reveals the patient with 185 pound weight.  BSA 1.94, height of 5   feet 4 inches, no pain.  Wt Readings from Last 3 Encounters:   05/10/18 86.1 kg (189 lb 13.1 oz)   05/03/18 86.6 kg (190 lb 14.7 oz)   05/03/18 86.6 kg (190 lb 14.7 oz)     Temp Readings from Last 3 Encounters:   05/10/18 97.7 °F (36.5 °C)   05/03/18 98.1 °F (36.7 °C)   05/03/18 98.1 °F (36.7 °C)     BP Readings from Last 3 Encounters:   05/10/18 (!) 141/91   05/03/18 117/71   05/03/18 (!) 156/71     Pulse Readings from Last 3 Encounters:   05/10/18 95   05/03/18 91   05/03/18 98   GENERAL:  Awake, alert, oriented.  BREASTS:  Double mastectomy is healing well.  LUNGS:  Clear to auscultation.  No JVD.  Trachea is  central.  CVS:  S1, S2 normally heard.  No murmurs or gallops.  ABDOMEN:  Soft.  No rebound, guarding or rigidity.  EXTREMITIES:  Without edema.  NEUROLOGICAL:  The patient is appropriate.    LABS:  Labs from   Lab Results   Component Value Date    WBC 4.34 05/10/2018    HGB 9.4 (L) 05/10/2018    HCT 28.9 (L) 05/10/2018     (H) 05/10/2018     05/10/2018     CMP  Sodium   Date Value Ref Range Status   05/10/2018 140 136 - 145 mmol/L Final     Potassium   Date Value Ref Range Status   05/10/2018 3.1 (L) 3.5 - 5.1 mmol/L Final     Chloride   Date Value Ref Range Status   05/10/2018 97 95 - 110 mmol/L Final     CO2   Date Value Ref Range Status   05/10/2018 31 22 - 31 mmol/L Final     Glucose   Date Value Ref Range Status   05/10/2018 156 (H) 70 - 110 mg/dL Final     Comment:     The ADA recommends the following guidelines for fasting glucose:  Normal:       less than 100 mg/dL  Prediabetes:  100 mg/dL to 125 mg/dL  Diabetes:     126 mg/dL or higher       BUN, Bld   Date Value Ref Range Status   05/10/2018 13 7 - 18 mg/dL Final     Creatinine   Date Value Ref Range Status   05/10/2018 0.65 0.50 - 1.40 mg/dL Final     Calcium   Date Value Ref Range Status   05/10/2018 9.4 8.4 - 10.2 mg/dL Final     Total Protein   Date Value Ref Range Status   05/10/2018 6.1 6.0 - 8.4 g/dL Final     Albumin   Date Value Ref Range Status   05/10/2018 3.8 3.5 - 5.2 g/dL Final     Total Bilirubin   Date Value Ref Range Status   05/10/2018 0.7 0.2 - 1.3 mg/dL Final     Alkaline Phosphatase   Date Value Ref Range Status   05/10/2018 68 38 - 145 U/L Final     AST ([place])   Date Value Ref Range Status   01/18/2016 22 14 - 36 U/L Final     AST   Date Value Ref Range Status   05/10/2018 25 14 - 36 U/L Final     ALT   Date Value Ref Range Status   05/10/2018 35 10 - 44 U/L Final     Anion Gap   Date Value Ref Range Status   05/10/2018 12 8 - 16 mmol/L Final     eGFR if    Date Value Ref Range Status    05/10/2018 >60 >60 mL/min/1.73 m^2 Final     eGFR if non    Date Value Ref Range Status   05/10/2018 >60 >60 mL/min/1.73 m^2 Final     Comment:     Calculation used to obtain the estimated glomerular filtration  rate (eGFR) is the CKD-EPI equation.      PATHOLOGY:  As mentioned above.    IMPRESSION:  T2 N1 MX breast cancer with DCIS on left side and invasive   component on the right side.  ER/DE positive, HER-2 negative.  PET done , neg for mets  PLAN:       continue with taxol procrit for anemia from chemo weekly   ran out of K will fill it today, cont to supplement       rtc 1weeks with cbc, cmp for cycle 9 /12 then xrt+arimidex   add procrit weekly

## 2018-05-17 ENCOUNTER — INFUSION (OUTPATIENT)
Dept: INFUSION THERAPY | Facility: HOSPITAL | Age: 63
End: 2018-05-17
Attending: INTERNAL MEDICINE
Payer: COMMERCIAL

## 2018-05-17 ENCOUNTER — OFFICE VISIT (OUTPATIENT)
Dept: HEMATOLOGY/ONCOLOGY | Facility: CLINIC | Age: 63
End: 2018-05-17
Payer: COMMERCIAL

## 2018-05-17 VITALS
HEIGHT: 64 IN | SYSTOLIC BLOOD PRESSURE: 102 MMHG | HEART RATE: 95 BPM | WEIGHT: 189.13 LBS | BODY MASS INDEX: 32.29 KG/M2 | RESPIRATION RATE: 16 BRPM | TEMPERATURE: 98 F | DIASTOLIC BLOOD PRESSURE: 63 MMHG

## 2018-05-17 VITALS
BODY MASS INDEX: 32.29 KG/M2 | HEIGHT: 64 IN | RESPIRATION RATE: 16 BRPM | HEART RATE: 99 BPM | DIASTOLIC BLOOD PRESSURE: 72 MMHG | TEMPERATURE: 98 F | SYSTOLIC BLOOD PRESSURE: 149 MMHG | WEIGHT: 189.13 LBS

## 2018-05-17 DIAGNOSIS — I10 BENIGN ESSENTIAL HTN: ICD-10-CM

## 2018-05-17 DIAGNOSIS — T45.1X5A ANTINEOPLASTIC CHEMOTHERAPY INDUCED ANEMIA: ICD-10-CM

## 2018-05-17 DIAGNOSIS — C50.811 MALIGNANT NEOPLASM OF OVERLAPPING SITES OF BOTH BREASTS IN FEMALE, ESTROGEN RECEPTOR POSITIVE: ICD-10-CM

## 2018-05-17 DIAGNOSIS — Z17.0 MALIGNANT NEOPLASM OF OVERLAPPING SITES OF BOTH BREASTS IN FEMALE, ESTROGEN RECEPTOR POSITIVE: ICD-10-CM

## 2018-05-17 DIAGNOSIS — E78.00 PURE HYPERCHOLESTEROLEMIA: ICD-10-CM

## 2018-05-17 DIAGNOSIS — C50.812 MALIGNANT NEOPLASM OF OVERLAPPING SITES OF BOTH BREASTS IN FEMALE, ESTROGEN RECEPTOR POSITIVE: ICD-10-CM

## 2018-05-17 DIAGNOSIS — D64.81 ANTINEOPLASTIC CHEMOTHERAPY INDUCED ANEMIA: ICD-10-CM

## 2018-05-17 DIAGNOSIS — E87.6 HYPOKALEMIA: Primary | ICD-10-CM

## 2018-05-17 DIAGNOSIS — D70.1 CHEMOTHERAPY INDUCED NEUTROPENIA: ICD-10-CM

## 2018-05-17 DIAGNOSIS — C50.011 MALIGNANT NEOPLASM OF NIPPLE OF RIGHT BREAST IN FEMALE, UNSPECIFIED ESTROGEN RECEPTOR STATUS: Primary | ICD-10-CM

## 2018-05-17 DIAGNOSIS — T45.1X5A CHEMOTHERAPY INDUCED NEUTROPENIA: ICD-10-CM

## 2018-05-17 PROCEDURE — 96367 TX/PROPH/DG ADDL SEQ IV INF: CPT | Mod: PN

## 2018-05-17 PROCEDURE — 96372 THER/PROPH/DIAG INJ SC/IM: CPT | Mod: PN

## 2018-05-17 PROCEDURE — 96375 TX/PRO/DX INJ NEW DRUG ADDON: CPT | Mod: PN

## 2018-05-17 PROCEDURE — 96368 THER/DIAG CONCURRENT INF: CPT | Mod: PN

## 2018-05-17 PROCEDURE — 99999 PR PBB SHADOW E&M-EST. PATIENT-LVL III: CPT | Mod: PBBFAC,,, | Performed by: INTERNAL MEDICINE

## 2018-05-17 PROCEDURE — 99215 OFFICE O/P EST HI 40 MIN: CPT | Mod: S$GLB,,, | Performed by: INTERNAL MEDICINE

## 2018-05-17 PROCEDURE — 25000003 PHARM REV CODE 250: Mod: PN | Performed by: INTERNAL MEDICINE

## 2018-05-17 PROCEDURE — A4216 STERILE WATER/SALINE, 10 ML: HCPCS | Mod: PN | Performed by: INTERNAL MEDICINE

## 2018-05-17 PROCEDURE — S0028 INJECTION, FAMOTIDINE, 20 MG: HCPCS | Mod: PN | Performed by: INTERNAL MEDICINE

## 2018-05-17 PROCEDURE — 96413 CHEMO IV INFUSION 1 HR: CPT | Mod: PN

## 2018-05-17 PROCEDURE — 63600175 PHARM REV CODE 636 W HCPCS: Mod: TB,PN | Performed by: INTERNAL MEDICINE

## 2018-05-17 RX ORDER — POTASSIUM CHLORIDE 14.9 MG/ML
20 INJECTION INTRAVENOUS
Status: CANCELLED
Start: 2018-05-17

## 2018-05-17 RX ORDER — FAMOTIDINE 20 MG/50ML
20 INJECTION, SOLUTION INTRAVENOUS
Status: COMPLETED | OUTPATIENT
Start: 2018-05-17 | End: 2018-05-17

## 2018-05-17 RX ORDER — POTASSIUM CHLORIDE 14.9 MG/ML
20 INJECTION INTRAVENOUS
Status: COMPLETED | OUTPATIENT
Start: 2018-05-17 | End: 2018-05-17

## 2018-05-17 RX ORDER — SODIUM CHLORIDE 0.9 % (FLUSH) 0.9 %
10 SYRINGE (ML) INJECTION
Status: DISCONTINUED | OUTPATIENT
Start: 2018-05-17 | End: 2018-05-17 | Stop reason: HOSPADM

## 2018-05-17 RX ORDER — DIPHENHYDRAMINE HYDROCHLORIDE 50 MG/ML
25 INJECTION INTRAMUSCULAR; INTRAVENOUS
Status: COMPLETED | OUTPATIENT
Start: 2018-05-17 | End: 2018-05-17

## 2018-05-17 RX ADMIN — PACLITAXEL 156 MG: 6 INJECTION, SOLUTION, CONCENTRATE INTRAVENOUS at 01:05

## 2018-05-17 RX ADMIN — FAMOTIDINE 20 MG: 20 INJECTION, SOLUTION INTRAVENOUS at 12:05

## 2018-05-17 RX ADMIN — DIPHENHYDRAMINE HYDROCHLORIDE 25 MG: 50 INJECTION INTRAMUSCULAR; INTRAVENOUS at 12:05

## 2018-05-17 RX ADMIN — POTASSIUM CHLORIDE 20 MEQ: 200 INJECTION, SOLUTION INTRAVENOUS at 01:05

## 2018-05-17 RX ADMIN — DEXAMETHASONE SODIUM PHOSPHATE 10 MG: 4 INJECTION, SOLUTION INTRA-ARTICULAR; INTRALESIONAL; INTRAMUSCULAR; INTRAVENOUS; SOFT TISSUE at 12:05

## 2018-05-17 RX ADMIN — SODIUM CHLORIDE, PRESERVATIVE FREE 10 ML: 5 INJECTION INTRAVENOUS at 03:05

## 2018-05-17 RX ADMIN — ERYTHROPOIETIN 40000 UNITS: 40000 INJECTION, SOLUTION INTRAVENOUS; SUBCUTANEOUS at 12:05

## 2018-05-17 RX ADMIN — SODIUM CHLORIDE: 0.9 INJECTION, SOLUTION INTRAVENOUS at 12:05

## 2018-05-17 NOTE — PROGRESS NOTES
Ga Cross is 62 years of age  woman  for  breast cancer.start of AC chemotherapy.      She has undergone bilateral mastectomies with Dr. Cardenas.  Pathology reveals three sentinel lymph nodes on the right side, one   of which showed micrometastases size of 0.8 mm without extranodal extension.    The patient shows invasive ductal carcinoma 1.4 cm with low-grade ductal   carcinoma in situ on the right side.  On the left breast, the patient shows no   evidence of lymph node metastases on sentinel lymph node evaluation, focal   low-grade LCIS and DCIS on the left side, but no invasive component.  The   patient's invasive ductal carcinoma total size is about 2.3 cm, status post   mastectomy.  She stages as a T2 N1 MX.  The patient has the invasive ductal   carcinoma showing ER positivity, [place] positivity, and HER-2 negativity by FISH.    Has seen xrt in consult, DR. Graham  Completed AC on taxol   .  MEDICAL HISTORY:  Significant for dyslipidemia, hypertension, mitral valve   prolapse, seasonal allergies.    MEDICATIONS:  Include Norvasc, cyclobenzaprine, HydroDIURIL, ibuprofen.zofran, mouthwash, compazine prn    ALLERGIES:  She is allergic to hydromorphone and penicillin.    PHYSICAL EXAMINATION:   VITAL SIGNS:  Reveals the patient with 185 pound weight.  BSA 1.94, height of 5   feet 4 inches, no pain.  Wt Readings from Last 3 Encounters:   05/17/18 85.8 kg (189 lb 2.5 oz)   05/10/18 86.1 kg (189 lb 13.1 oz)   05/10/18 86.1 kg (189 lb 13.1 oz)     Temp Readings from Last 3 Encounters:   05/17/18 97.8 °F (36.6 °C)   05/10/18 97.7 °F (36.5 °C)   05/10/18 97.7 °F (36.5 °C)     BP Readings from Last 3 Encounters:   05/17/18 (!) 149/72   05/10/18 113/72   05/10/18 (!) 141/91     Pulse Readings from Last 3 Encounters:   05/17/18 99   05/10/18 98   05/10/18 95   GENERAL:  Awake, alert, oriented.  BREASTS:  Double mastectomy is healing well.  LUNGS:  Clear to auscultation.  No JVD.  Trachea is  central.  CVS:  S1, S2 normally heard.  No murmurs or gallops.  ABDOMEN:  Soft.  No rebound, guarding or rigidity.  EXTREMITIES:  Without edema.  NEUROLOGICAL:  The patient is appropriate.    LABS:  Labs from   Lab Results   Component Value Date    WBC 4.92 05/17/2018    HGB 9.5 (L) 05/17/2018    HCT 29.1 (L) 05/17/2018     (H) 05/17/2018     05/17/2018     CMP  Sodium   Date Value Ref Range Status   05/17/2018 140 136 - 145 mmol/L Final     Potassium   Date Value Ref Range Status   05/17/2018 3.2 (L) 3.5 - 5.1 mmol/L Final     Chloride   Date Value Ref Range Status   05/17/2018 97 95 - 110 mmol/L Final     CO2   Date Value Ref Range Status   05/17/2018 32 (H) 22 - 31 mmol/L Final     Glucose   Date Value Ref Range Status   05/17/2018 101 70 - 110 mg/dL Final     Comment:     The ADA recommends the following guidelines for fasting glucose:  Normal:       less than 100 mg/dL  Prediabetes:  100 mg/dL to 125 mg/dL  Diabetes:     126 mg/dL or higher       BUN, Bld   Date Value Ref Range Status   05/17/2018 11 7 - 18 mg/dL Final     Creatinine   Date Value Ref Range Status   05/17/2018 0.65 0.50 - 1.40 mg/dL Final     Calcium   Date Value Ref Range Status   05/17/2018 9.3 8.4 - 10.2 mg/dL Final     Total Protein   Date Value Ref Range Status   05/17/2018 6.1 6.0 - 8.4 g/dL Final     Albumin   Date Value Ref Range Status   05/17/2018 3.7 3.5 - 5.2 g/dL Final     Total Bilirubin   Date Value Ref Range Status   05/17/2018 0.7 0.2 - 1.3 mg/dL Final     Alkaline Phosphatase   Date Value Ref Range Status   05/17/2018 69 38 - 145 U/L Final     AST ([place])   Date Value Ref Range Status   01/18/2016 22 14 - 36 U/L Final     AST   Date Value Ref Range Status   05/17/2018 21 14 - 36 U/L Final     ALT   Date Value Ref Range Status   05/17/2018 38 10 - 44 U/L Final     Anion Gap   Date Value Ref Range Status   05/17/2018 11 8 - 16 mmol/L Final     eGFR if    Date Value Ref Range Status    05/17/2018 >60 >60 mL/min/1.73 m^2 Final     eGFR if non    Date Value Ref Range Status   05/17/2018 >60 >60 mL/min/1.73 m^2 Final     Comment:     Calculation used to obtain the estimated glomerular filtration  rate (eGFR) is the CKD-EPI equation.      PATHOLOGY:  As mentioned above.    IMPRESSION:  T2 N1 MX breast cancer with DCIS on left side and invasive   component on the right side.  ER/[place] positive, HER-2 negative.  PET done , neg for mets  PLAN:  Proceed with cycle #11   continue with taxol procrit for anemia from chemo weekly  Add kcl Iv        rtc 1weeks with cbc, cmp for cycle 12 /12 then xrt+arimidex will make xrt referral has been done , pt will call DR. Aguilar and schedule f/u to get started with xrt after 12th cycle , after 12th taxol rtc 3 weeks to me to initiate arimidex  cont procrit weekly   pt continues to get the expanders worked on by Dr. Alarcon

## 2018-05-17 NOTE — PLAN OF CARE
Problem: Patient Care Overview  Goal: Plan of Care Review  Outcome: Ongoing (interventions implemented as appropriate)  Pt tolerated Taxol infusion well.  Pt also received IV Potassium.  Reminded pt to continue taking her daily potassium medication.  Instructed to call MD with any problems.

## 2018-05-24 ENCOUNTER — INFUSION (OUTPATIENT)
Dept: INFUSION THERAPY | Facility: HOSPITAL | Age: 63
End: 2018-05-24
Attending: INTERNAL MEDICINE
Payer: COMMERCIAL

## 2018-05-24 ENCOUNTER — OFFICE VISIT (OUTPATIENT)
Dept: HEMATOLOGY/ONCOLOGY | Facility: CLINIC | Age: 63
End: 2018-05-24
Payer: COMMERCIAL

## 2018-05-24 VITALS
BODY MASS INDEX: 32.33 KG/M2 | DIASTOLIC BLOOD PRESSURE: 64 MMHG | WEIGHT: 189.38 LBS | HEIGHT: 64 IN | HEART RATE: 105 BPM | TEMPERATURE: 98 F | SYSTOLIC BLOOD PRESSURE: 135 MMHG | RESPIRATION RATE: 20 BRPM

## 2018-05-24 VITALS
SYSTOLIC BLOOD PRESSURE: 106 MMHG | BODY MASS INDEX: 32.33 KG/M2 | WEIGHT: 189.38 LBS | TEMPERATURE: 98 F | DIASTOLIC BLOOD PRESSURE: 64 MMHG | HEIGHT: 64 IN | HEART RATE: 84 BPM | RESPIRATION RATE: 18 BRPM

## 2018-05-24 DIAGNOSIS — C50.812 MALIGNANT NEOPLASM OF OVERLAPPING SITES OF BOTH BREASTS IN FEMALE, ESTROGEN RECEPTOR POSITIVE: Primary | ICD-10-CM

## 2018-05-24 DIAGNOSIS — D64.81 ANTINEOPLASTIC CHEMOTHERAPY INDUCED ANEMIA: ICD-10-CM

## 2018-05-24 DIAGNOSIS — D70.1 CHEMOTHERAPY INDUCED NEUTROPENIA: ICD-10-CM

## 2018-05-24 DIAGNOSIS — Z17.0 MALIGNANT NEOPLASM OF OVERLAPPING SITES OF BOTH BREASTS IN FEMALE, ESTROGEN RECEPTOR POSITIVE: ICD-10-CM

## 2018-05-24 DIAGNOSIS — E87.6 HYPOKALEMIA: ICD-10-CM

## 2018-05-24 DIAGNOSIS — C50.811 MALIGNANT NEOPLASM OF OVERLAPPING SITES OF BOTH BREASTS IN FEMALE, ESTROGEN RECEPTOR POSITIVE: Primary | ICD-10-CM

## 2018-05-24 DIAGNOSIS — T45.1X5A CHEMOTHERAPY INDUCED NEUTROPENIA: ICD-10-CM

## 2018-05-24 DIAGNOSIS — C50.811 MALIGNANT NEOPLASM OF OVERLAPPING SITES OF BOTH BREASTS IN FEMALE, ESTROGEN RECEPTOR POSITIVE: ICD-10-CM

## 2018-05-24 DIAGNOSIS — E87.6 HYPOKALEMIA: Primary | ICD-10-CM

## 2018-05-24 DIAGNOSIS — Z17.0 MALIGNANT NEOPLASM OF OVERLAPPING SITES OF BOTH BREASTS IN FEMALE, ESTROGEN RECEPTOR POSITIVE: Primary | ICD-10-CM

## 2018-05-24 DIAGNOSIS — T45.1X5A ANTINEOPLASTIC CHEMOTHERAPY INDUCED ANEMIA: ICD-10-CM

## 2018-05-24 DIAGNOSIS — C50.812 MALIGNANT NEOPLASM OF OVERLAPPING SITES OF BOTH BREASTS IN FEMALE, ESTROGEN RECEPTOR POSITIVE: ICD-10-CM

## 2018-05-24 PROCEDURE — 96372 THER/PROPH/DIAG INJ SC/IM: CPT | Mod: PN

## 2018-05-24 PROCEDURE — 99214 OFFICE O/P EST MOD 30 MIN: CPT | Mod: S$GLB,,, | Performed by: NURSE PRACTITIONER

## 2018-05-24 PROCEDURE — 96368 THER/DIAG CONCURRENT INF: CPT | Mod: PN

## 2018-05-24 PROCEDURE — 96367 TX/PROPH/DG ADDL SEQ IV INF: CPT | Mod: PN

## 2018-05-24 PROCEDURE — 99999 PR PBB SHADOW E&M-EST. PATIENT-LVL III: CPT | Mod: PBBFAC,,, | Performed by: NURSE PRACTITIONER

## 2018-05-24 PROCEDURE — 96375 TX/PRO/DX INJ NEW DRUG ADDON: CPT | Mod: PN

## 2018-05-24 PROCEDURE — 25000003 PHARM REV CODE 250: Mod: PN | Performed by: INTERNAL MEDICINE

## 2018-05-24 PROCEDURE — S0028 INJECTION, FAMOTIDINE, 20 MG: HCPCS | Mod: PN | Performed by: INTERNAL MEDICINE

## 2018-05-24 PROCEDURE — 96413 CHEMO IV INFUSION 1 HR: CPT | Mod: PN

## 2018-05-24 PROCEDURE — 63600175 PHARM REV CODE 636 W HCPCS: Mod: PN | Performed by: INTERNAL MEDICINE

## 2018-05-24 PROCEDURE — A4216 STERILE WATER/SALINE, 10 ML: HCPCS | Mod: PN | Performed by: INTERNAL MEDICINE

## 2018-05-24 PROCEDURE — 96366 THER/PROPH/DIAG IV INF ADDON: CPT | Mod: PN

## 2018-05-24 PROCEDURE — 63600175 PHARM REV CODE 636 W HCPCS: Mod: PN | Performed by: NURSE PRACTITIONER

## 2018-05-24 RX ORDER — POTASSIUM CHLORIDE 14.9 MG/ML
20 INJECTION INTRAVENOUS
Status: COMPLETED | OUTPATIENT
Start: 2018-05-24 | End: 2018-05-24

## 2018-05-24 RX ORDER — POTASSIUM CHLORIDE 14.9 MG/ML
20 INJECTION INTRAVENOUS
Status: CANCELLED
Start: 2018-05-24

## 2018-05-24 RX ORDER — FAMOTIDINE 20 MG/50ML
20 INJECTION, SOLUTION INTRAVENOUS
Status: COMPLETED | OUTPATIENT
Start: 2018-05-24 | End: 2018-05-24

## 2018-05-24 RX ORDER — SODIUM CHLORIDE 0.9 % (FLUSH) 0.9 %
10 SYRINGE (ML) INJECTION
Status: DISCONTINUED | OUTPATIENT
Start: 2018-05-24 | End: 2018-05-24 | Stop reason: HOSPADM

## 2018-05-24 RX ORDER — DIPHENHYDRAMINE HYDROCHLORIDE 50 MG/ML
25 INJECTION INTRAMUSCULAR; INTRAVENOUS
Status: COMPLETED | OUTPATIENT
Start: 2018-05-24 | End: 2018-05-24

## 2018-05-24 RX ADMIN — ERYTHROPOIETIN 40000 UNITS: 40000 INJECTION, SOLUTION INTRAVENOUS; SUBCUTANEOUS at 10:05

## 2018-05-24 RX ADMIN — FAMOTIDINE 20 MG: 20 INJECTION, SOLUTION INTRAVENOUS at 11:05

## 2018-05-24 RX ADMIN — DIPHENHYDRAMINE HYDROCHLORIDE 25 MG: 50 INJECTION INTRAMUSCULAR; INTRAVENOUS at 10:05

## 2018-05-24 RX ADMIN — SODIUM CHLORIDE: 0.9 INJECTION, SOLUTION INTRAVENOUS at 10:05

## 2018-05-24 RX ADMIN — DEXAMETHASONE SODIUM PHOSPHATE 10 MG: 4 INJECTION, SOLUTION INTRA-ARTICULAR; INTRALESIONAL; INTRAMUSCULAR; INTRAVENOUS; SOFT TISSUE at 10:05

## 2018-05-24 RX ADMIN — POTASSIUM CHLORIDE 20 MEQ: 200 INJECTION, SOLUTION INTRAVENOUS at 11:05

## 2018-05-24 RX ADMIN — PACLITAXEL 156 MG: 6 INJECTION, SOLUTION INTRAVENOUS at 11:05

## 2018-05-24 RX ADMIN — SODIUM CHLORIDE, PRESERVATIVE FREE 10 ML: 5 INJECTION INTRAVENOUS at 02:05

## 2018-05-24 NOTE — PROGRESS NOTES
HISTORY OF PRESENT ILLNESS:  This is a 64 y/o  female known to   Dr. Mcintosh for newly diagnosed breast cancer involving both breasts.  The patient   was found to have invasive ductal carcinoma 1.4 cm with low-grade ductal carcinoma   in situ on the right side. Pathology reveals three sentinel lymph nodes on the right side, one   of which showed micrometastases size of 0.8 mm without extranodal extension.    The left breast shows no evidence of lymph node metastases on sentinel lymph node   evaluation, focal low-grade LCIS and DCIS, but no invasive component.  The   patient's invasive ductal carcinoma total size is about 2.3 cm.  She stages as   a T2 N1 MX.  Invasive ductal carcinoma showing ER positivity, [place] positivity   and HER-2 negativity by FISH.  She has undergone bilateral mastectomies with   Dr. Cardenas.  She has completed 4 cycles of AC and presents to the clinic today   for evaluation prior to Week #12 of planned 12 infusions of Taxol.  She is excited   that this is her last day of chemotherapy.  No other new complaints   or pertinent findings on a 14 point review of systems.    PHYSICAL EXAMINATION:   GENERAL:  WDWNWF in NAD, Alert & oriented x 3.  VITAL SIGNS:  Weight:  Stable.  Wt Readings from Last 3 Encounters:   05/24/18 85.9 kg (189 lb 6 oz)   05/17/18 85.8 kg (189 lb 2.5 oz)   05/17/18 85.8 kg (189 lb 2.5 oz)     Temp Readings from Last 3 Encounters:   05/24/18 98.1 °F (36.7 °C)   05/17/18 97.8 °F (36.6 °C)   05/17/18 97.8 °F (36.6 °C)     BP Readings from Last 3 Encounters:   05/24/18 135/64   05/17/18 102/63   05/17/18 (!) 149/72     Pulse Readings from Last 3 Encounters:   05/24/18 105   05/17/18 95   05/17/18 99     LUNGS:  Clear to auscultation.  No JVD.  Trachea is central.  NL respiratory effort.  CVS:  S1, S2 normally heard.  No murmurs or gallops.  ABDOMEN:  Soft.  NT/ND, +BS X 4.  EXTREMITIES:  Mild pretibial to ankle edema bilateral, distal pulses present.  NEUROLOGICAL:  The  patient is appropriate.  SKIN:  Mild macular rash to upper & lower extremities.  Nailbeds darkened.    LABS:  Labs from   Lab Results   Component Value Date    WBC 3.99 05/23/2018    HGB 9.9 (L) 05/23/2018    HCT 30.0 (L) 05/23/2018     (H) 05/23/2018     05/23/2018     Differential remarkable for 75% grans, 11% lymph    CMP  Sodium   Date Value Ref Range Status   05/23/2018 137 136 - 145 mmol/L Final     Potassium   Date Value Ref Range Status   05/23/2018 3.3 (L) 3.5 - 5.1 mmol/L Final     Chloride   Date Value Ref Range Status   05/23/2018 96 95 - 110 mmol/L Final     CO2   Date Value Ref Range Status   05/23/2018 31 22 - 31 mmol/L Final     Glucose   Date Value Ref Range Status   05/23/2018 101 70 - 110 mg/dL Final     Comment:     The ADA recommends the following guidelines for fasting glucose:  Normal:       less than 100 mg/dL  Prediabetes:  100 mg/dL to 125 mg/dL  Diabetes:     126 mg/dL or higher       BUN, Bld   Date Value Ref Range Status   05/23/2018 12 7 - 18 mg/dL Final     Creatinine   Date Value Ref Range Status   05/23/2018 0.62 0.50 - 1.40 mg/dL Final     Calcium   Date Value Ref Range Status   05/23/2018 9.5 8.4 - 10.2 mg/dL Final     Total Protein   Date Value Ref Range Status   05/23/2018 6.4 6.0 - 8.4 g/dL Final     Albumin   Date Value Ref Range Status   05/23/2018 4.2 3.5 - 5.2 g/dL Final     Total Bilirubin   Date Value Ref Range Status   05/23/2018 0.9 0.2 - 1.3 mg/dL Final     Alkaline Phosphatase   Date Value Ref Range Status   05/23/2018 69 38 - 145 U/L Final     AST ([place])   Date Value Ref Range Status   01/18/2016 22 14 - 36 U/L Final     AST   Date Value Ref Range Status   05/23/2018 20 14 - 36 U/L Final     ALT   Date Value Ref Range Status   05/23/2018 41 10 - 44 U/L Final     Anion Gap   Date Value Ref Range Status   05/23/2018 10 8 - 16 mmol/L Final     eGFR if    Date Value Ref Range Status   05/23/2018 >60 >60 mL/min/1.73 m^2 Final      eGFR if non    Date Value Ref Range Status   05/23/2018 >60 >60 mL/min/1.73 m^2 Final     Comment:     Calculation used to obtain the estimated glomerular filtration  rate (eGFR) is the CKD-EPI equation.        IMPRESSION:    1.  T2 N1 MX breast cancer with DCIS on left side and invasive component on the right side.  ER/NM positive, HER-2 negative.  2.  Hypokalemia - compliant with potassium at 20 meq bid; K-rider 20 meq today  3.  Chemotherapy associated anemia - repeat Procrit 40,000 units SQ today    PLAN:    1.  Proceed with Week #12/12 of Taxol dosed at 80 mg/m2 (156 mg) with premedications of Benadryl, Dexamethasone and Pepcid.  2.  Return to clinic in 3 weeks with interval CBC, CMP, LDH, MG for evaluation prior to start of   Arimidex; patient to follow up in [facility]  3.  Procrit 40,000 units SQ today for palliation of anemia.    Assessment/plan reviewed and approved by Dr. Vuong.

## 2018-06-04 ENCOUNTER — TELEPHONE (OUTPATIENT)
Dept: HEMATOLOGY/ONCOLOGY | Facility: CLINIC | Age: 63
End: 2018-06-04

## 2018-06-04 NOTE — TELEPHONE ENCOUNTER
----- Message from Marcela Epstein sent at 6/4/2018 12:04 PM CDT -----  Contact: [phone]  Patient is returning nurse's phone call.  Please call patient back at [phone].

## 2018-06-04 NOTE — TELEPHONE ENCOUNTER
Pt returned call. Pt stated she did not hit or cut her finger. Pt stated her finger really started to change to colors on Friday 6/1/18. Pt states now her nail and finger in the front has changed to green but she has redness extending to her knuckle. ZORA Osorio advised.    Pt to go to Urgent Care to get on abx asap for infection. Pt verbalized understanding. Pt will call back to advise of urgent care visit.

## 2018-06-04 NOTE — TELEPHONE ENCOUNTER
Attempted to contact pt in regards to her right middle finger and nail. Left message for a return call with number [phone].

## 2018-06-14 ENCOUNTER — OFFICE VISIT (OUTPATIENT)
Dept: HEMATOLOGY/ONCOLOGY | Facility: CLINIC | Age: 63
End: 2018-06-14
Payer: COMMERCIAL

## 2018-06-14 VITALS
DIASTOLIC BLOOD PRESSURE: 79 MMHG | HEART RATE: 100 BPM | WEIGHT: 189.13 LBS | TEMPERATURE: 99 F | HEIGHT: 64 IN | RESPIRATION RATE: 20 BRPM | BODY MASS INDEX: 32.29 KG/M2 | SYSTOLIC BLOOD PRESSURE: 121 MMHG

## 2018-06-14 DIAGNOSIS — E78.00 PURE HYPERCHOLESTEROLEMIA: ICD-10-CM

## 2018-06-14 DIAGNOSIS — C50.011 MALIGNANT NEOPLASM OF NIPPLE OF RIGHT BREAST IN FEMALE, UNSPECIFIED ESTROGEN RECEPTOR STATUS: Primary | ICD-10-CM

## 2018-06-14 DIAGNOSIS — Z17.0 MALIGNANT NEOPLASM OF OVERLAPPING SITES OF BOTH BREASTS IN FEMALE, ESTROGEN RECEPTOR POSITIVE: ICD-10-CM

## 2018-06-14 DIAGNOSIS — E87.6 HYPOKALEMIA: ICD-10-CM

## 2018-06-14 DIAGNOSIS — C50.811 MALIGNANT NEOPLASM OF OVERLAPPING SITES OF BOTH BREASTS IN FEMALE, ESTROGEN RECEPTOR POSITIVE: ICD-10-CM

## 2018-06-14 DIAGNOSIS — C50.812 MALIGNANT NEOPLASM OF OVERLAPPING SITES OF BOTH BREASTS IN FEMALE, ESTROGEN RECEPTOR POSITIVE: ICD-10-CM

## 2018-06-14 PROCEDURE — 99214 OFFICE O/P EST MOD 30 MIN: CPT | Mod: S$GLB,,, | Performed by: INTERNAL MEDICINE

## 2018-06-14 PROCEDURE — 99999 PR PBB SHADOW E&M-EST. PATIENT-LVL III: CPT | Mod: PBBFAC,,, | Performed by: INTERNAL MEDICINE

## 2018-06-14 RX ORDER — ANASTROZOLE 1 MG/1
1 TABLET ORAL DAILY
Qty: 30 TABLET | Refills: 2 | Status: SHIPPED | OUTPATIENT
Start: 2018-06-14 | End: 2018-07-20 | Stop reason: SDUPTHER

## 2018-06-14 NOTE — PROGRESS NOTES
Ga Cross is 62 years of age  woman  for  breast cancer.start of AC chemotherapy.      She has undergone bilateral mastectomies with Dr. Cardenas.  Pathology reveals three sentinel lymph nodes on the right side, one   of which showed micrometastases size of 0.8 mm without extranodal extension.    The patient shows invasive ductal carcinoma 1.4 cm with low-grade ductal   carcinoma in situ on the right side.  On the left breast, the patient shows no   evidence of lymph node metastases on sentinel lymph node evaluation, focal   low-grade LCIS and DCIS on the left side, but no invasive component.  The   patient's invasive ductal carcinoma total size is about 2.3 cm, status post   mastectomy.  She stages as a T2 N1 MX.  The patient has the invasive ductal   carcinoma showing ER positivity, UT positivity, and HER-2 negativity by FISH.    Has seen xrt in consult, DR. Graham  Completed AC  X 4 then 12 weeks of taxol, ( 5/24/2018) awaiting f/u with DR. Laura, feeling better has appt tomorrow, pt is feeling she shouldn't get xrt.   will start arimidex today.  MEDICAL HISTORY:  Significant for dyslipidemia, hypertension, mitral valve   prolapse, seasonal allergies.    MEDICATIONS:  Include Norvasc, cyclobenzaprine, HydroDIURIL, ibuprofen.zofran, mouthwash, compazine prn    ALLERGIES:  She is allergic to hydromorphone and penicillin.    PHYSICAL EXAMINATION:   VITAL SIGNS:  Reveals the patient with 185 pound weight.  BSA 1.94, height of 5   feet 4 inches, no pain.  Wt Readings from Last 3 Encounters:   06/14/18 85.8 kg (189 lb 2.5 oz)   06/04/18 86.3 kg (190 lb 4.1 oz)   05/24/18 85.9 kg (189 lb 6 oz)     Temp Readings from Last 3 Encounters:   06/14/18 98.7 °F (37.1 °C)   06/04/18 98.8 °F (37.1 °C) (Oral)   05/24/18 98.1 °F (36.7 °C)     BP Readings from Last 3 Encounters:   06/14/18 121/79   06/04/18 (!) 141/76   05/24/18 106/64     Pulse Readings from Last 3 Encounters:   06/14/18 100   06/04/18 108    05/24/18 84   GENERAL:  Awake, alert, oriented.  BREASTS:  Double mastectomy is healing well.  LUNGS:  Clear to auscultation.  No JVD.  Trachea is central.  CVS:  S1, S2 normally heard.  No murmurs or gallops.  ABDOMEN:  Soft.  No rebound, guarding or rigidity.  EXTREMITIES:  Without edema.  NEUROLOGICAL:  The patient is appropriate.    LABS:  Labs from   Lab Results   Component Value Date    WBC 7.64 06/12/2018    HGB 11.0 (L) 06/12/2018    HCT 33.8 (L) 06/12/2018     (H) 06/12/2018     (H) 06/12/2018     CMP  Sodium   Date Value Ref Range Status   06/12/2018 138 136 - 145 mmol/L Final     Potassium   Date Value Ref Range Status   06/12/2018 3.3 (L) 3.5 - 5.1 mmol/L Final     Chloride   Date Value Ref Range Status   06/12/2018 97 95 - 110 mmol/L Final     CO2   Date Value Ref Range Status   06/12/2018 30 22 - 31 mmol/L Final     Glucose   Date Value Ref Range Status   06/12/2018 116 (H) 70 - 110 mg/dL Final     Comment:     The ADA recommends the following guidelines for fasting glucose:  Normal:       less than 100 mg/dL  Prediabetes:  100 mg/dL to 125 mg/dL  Diabetes:     126 mg/dL or higher       BUN, Bld   Date Value Ref Range Status   06/12/2018 16 7 - 18 mg/dL Final     Creatinine   Date Value Ref Range Status   06/12/2018 0.83 0.50 - 1.40 mg/dL Final     Calcium   Date Value Ref Range Status   06/12/2018 9.4 8.4 - 10.2 mg/dL Final     Total Protein   Date Value Ref Range Status   06/12/2018 6.2 6.0 - 8.4 g/dL Final     Albumin   Date Value Ref Range Status   06/12/2018 3.8 3.5 - 5.2 g/dL Final     Total Bilirubin   Date Value Ref Range Status   06/12/2018 0.3 0.2 - 1.3 mg/dL Final     Alkaline Phosphatase   Date Value Ref Range Status   06/12/2018 66 38 - 145 U/L Final     AST ([place])   Date Value Ref Range Status   01/18/2016 22 14 - 36 U/L Final     AST   Date Value Ref Range Status   06/12/2018 18 14 - 36 U/L Final     ALT   Date Value Ref Range Status   06/12/2018 29 10 - 44 U/L  Final     Anion Gap   Date Value Ref Range Status   06/12/2018 11 8 - 16 mmol/L Final     eGFR if    Date Value Ref Range Status   06/12/2018 >60 >60 mL/min/1.73 m^2 Final     eGFR if non    Date Value Ref Range Status   06/12/2018 >60 >60 mL/min/1.73 m^2 Final     Comment:     Calculation used to obtain the estimated glomerular filtration  rate (eGFR) is the CKD-EPI equation.      PATHOLOGY:  As mentioned above.    IMPRESSION:  T2 N1 MX breast cancer with DCIS on left side and invasive   component on the right side.  ER/NE positive, HER-2 negative.  PET done , neg for mets  PLAN:     cont supplemneting k   start arimidex daily   get echo   keep xrt appt to discuss if xrt needed   rtc 6 weeks to assess s/e of arimidex

## 2018-06-19 ENCOUNTER — TELEPHONE (OUTPATIENT)
Dept: HEMATOLOGY/ONCOLOGY | Facility: CLINIC | Age: 63
End: 2018-06-19

## 2018-06-19 ENCOUNTER — PATIENT MESSAGE (OUTPATIENT)
Dept: HEMATOLOGY/ONCOLOGY | Facility: CLINIC | Age: 63
End: 2018-06-19

## 2018-06-26 ENCOUNTER — PATIENT MESSAGE (OUTPATIENT)
Dept: HEMATOLOGY/ONCOLOGY | Facility: CLINIC | Age: 63
End: 2018-06-26

## 2018-07-13 PROBLEM — T85.49XA BREAST IMPLANT DEFLATION: Status: ACTIVE | Noted: 2018-07-13

## 2018-07-20 ENCOUNTER — OFFICE VISIT (OUTPATIENT)
Dept: HEMATOLOGY/ONCOLOGY | Facility: CLINIC | Age: 63
End: 2018-07-20
Payer: COMMERCIAL

## 2018-07-20 VITALS
WEIGHT: 185.19 LBS | RESPIRATION RATE: 20 BRPM | TEMPERATURE: 98 F | DIASTOLIC BLOOD PRESSURE: 74 MMHG | SYSTOLIC BLOOD PRESSURE: 140 MMHG | BODY MASS INDEX: 31.62 KG/M2 | HEART RATE: 98 BPM | HEIGHT: 64 IN

## 2018-07-20 DIAGNOSIS — C50.011 MALIGNANT NEOPLASM OF NIPPLE OF RIGHT BREAST IN FEMALE, UNSPECIFIED ESTROGEN RECEPTOR STATUS: ICD-10-CM

## 2018-07-20 DIAGNOSIS — Z79.811 AROMATASE INHIBITOR USE: Primary | ICD-10-CM

## 2018-07-20 DIAGNOSIS — C50.812 MALIGNANT NEOPLASM OF OVERLAPPING SITES OF BOTH BREASTS IN FEMALE, ESTROGEN RECEPTOR POSITIVE: ICD-10-CM

## 2018-07-20 DIAGNOSIS — E78.00 PURE HYPERCHOLESTEROLEMIA: ICD-10-CM

## 2018-07-20 DIAGNOSIS — Z17.0 MALIGNANT NEOPLASM OF OVERLAPPING SITES OF BOTH BREASTS IN FEMALE, ESTROGEN RECEPTOR POSITIVE: ICD-10-CM

## 2018-07-20 DIAGNOSIS — C50.811 MALIGNANT NEOPLASM OF OVERLAPPING SITES OF BOTH BREASTS IN FEMALE, ESTROGEN RECEPTOR POSITIVE: ICD-10-CM

## 2018-07-20 PROCEDURE — 99214 OFFICE O/P EST MOD 30 MIN: CPT | Mod: S$GLB,,, | Performed by: INTERNAL MEDICINE

## 2018-07-20 PROCEDURE — 99999 PR PBB SHADOW E&M-EST. PATIENT-LVL IV: CPT | Mod: PBBFAC,,, | Performed by: INTERNAL MEDICINE

## 2018-07-20 RX ORDER — ANASTROZOLE 1 MG/1
1 TABLET ORAL DAILY
Qty: 90 TABLET | Refills: 6 | Status: SHIPPED | OUTPATIENT
Start: 2018-07-20 | End: 2018-10-16 | Stop reason: SDUPTHER

## 2018-07-20 NOTE — PROGRESS NOTES
Ga Cross is 62 years of age  woman  for  breast cancer.start of AC chemotherapy.      She has undergone bilateral mastectomies with Dr. Cardenas.  Pathology reveals three sentinel lymph nodes on the right side, one   of which showed micrometastases size of 0.8 mm without extranodal extension.    The patient shows invasive ductal carcinoma 1.4 cm with low-grade ductal   carcinoma in situ on the right side.  On the left breast, the patient shows no   evidence of lymph node metastases on sentinel lymph node evaluation, focal   low-grade LCIS and DCIS on the left side, but no invasive component.  The   patient's invasive ductal carcinoma total size is about 2.3 cm, status post   mastectomy.  She stages as a T2 N1 MX.  The patient has the invasive ductal   carcinoma showing ER positivity, MN positivity, and HER-2 negativity by FISH.    Has seen xrt in consult, DR. Graham  Completed AC  X 4 then 12 weeks of taxol, ( 5/24/2018) awaiting f/u with DR. Laura, feeling better has appt tomorrow, pt is feeling she shouldn't get xrt.   will start arimidex today. Pt has expanders now and sis still working with it.  Sees Dr Alarcon  MEDICAL HISTORY:  Significant for dyslipidemia, hypertension, mitral valve   prolapse, seasonal allergies.    MEDICATIONS:  Include Norvasc, cyclobenzaprine, HydroDIURIL, ibuprofen.zofran, mouthwash, compazine prn    ALLERGIES:  She is allergic to hydromorphone and penicillin.    PHYSICAL EXAMINATION:   VITAL SIGNS:  Reveals the patient with 185 pound weight.  BSA 1.94, height of 5   feet 4 inches, no pain.  Wt Readings from Last 3 Encounters:   07/20/18 84 kg (185 lb 3 oz)   07/11/18 85.8 kg (189 lb 2.5 oz)   06/14/18 85.8 kg (189 lb 2.5 oz)     Temp Readings from Last 3 Encounters:   07/20/18 97.8 °F (36.6 °C) (Oral)   07/13/18 97 °F (36.1 °C) (Skin)   07/11/18 96.8 °F (36 °C) (Oral)     BP Readings from Last 3 Encounters:   07/20/18 (!) 140/74   07/13/18 122/70   07/11/18 (!)  143/79     Pulse Readings from Last 3 Encounters:   07/20/18 98   07/13/18 80   07/11/18 88   GENERAL:  Awake, alert, oriented.  BREASTS:  Double mastectomy is healing well.  LUNGS:  Clear to auscultation.  No JVD.  Trachea is central.  CVS:  S1, S2 normally heard.  No murmurs or gallops.  ABDOMEN:  Soft.  No rebound, guarding or rigidity.  EXTREMITIES:  Without edema.  NEUROLOGICAL:  The patient is appropriate.    LABS:  Labs from   Lab Results   Component Value Date    WBC 7.15 07/11/2018    HGB 12.6 07/11/2018    HCT 38.8 07/11/2018    MCV 96 07/11/2018     07/11/2018     CMP  Sodium   Date Value Ref Range Status   07/11/2018 142 136 - 145 mmol/L Final     Potassium   Date Value Ref Range Status   07/11/2018 3.5 3.5 - 5.1 mmol/L Final     Chloride   Date Value Ref Range Status   07/11/2018 98 95 - 110 mmol/L Final     CO2   Date Value Ref Range Status   07/11/2018 33 (H) 22 - 31 mmol/L Final     Glucose   Date Value Ref Range Status   07/11/2018 122 (H) 70 - 110 mg/dL Final     Comment:     The ADA recommends the following guidelines for fasting glucose:  Normal:       less than 100 mg/dL  Prediabetes:  100 mg/dL to 125 mg/dL  Diabetes:     126 mg/dL or higher       BUN, Bld   Date Value Ref Range Status   07/11/2018 15 7 - 18 mg/dL Final     Creatinine   Date Value Ref Range Status   07/11/2018 0.62 0.50 - 1.40 mg/dL Final     Calcium   Date Value Ref Range Status   07/11/2018 9.6 8.4 - 10.2 mg/dL Final     Total Protein   Date Value Ref Range Status   06/15/2018 6.7 6.0 - 8.4 g/dL Final     Albumin   Date Value Ref Range Status   06/15/2018 4.1 3.5 - 5.2 g/dL Final     Total Bilirubin   Date Value Ref Range Status   06/15/2018 0.4 0.2 - 1.3 mg/dL Final     Alkaline Phosphatase   Date Value Ref Range Status   06/15/2018 64 38 - 145 U/L Final     AST ([place])   Date Value Ref Range Status   01/18/2016 22 14 - 36 U/L Final     AST   Date Value Ref Range Status   06/15/2018 24 14 - 36 U/L Final      ALT   Date Value Ref Range Status   06/15/2018 29 10 - 44 U/L Final     Anion Gap   Date Value Ref Range Status   07/11/2018 11 8 - 16 mmol/L Final     eGFR if    Date Value Ref Range Status   07/11/2018 >60 >60 mL/min/1.73 m^2 Final     eGFR if non    Date Value Ref Range Status   07/11/2018 >60 >60 mL/min/1.73 m^2 Final     Comment:     Calculation used to obtain the estimated glomerular filtration  rate (eGFR) is the CKD-EPI equation.      PATHOLOGY:  As mentioned above.  Echo done in 6/2018    IMPRESSION:  T2 N1 MX breast cancer with DCIS on left side and invasive   component on the right side.  ER/GA positive, HER-2 negative. Completed AC then weekly T     will get port out sees DR. Alejandro  PET done , neg for mets ( 1/2018)  PLAN:     cont supplementing k   on arimidex daily    Pt saw DR Plascencia and decided not to go through xrt   therefore PET will be due now, will get pet  And another pet in 6 months will cbc, [id number] cmp   also need to get bone density now -phrev

## 2018-07-23 ENCOUNTER — PATIENT MESSAGE (OUTPATIENT)
Dept: HEMATOLOGY/ONCOLOGY | Facility: CLINIC | Age: 63
End: 2018-07-23

## 2018-07-31 ENCOUNTER — HOSPITAL ENCOUNTER (OUTPATIENT)
Dept: RADIOLOGY | Facility: HOSPITAL | Age: 63
Discharge: HOME | End: 2018-07-31
Attending: INTERNAL MEDICINE
Payer: COMMERCIAL

## 2018-07-31 DIAGNOSIS — C50.011 MALIGNANT NEOPLASM OF NIPPLE OF RIGHT BREAST IN FEMALE, UNSPECIFIED ESTROGEN RECEPTOR STATUS: ICD-10-CM

## 2018-07-31 PROCEDURE — 78815 PET IMAGE W/CT SKULL-THIGH: CPT | Mod: 26,PS,, | Performed by: RADIOLOGY

## 2018-07-31 PROCEDURE — A9552 F18 FDG: HCPCS | Mod: PO

## 2018-07-31 PROCEDURE — 78815 PET IMAGE W/CT SKULL-THIGH: CPT | Mod: TC,PO

## 2018-08-10 DIAGNOSIS — C50.011 MALIGNANT NEOPLASM OF NIPPLE OF RIGHT BREAST IN FEMALE, UNSPECIFIED ESTROGEN RECEPTOR STATUS: ICD-10-CM

## 2018-08-10 PROBLEM — Z95.828 PORT CATHETER IN PLACE: Status: ACTIVE | Noted: 2018-08-10

## 2018-08-10 RX ORDER — ANASTROZOLE 1 MG/1
TABLET ORAL
Qty: 30 TABLET | Refills: 6 | Status: SHIPPED | OUTPATIENT
Start: 2018-08-10 | End: 2019-01-14 | Stop reason: SDUPTHER

## 2018-08-22 RX ORDER — SODIUM CHLORIDE 0.9 % (FLUSH) 0.9 %
10 SYRINGE (ML) INJECTION
Status: CANCELLED | OUTPATIENT
Start: 2018-08-22

## 2018-08-22 RX ORDER — HEPARIN 100 UNIT/ML
500 SYRINGE INTRAVENOUS
Status: CANCELLED | OUTPATIENT
Start: 2018-08-22

## 2018-10-18 PROBLEM — Z98.890 S/P BREAST RECONSTRUCTION: Status: ACTIVE | Noted: 2018-10-18

## 2018-10-29 ENCOUNTER — PATIENT MESSAGE (OUTPATIENT)
Dept: HEMATOLOGY/ONCOLOGY | Facility: CLINIC | Age: 63
End: 2018-10-29

## 2018-11-01 ENCOUNTER — OFFICE VISIT (OUTPATIENT)
Dept: HEMATOLOGY/ONCOLOGY | Facility: CLINIC | Age: 63
End: 2018-11-01
Payer: COMMERCIAL

## 2018-11-01 VITALS
DIASTOLIC BLOOD PRESSURE: 79 MMHG | TEMPERATURE: 99 F | RESPIRATION RATE: 18 BRPM | WEIGHT: 190.25 LBS | BODY MASS INDEX: 32.48 KG/M2 | SYSTOLIC BLOOD PRESSURE: 130 MMHG | HEIGHT: 64 IN | HEART RATE: 81 BPM

## 2018-11-01 DIAGNOSIS — C50.812 MALIGNANT NEOPLASM OF OVERLAPPING SITES OF BOTH BREASTS IN FEMALE, ESTROGEN RECEPTOR POSITIVE: ICD-10-CM

## 2018-11-01 DIAGNOSIS — Z17.0 MALIGNANT NEOPLASM OF OVERLAPPING SITES OF BOTH BREASTS IN FEMALE, ESTROGEN RECEPTOR POSITIVE: ICD-10-CM

## 2018-11-01 DIAGNOSIS — I82.90 THROMBOSIS: Primary | ICD-10-CM

## 2018-11-01 DIAGNOSIS — C50.811 MALIGNANT NEOPLASM OF OVERLAPPING SITES OF BOTH BREASTS IN FEMALE, ESTROGEN RECEPTOR POSITIVE: ICD-10-CM

## 2018-11-01 DIAGNOSIS — Z98.890 S/P BREAST RECONSTRUCTION: ICD-10-CM

## 2018-11-01 DIAGNOSIS — E78.00 PURE HYPERCHOLESTEROLEMIA: ICD-10-CM

## 2018-11-01 DIAGNOSIS — I82.612 SUPERFICIAL VENOUS THROMBOSIS OF LEFT ARM: ICD-10-CM

## 2018-11-01 PROCEDURE — 99214 OFFICE O/P EST MOD 30 MIN: CPT | Mod: S$GLB,,, | Performed by: INTERNAL MEDICINE

## 2018-11-01 PROCEDURE — 99999 PR PBB SHADOW E&M-EST. PATIENT-LVL IV: CPT | Mod: PBBFAC,,, | Performed by: INTERNAL MEDICINE

## 2018-11-01 NOTE — PROGRESS NOTES
Ga Cross is 62 years of age  woman  for  breast cancer.start of AC chemotherapy.      She has undergone bilateral mastectomies with Dr. Cardenas.  Pathology reveals three sentinel lymph nodes on the right side, one   of which showed micrometastases size of 0.8 mm without extranodal extension.    The patient shows invasive ductal carcinoma 1.4 cm with low-grade ductal   carcinoma in situ on the right side.  On the left breast, the patient shows no   evidence of lymph node metastases on sentinel lymph node evaluation, focal   low-grade LCIS and DCIS on the left side, but no invasive component.  The   patient's invasive ductal carcinoma total size is about 2.3 cm, status post   mastectomy.  She stages as a T2 N1 MX.  The patient has the invasive ductal   carcinoma showing ER positivity, NV positivity, and HER-2 negativity by FISH.    Has seen xrt in consult, DR. Graham decided against XRT Completed AC  X 4 then 12 weeks of taxol, ( 5/24/2018) MEDICAL HISTORY:  Significant for dyslipidemia, hypertension, mitral valve   prolapse, seasonal allergies. She has been undergoing reconstruction with Dr. Alarcon however had left arm swelling with IV was recently diagnosed with superficial thrombosis of her left arm started on Eliquis.  Swelling has gone down markedly now    MEDICATIONS:  Include Norvasc, cyclobenzaprine, HydroDIURIL, ibuprofen.zofran, mouthwash, compazine prn    ALLERGIES:  She is allergic to hydromorphone and penicillin.    PHYSICAL EXAMINATION:   VITAL SIGNS:  Reveals the patient with 185 pound weight.  BSA 1.94, height of 5   feet 4 inches, no pain.  Wt Readings from Last 3 Encounters:   10/24/18 84.9 kg (187 lb 2.7 oz)   10/18/18 82.6 kg (182 lb 1.6 oz)   08/10/18 83.9 kg (185 lb)     Temp Readings from Last 3 Encounters:   10/24/18 98.8 °F (37.1 °C) (Oral)   10/18/18 97.5 °F (36.4 °C) (Oral)   07/20/18 97.8 °F (36.6 °C) (Oral)     BP Readings from Last 3 Encounters:   10/24/18 126/74    10/18/18 (!) 148/70   08/10/18 (!) 142/88     Pulse Readings from Last 3 Encounters:   10/24/18 94   10/18/18 90   08/10/18 88   GENERAL:  Awake, alert, oriented.  BREASTS:  Double mastectomy is healing well.  LUNGS:  Clear to auscultation.  No JVD.  Trachea is central.  CVS:  S1, S2 normally heard.  No murmurs or gallops.  ABDOMEN:  Soft.  No rebound, guarding or rigidity.  EXTREMITIES:  Without edema.  NEUROLOGICAL:  The patient is appropriate. Left arm almost back to normal size.    LABS:  Labs from   Lab Results   Component Value Date    WBC 7.52 07/23/2018    HGB 12.7 07/23/2018    HCT 38.2 07/23/2018    MCV 93 07/23/2018     07/23/2018     CMP  Sodium   Date Value Ref Range Status   10/18/2018 139 136 - 145 mmol/L Final     Potassium   Date Value Ref Range Status   10/18/2018 4.2 3.5 - 5.1 mmol/L Final     Comment:     Specimen slightly hemolyzed     Chloride   Date Value Ref Range Status   10/18/2018 104 95 - 110 mmol/L Final     CO2   Date Value Ref Range Status   10/18/2018 27 22 - 31 mmol/L Final     Glucose   Date Value Ref Range Status   10/18/2018 103 70 - 110 mg/dL Final     Comment:     The ADA recommends the following guidelines for fasting glucose:  Normal:       less than 100 mg/dL  Prediabetes:  100 mg/dL to 125 mg/dL  Diabetes:     126 mg/dL or higher       BUN, Bld   Date Value Ref Range Status   10/18/2018 20 (H) 7 - 18 mg/dL Final     Creatinine   Date Value Ref Range Status   10/18/2018 0.66 0.50 - 1.40 mg/dL Final     Calcium   Date Value Ref Range Status   10/18/2018 9.4 8.4 - 10.2 mg/dL Final     Total Protein   Date Value Ref Range Status   07/23/2018 7.1 6.0 - 8.4 g/dL Final     Albumin   Date Value Ref Range Status   07/23/2018 4.2 3.5 - 5.2 g/dL Final     Total Bilirubin   Date Value Ref Range Status   07/23/2018 0.4 0.2 - 1.3 mg/dL Final     Alkaline Phosphatase   Date Value Ref Range Status   07/23/2018 72 38 - 145 U/L Final     AST ([place])   Date Value Ref Range  Status   01/18/2016 22 14 - 36 U/L Final     AST   Date Value Ref Range Status   07/23/2018 21 14 - 36 U/L Final     ALT   Date Value Ref Range Status   07/23/2018 29 10 - 44 U/L Final     Anion Gap   Date Value Ref Range Status   10/18/2018 8 8 - 16 mmol/L Final     eGFR if    Date Value Ref Range Status   10/18/2018 >60 >60 mL/min/1.73 m^2 Final     eGFR if non    Date Value Ref Range Status   10/18/2018 >60 >60 mL/min/1.73 m^2 Final     Comment:     Calculation used to obtain the estimated glomerular filtration  rate (eGFR) is the CKD-EPI equation.      PATHOLOGY:  As mentioned above.  Echo done in 6/2018  Other findings: None.      Impression  Venous ultrasound October 2018       Thrombus in the basilic, radial and ulnar veins.       IMPRESSION:  T2 N1 MX breast cancer with DCIS on left side and invasive   component on the right side.  ER/OH positive, HER-2 negative. Completed AC then weekly T  on arimidex daily.    Decided not to go through XRT  New onset superficial thrombosis of left arm on Eliquis  Completing reconstruction with Dr. Alarcon.   has scheduled appointment in February with CBC CMP CA 2729 PET scan will obtain ultrasound of left arm at that time she will continue Eliquis up until then refills sent to pharmacy

## 2018-11-07 ENCOUNTER — TELEPHONE (OUTPATIENT)
Dept: HEMATOLOGY/ONCOLOGY | Facility: CLINIC | Age: 63
End: 2018-11-07

## 2018-11-07 DIAGNOSIS — I82.90 THROMBOSIS: Primary | ICD-10-CM

## 2018-11-21 ENCOUNTER — PATIENT MESSAGE (OUTPATIENT)
Dept: HEMATOLOGY/ONCOLOGY | Facility: CLINIC | Age: 63
End: 2018-11-21

## 2018-11-21 ENCOUNTER — TELEPHONE (OUTPATIENT)
Dept: HEMATOLOGY/ONCOLOGY | Facility: CLINIC | Age: 63
End: 2018-11-21

## 2018-11-21 DIAGNOSIS — I82.401 ACUTE DEEP VEIN THROMBOSIS (DVT) OF RIGHT LOWER EXTREMITY, UNSPECIFIED VEIN: Primary | ICD-10-CM

## 2018-11-21 DIAGNOSIS — I82.90 THROMBOSIS: ICD-10-CM

## 2018-11-21 NOTE — TELEPHONE ENCOUNTER
----- Message from Sy Mcintosh sent at 11/21/2018  9:11 AM CST -----  Contact: marivel with PH  Type:  Pharmacy Calling to Clarify an RX    Name of Caller:  marivel  Pharmacy Name:  [facility]  Prescription Name:  apixaban (ELIQUIS) 5 mg Tab starter pack  What do they need to clarify?:  Pt has already taken it and needs cont dose  Best Call Back Number:  [phone]  Additional Information:

## 2018-11-21 NOTE — TELEPHONE ENCOUNTER
Spoke with [facility] pharmacy and found out. that the issue was resovled spoke with marivel.

## 2019-01-01 NOTE — TELEPHONE ENCOUNTER
----- Message from Homa Fallon sent at 6/4/2018 10:56 AM CDT -----  Contact: Vilma  Patient is calling to state middle finger right hand from knuckle up is green/black; under fingernail is black; swollen and painful. Asking if needs Rx or to be seen. Please call [phone]. Thanks!    54.5

## 2019-02-05 ENCOUNTER — HOSPITAL ENCOUNTER (OUTPATIENT)
Dept: RADIOLOGY | Facility: HOSPITAL | Age: 64
Discharge: HOME | End: 2019-02-05
Attending: INTERNAL MEDICINE
Payer: COMMERCIAL

## 2019-02-05 DIAGNOSIS — Z79.811 AROMATASE INHIBITOR USE: ICD-10-CM

## 2019-02-05 DIAGNOSIS — C50.011 MALIGNANT NEOPLASM OF NIPPLE OF RIGHT BREAST IN FEMALE, UNSPECIFIED ESTROGEN RECEPTOR STATUS: ICD-10-CM

## 2019-02-05 PROBLEM — E87.6 HYPOKALEMIA: Status: RESOLVED | Noted: 2018-04-05 | Resolved: 2019-02-05

## 2019-02-05 PROCEDURE — 78815 PET IMAGE W/CT SKULL-THIGH: CPT | Mod: 26,PS,, | Performed by: RADIOLOGY

## 2019-02-05 PROCEDURE — 78815 PET IMAGE W/CT SKULL-THIGH: CPT | Mod: TC,PO

## 2019-02-05 PROCEDURE — A9552 F18 FDG: HCPCS | Mod: PO

## 2019-02-05 PROCEDURE — 78815 NM PET CT ROUTINE: ICD-10-PCS | Mod: 26,PS,, | Performed by: RADIOLOGY

## 2019-02-06 ENCOUNTER — PATIENT MESSAGE (OUTPATIENT)
Dept: HEMATOLOGY/ONCOLOGY | Facility: CLINIC | Age: 64
End: 2019-02-06

## 2019-02-06 DIAGNOSIS — C50.819 OVERLAPPING MALIGNANT NEOPLASM OF FEMALE BREAST, UNSPECIFIED ESTROGEN RECEPTOR STATUS, UNSPECIFIED LATERALITY: Primary | ICD-10-CM

## 2019-02-08 ENCOUNTER — OFFICE VISIT (OUTPATIENT)
Dept: HEMATOLOGY/ONCOLOGY | Facility: CLINIC | Age: 64
End: 2019-02-08
Payer: COMMERCIAL

## 2019-02-08 VITALS
HEIGHT: 64 IN | RESPIRATION RATE: 20 BRPM | HEART RATE: 84 BPM | BODY MASS INDEX: 31.32 KG/M2 | WEIGHT: 183.44 LBS | DIASTOLIC BLOOD PRESSURE: 75 MMHG | TEMPERATURE: 98 F | SYSTOLIC BLOOD PRESSURE: 156 MMHG

## 2019-02-08 DIAGNOSIS — M81.8 OTHER OSTEOPOROSIS WITHOUT CURRENT PATHOLOGICAL FRACTURE: ICD-10-CM

## 2019-02-08 DIAGNOSIS — I10 BENIGN ESSENTIAL HTN: Primary | ICD-10-CM

## 2019-02-08 DIAGNOSIS — I82.612 SUPERFICIAL VENOUS THROMBOSIS OF LEFT ARM: ICD-10-CM

## 2019-02-08 PROBLEM — M81.0 OSTEOPOROSIS WITHOUT CURRENT PATHOLOGICAL FRACTURE: Status: ACTIVE | Noted: 2019-02-08

## 2019-02-08 PROCEDURE — 99214 PR OFFICE/OUTPT VISIT, EST, LEVL IV, 30-39 MIN: ICD-10-PCS | Mod: S$GLB,,, | Performed by: INTERNAL MEDICINE

## 2019-02-08 PROCEDURE — 99214 OFFICE O/P EST MOD 30 MIN: CPT | Mod: S$GLB,,, | Performed by: INTERNAL MEDICINE

## 2019-02-08 PROCEDURE — 99999 PR PBB SHADOW E&M-EST. PATIENT-LVL IV: ICD-10-PCS | Mod: PBBFAC,,, | Performed by: INTERNAL MEDICINE

## 2019-02-08 PROCEDURE — 99999 PR PBB SHADOW E&M-EST. PATIENT-LVL IV: CPT | Mod: PBBFAC,,, | Performed by: INTERNAL MEDICINE

## 2019-02-08 NOTE — PROGRESS NOTES
Ga Cross is 63 years of age  woman  for  breast cancer.s/p AC followed by T chemotherapy.      She has undergone bilateral mastectomies with Dr. Cardenas.  Pathology reveals three sentinel lymph nodes on the right side, one   of which showed micrometastases size of 0.8 mm without extranodal extension.    The patient shows invasive ductal carcinoma 1.4 cm with low-grade ductal   carcinoma in situ on the right side.  On the left breast, the patient shows no   evidence of lymph node metastases on sentinel lymph node evaluation, focal   low-grade LCIS and DCIS on the left side, but no invasive component.  The   patient's invasive ductal carcinoma total size is about 2.3 cm, status post   mastectomy.  She stages as a T2 N1 MX.  The patient has the invasive ductal   carcinoma showing ER positivity, [place] positivity, and HER-2 negativity by FISH.    Has seen xrt in consult, DR. Graham decided against XRT Completed AC  X 4 then 12 weeks of taxol, ( 5/24/2018)     MEDICAL HISTORY:  Significant for dyslipidemia, hypertension, mitral valve   prolapse, seasonal allergies. She has been undergoing reconstruction with Dr. Alarcon however had left arm swelling with IV was recently diagnosed with superficial thrombosis of her left arm started on Eliquis.  Swelling has gone down markedly now    MEDICATIONS:  Include Norvasc, cyclobenzaprine, HydroDIURIL, ibuprofen.zofran, mouthwash, compazine prn    ALLERGIES:  She is allergic to hydromorphone and penicillin.    PHYSICAL EXAMINATION:   VITAL SIGNS:  Reveals the patient with 185 pound weight.  BSA 1.94, height of 5   feet 4 inches, no pain.  Wt Readings from Last 3 Encounters:   02/08/19 83.2 kg (183 lb 6.8 oz)   02/05/19 83.9 kg (185 lb)   01/14/19 85.7 kg (189 lb)     Temp Readings from Last 3 Encounters:   02/08/19 98.4 °F (36.9 °C)   02/05/19 98 °F (36.7 °C)   01/14/19 97 °F (36.1 °C)     BP Readings from Last 3 Encounters:   02/08/19 (!) 156/75   02/05/19 122/80    01/14/19 138/80     Pulse Readings from Last 3 Encounters:   02/08/19 84   02/05/19 92   01/14/19 88   GENERAL:  Awake, alert, oriented.  BREASTS:  Double mastectomy is healing well.  LUNGS:  Clear to auscultation.  No JVD.  Trachea is central.  CVS:  S1, S2 normally heard.  No murmurs or gallops.  ABDOMEN:  Soft.  No rebound, guarding or rigidity.  EXTREMITIES:  Without edema.  NEUROLOGICAL:  The patient is appropriate. Left arm almost back to normal size.    LABS:  Labs from   Lab Results   Component Value Date    WBC 3.22 (L) 02/06/2019    HGB 14.1 02/06/2019    HCT 41.4 02/06/2019    MCV 88 02/06/2019     02/06/2019     CMP  Sodium   Date Value Ref Range Status   02/06/2019 140 136 - 145 mmol/L Final     Potassium   Date Value Ref Range Status   02/06/2019 3.9 3.5 - 5.1 mmol/L Final     Chloride   Date Value Ref Range Status   02/06/2019 101 95 - 110 mmol/L Final     CO2   Date Value Ref Range Status   02/06/2019 30 22 - 31 mmol/L Final     Glucose   Date Value Ref Range Status   02/06/2019 98 70 - 110 mg/dL Final     Comment:     The ADA recommends the following guidelines for fasting glucose:  Normal:       less than 100 mg/dL  Prediabetes:  100 mg/dL to 125 mg/dL  Diabetes:     126 mg/dL or higher       BUN, Bld   Date Value Ref Range Status   02/06/2019 13 7 - 18 mg/dL Final     Creatinine   Date Value Ref Range Status   02/06/2019 0.70 0.50 - 1.40 mg/dL Final     Calcium   Date Value Ref Range Status   02/06/2019 9.2 8.4 - 10.2 mg/dL Final     Total Protein   Date Value Ref Range Status   02/06/2019 6.8 6.0 - 8.4 g/dL Final     Albumin   Date Value Ref Range Status   02/06/2019 3.9 3.5 - 5.2 g/dL Final     Total Bilirubin   Date Value Ref Range Status   02/06/2019 0.5 0.2 - 1.3 mg/dL Final     Alkaline Phosphatase   Date Value Ref Range Status   02/06/2019 73 38 - 145 U/L Final     AST ([place])   Date Value Ref Range Status   01/18/2016 22 14 - 36 U/L Final     AST   Date Value Ref Range  Status   02/06/2019 33 14 - 36 U/L Final     ALT   Date Value Ref Range Status   02/06/2019 36 10 - 44 U/L Final     Anion Gap   Date Value Ref Range Status   02/06/2019 9 8 - 16 mmol/L Final     eGFR if    Date Value Ref Range Status   02/06/2019 >60 >60 mL/min/1.73 m^2 Final     eGFR if non    Date Value Ref Range Status   02/06/2019 >60 >60 mL/min/1.73 m^2 Final     Comment:     Calculation used to obtain the estimated glomerular filtration  rate (eGFR) is the CKD-EPI equation.      PATHOLOGY:  As mentioned above.  Echo done in 6/2018  Other findings: None.      Impression  Venous ultrasound October 2018       Thrombus in the basilic, radial and ulnar veins.     Impression 2/2019 pet       1. Lymph nodes are noted bilaterally within the cervical chains slightly more evident than on the prior examination with mild elevation in SUV that is indeterminate by SUV at 3.5 maximal.  There is also significant uptake in the pharyngeal region diffusely.  While nonspecific these changes are most likely reactive or infectious, please correlate for a pharyngitis.  The metabolic activity appears particularly centered around the lymphatic/tonsillar regions.  Sources for increased size and metabolic activity include infectious, inflammatory, and neoplastic etiologies.  Direct visualization in pharynx and vallecula anterior to the epiglottis may be of use.  Please clinically correlate.  2. Hypermetabolic activity is noted adjacent to the axillary tail of each breast left more noticeable than right with SUV that is indeterminate for neoplastic disease.  This is most likely scar tissue from recent augmentation and postsurgical change.  This could obscure underlying abnormalities.         IMPRESSION:  T2 N1 MX breast cancer with DCIS on left side and invasive   component on the right side.  ER/AL positive, HER-2 negative. Completed AC then weekly T  on arimidex daily.    Decided not to go through  XRT  New onset superficial thrombosis of left arm on Eliquis  Completing reconstruction with Dr. Alarcon.  rtc 6 months with CBC CMP CA 2729 PET scan will obtain ultrasound of left arm at that time she will continue Eliquis up until then refills sent to pharmacy

## 2019-02-12 ENCOUNTER — DOCUMENTATION ONLY (OUTPATIENT)
Dept: INFUSION THERAPY | Facility: HOSPITAL | Age: 64
End: 2019-02-12

## 2019-02-12 NOTE — PROGRESS NOTES
PEE Bartlett MA             When you and the patient would like to start. Dr morgan will follow up with patient prior to next treatment    Previous Messages      ----- Message -----   From: Ching Sams MA   Sent: 2/12/2019   3:29 PM   To: Florecita Le LPN     Patient is approved for prolia when do you want me to schedule not sure if she needs to see md first     ----- Message -----   From: Florecita Le LPN   Sent: 2/11/2019  10:46 AM   To: Ching Sams MA     Please let me know when patient is approved for prolia and can be scheduled so that I may be able to schedule follow up appropriately.     Thanks for the Help.

## 2019-02-13 ENCOUNTER — INFUSION (OUTPATIENT)
Dept: INFUSION THERAPY | Facility: HOSPITAL | Age: 64
End: 2019-02-13
Attending: INTERNAL MEDICINE
Payer: COMMERCIAL

## 2019-02-13 VITALS — RESPIRATION RATE: 20 BRPM | DIASTOLIC BLOOD PRESSURE: 91 MMHG | SYSTOLIC BLOOD PRESSURE: 142 MMHG | HEART RATE: 92 BPM

## 2019-02-13 DIAGNOSIS — Z95.828 PORT-A-CATH IN PLACE: ICD-10-CM

## 2019-02-13 DIAGNOSIS — M81.8 OTHER OSTEOPOROSIS WITHOUT CURRENT PATHOLOGICAL FRACTURE: Primary | ICD-10-CM

## 2019-02-13 PROCEDURE — 96372 THER/PROPH/DIAG INJ SC/IM: CPT | Mod: PN

## 2019-02-13 PROCEDURE — 63600175 PHARM REV CODE 636 W HCPCS: Mod: JG,PN | Performed by: INTERNAL MEDICINE

## 2019-02-13 RX ADMIN — DENOSUMAB 60 MG: 60 INJECTION SUBCUTANEOUS at 08:02

## 2019-06-17 ENCOUNTER — PATIENT MESSAGE (OUTPATIENT)
Dept: HEMATOLOGY/ONCOLOGY | Facility: CLINIC | Age: 64
End: 2019-06-17

## 2019-06-26 PROBLEM — Z78.9 IMPAIRED MOBILITY AND ACTIVITIES OF DAILY LIVING: Status: ACTIVE | Noted: 2019-06-26

## 2019-06-26 PROBLEM — M25.512 SEVERE PAIN OF LEFT SHOULDER: Status: ACTIVE | Noted: 2019-06-26

## 2019-06-26 PROBLEM — R29.898 SHOULDER WEAKNESS: Status: ACTIVE | Noted: 2019-06-26

## 2019-06-26 PROBLEM — Z74.09 IMPAIRED MOBILITY AND ACTIVITIES OF DAILY LIVING: Status: ACTIVE | Noted: 2019-06-26

## 2019-06-28 PROBLEM — M75.32 CALCIFIC TENDINITIS OF LEFT SHOULDER: Status: ACTIVE | Noted: 2019-06-28

## 2019-06-28 PROBLEM — S46.819A STRAIN OF DELTOID MUSCLE: Status: ACTIVE | Noted: 2019-06-28

## 2019-06-28 PROBLEM — M75.102 ROTATOR CUFF SYNDROME OF LEFT SHOULDER: Status: ACTIVE | Noted: 2019-06-28

## 2019-08-06 ENCOUNTER — HOSPITAL ENCOUNTER (OUTPATIENT)
Dept: RADIOLOGY | Facility: HOSPITAL | Age: 64
Discharge: HOME | End: 2019-08-06
Attending: INTERNAL MEDICINE
Payer: COMMERCIAL

## 2019-08-06 DIAGNOSIS — M81.8 OTHER OSTEOPOROSIS WITHOUT CURRENT PATHOLOGICAL FRACTURE: ICD-10-CM

## 2019-08-06 PROCEDURE — 78815 PET IMAGE W/CT SKULL-THIGH: CPT | Mod: TC,PO

## 2019-08-06 PROCEDURE — A9552 F18 FDG: HCPCS | Mod: PO

## 2019-08-06 PROCEDURE — 78815 PET IMAGE W/CT SKULL-THIGH: CPT | Mod: 26,PS,, | Performed by: RADIOLOGY

## 2019-08-06 PROCEDURE — 78815 NM PET CT ROUTINE: ICD-10-PCS | Mod: 26,PS,, | Performed by: RADIOLOGY

## 2019-08-15 ENCOUNTER — OFFICE VISIT (OUTPATIENT)
Dept: HEMATOLOGY/ONCOLOGY | Facility: CLINIC | Age: 64
End: 2019-08-15
Payer: COMMERCIAL

## 2019-08-15 ENCOUNTER — INFUSION (OUTPATIENT)
Dept: INFUSION THERAPY | Facility: HOSPITAL | Age: 64
End: 2019-08-15
Attending: INTERNAL MEDICINE
Payer: COMMERCIAL

## 2019-08-15 VITALS
DIASTOLIC BLOOD PRESSURE: 74 MMHG | RESPIRATION RATE: 18 BRPM | SYSTOLIC BLOOD PRESSURE: 116 MMHG | TEMPERATURE: 99 F | HEART RATE: 92 BPM

## 2019-08-15 VITALS
WEIGHT: 195.31 LBS | SYSTOLIC BLOOD PRESSURE: 116 MMHG | RESPIRATION RATE: 18 BRPM | OXYGEN SATURATION: 96 % | TEMPERATURE: 99 F | HEART RATE: 92 BPM | BODY MASS INDEX: 33.34 KG/M2 | HEIGHT: 64 IN | DIASTOLIC BLOOD PRESSURE: 74 MMHG

## 2019-08-15 DIAGNOSIS — M81.0 AGE-RELATED OSTEOPOROSIS WITHOUT CURRENT PATHOLOGICAL FRACTURE: ICD-10-CM

## 2019-08-15 DIAGNOSIS — Z95.828 PORT-A-CATH IN PLACE: ICD-10-CM

## 2019-08-15 DIAGNOSIS — E78.00 PURE HYPERCHOLESTEROLEMIA: ICD-10-CM

## 2019-08-15 DIAGNOSIS — M81.8 OTHER OSTEOPOROSIS WITHOUT CURRENT PATHOLOGICAL FRACTURE: Primary | ICD-10-CM

## 2019-08-15 DIAGNOSIS — Z17.0 MALIGNANT NEOPLASM OF OVERLAPPING SITES OF BOTH BREASTS IN FEMALE, ESTROGEN RECEPTOR POSITIVE: Primary | ICD-10-CM

## 2019-08-15 DIAGNOSIS — C50.811 MALIGNANT NEOPLASM OF OVERLAPPING SITES OF BOTH BREASTS IN FEMALE, ESTROGEN RECEPTOR POSITIVE: Primary | ICD-10-CM

## 2019-08-15 DIAGNOSIS — I10 ESSENTIAL HYPERTENSION: ICD-10-CM

## 2019-08-15 DIAGNOSIS — C50.812 MALIGNANT NEOPLASM OF OVERLAPPING SITES OF BOTH BREASTS IN FEMALE, ESTROGEN RECEPTOR POSITIVE: Primary | ICD-10-CM

## 2019-08-15 PROCEDURE — 99999 PR PBB SHADOW E&M-EST. PATIENT-LVL IV: CPT | Mod: PBBFAC,,, | Performed by: INTERNAL MEDICINE

## 2019-08-15 PROCEDURE — 99214 PR OFFICE/OUTPT VISIT, EST, LEVL IV, 30-39 MIN: ICD-10-PCS | Mod: S$GLB,,, | Performed by: INTERNAL MEDICINE

## 2019-08-15 PROCEDURE — 99214 OFFICE O/P EST MOD 30 MIN: CPT | Mod: S$GLB,,, | Performed by: INTERNAL MEDICINE

## 2019-08-15 PROCEDURE — 96372 THER/PROPH/DIAG INJ SC/IM: CPT | Mod: PN

## 2019-08-15 PROCEDURE — 99999 PR PBB SHADOW E&M-EST. PATIENT-LVL IV: ICD-10-PCS | Mod: PBBFAC,,, | Performed by: INTERNAL MEDICINE

## 2019-08-15 PROCEDURE — 99497 PR ADVNCD CARE PLAN 30 MIN: ICD-10-PCS | Mod: S$GLB,,, | Performed by: INTERNAL MEDICINE

## 2019-08-15 PROCEDURE — 63600175 PHARM REV CODE 636 W HCPCS: Mod: JG,PN | Performed by: INTERNAL MEDICINE

## 2019-08-15 PROCEDURE — 99497 ADVNCD CARE PLAN 30 MIN: CPT | Mod: S$GLB,,, | Performed by: INTERNAL MEDICINE

## 2019-08-15 RX ADMIN — DENOSUMAB 60 MG: 60 INJECTION SUBCUTANEOUS at 02:08

## 2019-08-15 NOTE — PROGRESS NOTES
Ga Cross is 64 years of age  woman  for  breast cancer.s/p AC followed by T chemotherapy.      She has undergone bilateral mastectomies with Dr. Cardenas.  Pathology reveals three sentinel lymph nodes on the right side, one   of which showed micrometastases size of 0.8 mm without extranodal extension.    The patient shows invasive ductal carcinoma 1.4 cm with low-grade ductal   carcinoma in situ on the right side.  On the left breast, the patient shows no   evidence of lymph node metastases on sentinel lymph node evaluation, focal   low-grade LCIS and DCIS on the left side, but no invasive component.  The   patient's invasive ductal carcinoma total size is about 2.3 cm, status post   mastectomy.  She stages as a T2 N1 MX.  The patient has the invasive ductal   carcinoma showing ER positivity, [place] positivity, and HER-2 negativity by FISH.    Has seen xrt in consult, DR. Graham decided against XRT Completed AC  X 4 then 12 weeks of taxol, ( 5/24/2018)   Lots of pain on arimidex, had a shot and feels better  MEDICAL HISTORY:  Significant for dyslipidemia, hypertension, mitral valve   prolapse, seasonal allergies. She has completed reconstruction with Dr. Alarcon however had left arm swelling with IV , diagnosed with superficial thrombosis of her left arm started was on Eliquis.  Now off.    MEDICATIONS:  Include Norvasc, cyclobenzaprine, HydroDIURIL, ibuprofen.zofran, mouthwash, compazine prn    ALLERGIES:  She is allergic to hydromorphone and penicillin.    PHYSICAL EXAMINATION:   VITAL SIGNS:  Reveals the patient with 185 pound weight.  BSA 1.94, height of 5   feet 4 inches, no pain.  Wt Readings from Last 3 Encounters:   08/15/19 88.6 kg (195 lb 5.2 oz)   06/28/19 86.2 kg (190 lb)   06/07/19 87.5 kg (193 lb)     Temp Readings from Last 3 Encounters:   08/15/19 98.5 °F (36.9 °C) (Oral)   06/07/19 98.1 °F (36.7 °C) (Oral)   02/08/19 98.4 °F (36.9 °C)     BP Readings from Last 3 Encounters:    08/15/19 116/74   06/28/19 (!) 141/71   06/07/19 128/76     Pulse Readings from Last 3 Encounters:   08/15/19 92   06/28/19 93   06/07/19 90   GENERAL:  Awake, alert, oriented.  BREASTS:  Double mastectomy is healing well.  LUNGS:  Clear to auscultation.  No JVD.  Trachea is central.  CVS:  S1, S2 normally heard.  No murmurs or gallops.  ABDOMEN:  Soft.  No rebound, guarding or rigidity.  EXTREMITIES:  Without edema.  NEUROLOGICAL:  The patient is appropriate. Left arm almost back to normal size.    LABS:  Labs from   Lab Results   Component Value Date    WBC 5.94 08/06/2019    HGB 14.3 08/06/2019    HCT 42.2 08/06/2019    MCV 91 08/06/2019     08/06/2019     CMP  Sodium   Date Value Ref Range Status   08/06/2019 140 136 - 145 mmol/L Final     Potassium   Date Value Ref Range Status   08/06/2019 3.8 3.5 - 5.1 mmol/L Final     Chloride   Date Value Ref Range Status   08/06/2019 102 95 - 110 mmol/L Final     CO2   Date Value Ref Range Status   08/06/2019 30 22 - 31 mmol/L Final     Glucose   Date Value Ref Range Status   08/06/2019 100 70 - 110 mg/dL Final     Comment:     The ADA recommends the following guidelines for fasting glucose:  Normal:       less than 100 mg/dL  Prediabetes:  100 mg/dL to 125 mg/dL  Diabetes:     126 mg/dL or higher       BUN, Bld   Date Value Ref Range Status   08/06/2019 16 7 - 18 mg/dL Final     Creatinine   Date Value Ref Range Status   08/06/2019 0.66 0.50 - 1.40 mg/dL Final     Calcium   Date Value Ref Range Status   08/06/2019 10.0 8.4 - 10.2 mg/dL Final     Total Protein   Date Value Ref Range Status   08/06/2019 7.1 6.0 - 8.4 g/dL Final     Albumin   Date Value Ref Range Status   08/06/2019 4.3 3.5 - 5.2 g/dL Final     Total Bilirubin   Date Value Ref Range Status   08/06/2019 0.6 0.2 - 1.3 mg/dL Final     Alkaline Phosphatase   Date Value Ref Range Status   08/06/2019 61 38 - 145 U/L Final     AST ([place])   Date Value Ref Range Status   01/18/2016 22 14 - 36 U/L  Final     AST   Date Value Ref Range Status   08/06/2019 28 14 - 36 U/L Final     ALT   Date Value Ref Range Status   08/06/2019 36 10 - 44 U/L Final     Anion Gap   Date Value Ref Range Status   08/06/2019 8 8 - 16 mmol/L Final     eGFR if    Date Value Ref Range Status   08/06/2019 >60 >60 mL/min/1.73 m^2 Final     eGFR if non    Date Value Ref Range Status   08/06/2019 >60 >60 mL/min/1.73 m^2 Final     Comment:     Calculation used to obtain the estimated glomerular filtration  rate (eGFR) is the CKD-EPI equation.      PATHOLOGY:  As mentioned above.  Echo done in 6/2018  Other findings: None.      Impression  Venous ultrasound October 2018       Thrombus in the basilic, radial and ulnar veins.   PET 8/2019: no mets  [id number] 19.7  Left arm : recent usg no dvt  IMPRESSION:  T2 N1 MX breast cancer with DCIS on left side and invasive   component on the right side.  ER/CA positive, HER-2 negative. Completed AC then weekly T  on arimidex daily.    Decided not to go through XRT    Completing reconstruction with Dr. Alarcon.  rtc 6 months with CBC CMP CA 2729 PET scan   Rpt usg was neg for DVT on left arm so  eliquis was discontd   ok for prolia   cont calcium  Advance Care Planning     Living Will  During this visit, I engaged the pt in the advance care planning process.  The patient and I reviewed the role for advance directives and their purpose in directing future healthcare if the patient's unable to speak for herself.  At this point in time, the patient does have full decision-making capacity.  We discussed different extreme health states that she could experience, and reviewed what kind of medical care she would want in those situations.  The pt communicated that if she were comatose and had little chance of a meaningful recovery, she would not want machines/life-sustaining treatments used. . I spent a total of 25  minutes engaging the patient in this advance care planning  discussion.her  will be making decisions on her behalf

## 2019-08-28 RX ORDER — POTASSIUM CHLORIDE 20 MEQ/1
20 TABLET, EXTENDED RELEASE ORAL DAILY
Qty: 90 TABLET | Refills: 3 | OUTPATIENT
Start: 2019-08-28 | End: 2020-08-27

## 2019-08-28 NOTE — TELEPHONE ENCOUNTER
Patient is requesting refill for Pot Chlor 20 meq ER Tab to be sent to [facility].  I have been asked by our NP to forward to you as patient is under your care for HTN.    Thank You

## 2019-09-04 ENCOUNTER — PATIENT MESSAGE (OUTPATIENT)
Dept: HEMATOLOGY/ONCOLOGY | Facility: CLINIC | Age: 64
End: 2019-09-04

## 2019-09-04 DIAGNOSIS — C50.011 MALIGNANT NEOPLASM OF NIPPLE OF RIGHT BREAST IN FEMALE, UNSPECIFIED ESTROGEN RECEPTOR STATUS: ICD-10-CM

## 2019-09-04 DIAGNOSIS — E87.5 HYPERKALEMIA: Primary | ICD-10-CM

## 2019-09-04 RX ORDER — ANASTROZOLE 1 MG/1
TABLET ORAL
Qty: 30 TABLET | Refills: 6 | Status: SHIPPED | OUTPATIENT
Start: 2019-09-04 | End: 2020-02-26

## 2019-09-04 RX ORDER — POTASSIUM CHLORIDE 20 MEQ/1
20 TABLET, EXTENDED RELEASE ORAL 2 TIMES DAILY
Qty: 180 TABLET | Refills: 1 | Status: SHIPPED | OUTPATIENT
Start: 2019-09-04 | End: 2020-06-22

## 2020-02-04 ENCOUNTER — HOSPITAL ENCOUNTER (OUTPATIENT)
Dept: RADIOLOGY | Facility: HOSPITAL | Age: 65
Discharge: HOME | End: 2020-02-04
Attending: INTERNAL MEDICINE
Payer: COMMERCIAL

## 2020-02-04 DIAGNOSIS — C50.812 MALIGNANT NEOPLASM OF OVERLAPPING SITES OF BOTH BREASTS IN FEMALE, ESTROGEN RECEPTOR POSITIVE: ICD-10-CM

## 2020-02-04 DIAGNOSIS — Z17.0 MALIGNANT NEOPLASM OF OVERLAPPING SITES OF BOTH BREASTS IN FEMALE, ESTROGEN RECEPTOR POSITIVE: ICD-10-CM

## 2020-02-04 DIAGNOSIS — C50.811 MALIGNANT NEOPLASM OF OVERLAPPING SITES OF BOTH BREASTS IN FEMALE, ESTROGEN RECEPTOR POSITIVE: ICD-10-CM

## 2020-02-04 PROCEDURE — 78815 PET IMAGE W/CT SKULL-THIGH: CPT | Mod: TC,PO

## 2020-02-04 PROCEDURE — A9552 F18 FDG: HCPCS | Mod: PO

## 2020-02-04 PROCEDURE — 78815 PET IMAGE W/CT SKULL-THIGH: CPT | Mod: 26,PS,, | Performed by: RADIOLOGY

## 2020-02-04 PROCEDURE — 78815 NM PET CT ROUTINE: ICD-10-PCS | Mod: 26,PS,, | Performed by: RADIOLOGY

## 2020-02-06 ENCOUNTER — PATIENT MESSAGE (OUTPATIENT)
Dept: HEMATOLOGY/ONCOLOGY | Facility: CLINIC | Age: 65
End: 2020-02-06

## 2020-02-06 ENCOUNTER — TELEPHONE (OUTPATIENT)
Dept: HEMATOLOGY/ONCOLOGY | Facility: CLINIC | Age: 65
End: 2020-02-06

## 2020-02-06 NOTE — TELEPHONE ENCOUNTER
Spoke with pt to r/s her appt on 2/13/20 with Dr Mcintosh. Pt was r/s to 3/5/20 @ 1pm per request.

## 2020-02-06 NOTE — TELEPHONE ENCOUNTER
----- Message from Krystin Arias sent at 2/6/2020  2:59 PM CST -----  Contact: self  Type:  Patient Returning Call    Who Called:  self  Who Left Message for Patient:  Mark  Does the patient know what this is regarding?:  yes  Best Call Back Number:  [phone] (home) [phone] (work)  Additional Information:  Patient states she needs to rescheduled the appointment on 02/13/20. Thanks!

## 2020-02-25 DIAGNOSIS — C50.011 MALIGNANT NEOPLASM OF NIPPLE OF RIGHT BREAST IN FEMALE, UNSPECIFIED ESTROGEN RECEPTOR STATUS: ICD-10-CM

## 2020-02-26 RX ORDER — ANASTROZOLE 1 MG/1
TABLET ORAL
Qty: 30 TABLET | Refills: 11 | Status: SHIPPED | OUTPATIENT
Start: 2020-02-26 | End: 2020-08-05 | Stop reason: SDUPTHER

## 2020-02-28 ENCOUNTER — TELEPHONE (OUTPATIENT)
Dept: HEMATOLOGY/ONCOLOGY | Facility: CLINIC | Age: 65
End: 2020-02-28

## 2020-02-28 NOTE — TELEPHONE ENCOUNTER
Please advise:    Good morning,     We are currently working on the prior authorization for Prolia. Last year [facility] did not require an auth for Prolia but as of 1/01/2020 it requires an authorization now.  Are there any contraindications where the patient cannot take oral bisphosphonates? When she send to the medical director for review she will note that patient has already received 1 dose.       Thank you,   Destinee Ryan RN   Clinical Review Team   PreService

## 2020-03-05 ENCOUNTER — INFUSION (OUTPATIENT)
Dept: INFUSION THERAPY | Facility: HOSPITAL | Age: 65
End: 2020-03-05
Attending: INTERNAL MEDICINE
Payer: COMMERCIAL

## 2020-03-05 ENCOUNTER — OFFICE VISIT (OUTPATIENT)
Dept: HEMATOLOGY/ONCOLOGY | Facility: CLINIC | Age: 65
End: 2020-03-05
Payer: COMMERCIAL

## 2020-03-05 VITALS
OXYGEN SATURATION: 96 % | SYSTOLIC BLOOD PRESSURE: 133 MMHG | HEIGHT: 64 IN | DIASTOLIC BLOOD PRESSURE: 78 MMHG | WEIGHT: 199.31 LBS | BODY MASS INDEX: 34.03 KG/M2 | RESPIRATION RATE: 16 BRPM | TEMPERATURE: 98 F | HEART RATE: 97 BPM

## 2020-03-05 VITALS — HEART RATE: 90 BPM | SYSTOLIC BLOOD PRESSURE: 122 MMHG | RESPIRATION RATE: 16 BRPM | DIASTOLIC BLOOD PRESSURE: 75 MMHG

## 2020-03-05 DIAGNOSIS — E78.00 PURE HYPERCHOLESTEROLEMIA: ICD-10-CM

## 2020-03-05 DIAGNOSIS — I10 ESSENTIAL HYPERTENSION: ICD-10-CM

## 2020-03-05 DIAGNOSIS — Z95.828 PORT-A-CATH IN PLACE: ICD-10-CM

## 2020-03-05 DIAGNOSIS — C50.811 MALIGNANT NEOPLASM OF OVERLAPPING SITES OF BOTH BREASTS IN FEMALE, ESTROGEN RECEPTOR POSITIVE: Primary | ICD-10-CM

## 2020-03-05 DIAGNOSIS — Z17.0 MALIGNANT NEOPLASM OF OVERLAPPING SITES OF BOTH BREASTS IN FEMALE, ESTROGEN RECEPTOR POSITIVE: Primary | ICD-10-CM

## 2020-03-05 DIAGNOSIS — M81.8 OTHER OSTEOPOROSIS WITHOUT CURRENT PATHOLOGICAL FRACTURE: Primary | ICD-10-CM

## 2020-03-05 DIAGNOSIS — M81.0 AGE-RELATED OSTEOPOROSIS WITHOUT CURRENT PATHOLOGICAL FRACTURE: ICD-10-CM

## 2020-03-05 DIAGNOSIS — C50.812 MALIGNANT NEOPLASM OF OVERLAPPING SITES OF BOTH BREASTS IN FEMALE, ESTROGEN RECEPTOR POSITIVE: Primary | ICD-10-CM

## 2020-03-05 PROCEDURE — 96372 THER/PROPH/DIAG INJ SC/IM: CPT | Mod: PN

## 2020-03-05 PROCEDURE — 99214 OFFICE O/P EST MOD 30 MIN: CPT | Mod: S$GLB,,, | Performed by: INTERNAL MEDICINE

## 2020-03-05 PROCEDURE — 99999 PR PBB SHADOW E&M-EST. PATIENT-LVL IV: ICD-10-PCS | Mod: PBBFAC,,, | Performed by: INTERNAL MEDICINE

## 2020-03-05 PROCEDURE — 99214 PR OFFICE/OUTPT VISIT, EST, LEVL IV, 30-39 MIN: ICD-10-PCS | Mod: S$GLB,,, | Performed by: INTERNAL MEDICINE

## 2020-03-05 PROCEDURE — 63600175 PHARM REV CODE 636 W HCPCS: Mod: JG,PN | Performed by: INTERNAL MEDICINE

## 2020-03-05 PROCEDURE — 99999 PR PBB SHADOW E&M-EST. PATIENT-LVL IV: CPT | Mod: PBBFAC,,, | Performed by: INTERNAL MEDICINE

## 2020-03-05 RX ADMIN — DENOSUMAB 60 MG: 60 INJECTION SUBCUTANEOUS at 01:03

## 2020-03-05 NOTE — PROGRESS NOTES
Ga Cross is 64 years of age  woman  for  breast cancer.s/p AC followed by T chemotherapy.      She has undergone bilateral mastectomies with Dr. Cardenas.  Pathology reveals three sentinel lymph nodes on the right side, one   of which showed micrometastases size of 0.8 mm without extranodal extension.    The patient shows invasive ductal carcinoma 1.4 cm with low-grade ductal   carcinoma in situ on the right side.  On the left breast, the patient shows no   evidence of lymph node metastases on sentinel lymph node evaluation, focal   low-grade LCIS and DCIS on the left side, but no invasive component.  The   patient's invasive ductal carcinoma total size is about 2.3 cm, status post   mastectomy.  She stages as a T2 N1 MX.  The patient has the invasive ductal   carcinoma showing ER positivity, [place] positivity, and HER-2 negativity by FISH.    Has seen xrt in consult, DR. Graham decided against XRT Completed AC  X 4 then 12 weeks of taxol, ( 5/24/2018)   Lots of pain on arimidex, had a shot and feels better  MEDICAL HISTORY:  Significant for dyslipidemia, hypertension, mitral valve   prolapse, seasonal allergies. She has completed reconstruction with Dr. Alarcon however had left arm swelling with IV , diagnosed with superficial thrombosis of her left arm started was on Eliquis.  Now off.    MEDICATIONS:  Include Norvasc, cyclobenzaprine, HydroDIURIL, ibuprofen.zofran, mouthwash, compazine prn    ALLERGIES:  She is allergic to hydromorphone and penicillin.    PHYSICAL EXAMINATION:   VITAL SIGNS:  Reveals the patient with 185 pound weight.  BSA 1.94, height of 5   feet 4 inches, no pain.  Wt Readings from Last 3 Encounters:   03/05/20 90.4 kg (199 lb 4.7 oz)   08/15/19 88.6 kg (195 lb 5.2 oz)   06/28/19 86.2 kg (190 lb)     Temp Readings from Last 3 Encounters:   03/05/20 98.2 °F (36.8 °C) (Oral)   08/15/19 98.5 °F (36.9 °C)   08/15/19 98.5 °F (36.9 °C) (Oral)     BP Readings from Last 3 Encounters:    03/05/20 133/78   08/15/19 116/74   08/15/19 116/74     Pulse Readings from Last 3 Encounters:   03/05/20 97   08/15/19 92   08/15/19 92   GENERAL:  Awake, alert, oriented.  BREASTS:  Double mastectomy is healing well.  LUNGS:  Clear to auscultation.  No JVD.  Trachea is central.  CVS:  S1, S2 normally heard.  No murmurs or gallops.  ABDOMEN:  Soft.  No rebound, guarding or rigidity.  EXTREMITIES:  Without edema.  NEUROLOGICAL:  The patient is appropriate. Left arm almost back to normal size.    LABS:  Labs from   Lab Results   Component Value Date    WBC 8.86 02/03/2020    HGB 14.3 02/03/2020    HCT 44.2 02/03/2020    MCV 92 02/03/2020     02/03/2020     CMP  Sodium   Date Value Ref Range Status   02/03/2020 140 136 - 145 mmol/L Final     Potassium   Date Value Ref Range Status   02/03/2020 3.5 3.5 - 5.1 mmol/L Final     Chloride   Date Value Ref Range Status   02/03/2020 102 95 - 110 mmol/L Final     CO2   Date Value Ref Range Status   02/03/2020 29 22 - 31 mmol/L Final     Glucose   Date Value Ref Range Status   02/03/2020 150 (H) 70 - 110 mg/dL Final     Comment:     The ADA recommends the following guidelines for fasting glucose:  Normal:       less than 100 mg/dL  Prediabetes:  100 mg/dL to 125 mg/dL  Diabetes:     126 mg/dL or higher       BUN, Bld   Date Value Ref Range Status   02/03/2020 17 7 - 18 mg/dL Final     Creatinine   Date Value Ref Range Status   02/03/2020 0.71 0.50 - 1.40 mg/dL Final     Calcium   Date Value Ref Range Status   02/03/2020 10.0 8.4 - 10.2 mg/dL Final     Total Protein   Date Value Ref Range Status   02/03/2020 7.0 6.0 - 8.4 g/dL Final     Albumin   Date Value Ref Range Status   02/03/2020 4.2 3.5 - 5.2 g/dL Final     Total Bilirubin   Date Value Ref Range Status   02/03/2020 0.4 0.2 - 1.3 mg/dL Final     Alkaline Phosphatase   Date Value Ref Range Status   02/03/2020 62 38 - 145 U/L Final     AST ([place])   Date Value Ref Range Status   01/18/2016 22 14 - 36 U/L  Final     AST   Date Value Ref Range Status   02/03/2020 22 14 - 36 U/L Final     ALT   Date Value Ref Range Status   02/03/2020 22 0 - 35 U/L Final     Anion Gap   Date Value Ref Range Status   02/03/2020 9 8 - 16 mmol/L Final     eGFR if    Date Value Ref Range Status   02/03/2020 >60 >60 mL/min/1.73 m^2 Final     eGFR if non    Date Value Ref Range Status   02/03/2020 >60 >60 mL/min/1.73 m^2 Final     Comment:     Calculation used to obtain the estimated glomerular filtration  rate (eGFR) is the CKD-EPI equation.      PATHOLOGY:  As mentioned above.  Echo done in 6/2018  Other findings: None.      Impression  Venous ultrasound October 2018       Thrombus in the basilic, radial and ulnar veins.   PET 2/2020:  Impression       1. No PET-CT imaging findings to suggest significant residual or recurrent neoplastic disease.  2. Small lipid rich right adrenal adenoma.       [id number] 19.6  Left arm : recent usg no dvt  IMPRESSION:  T2 N1 MX breast cancer with DCIS on left side and invasive   component on the right side.  ER/SC positive, HER-2 negative. Completed AC then weekly T  on arimidex daily.    Decided not to go through XRT  Port removed  Completing reconstruction with Dr. Alarcon.  rtc 6 months with CBC CMP CA 2729 PET scan   Rpt usg was neg for DVT on left arm so  eliquis was discontd   ok for prolia   cont calcium  Advance Care Planning     Living Will  Past documentation done

## 2020-03-05 NOTE — PLAN OF CARE
Discharge instructions given Verbally acknowledged understanding  Ambulated to private vehicle without difficulty  NAD

## 2020-08-05 PROBLEM — E66.811 CLASS 1 OBESITY DUE TO EXCESS CALORIES WITH SERIOUS COMORBIDITY AND BODY MASS INDEX (BMI) OF 34.0 TO 34.9 IN ADULT: Status: ACTIVE | Noted: 2020-08-05

## 2020-08-05 PROBLEM — E66.09 CLASS 1 OBESITY DUE TO EXCESS CALORIES WITH SERIOUS COMORBIDITY AND BODY MASS INDEX (BMI) OF 34.0 TO 34.9 IN ADULT: Status: ACTIVE | Noted: 2020-08-05

## 2020-08-27 ENCOUNTER — PATIENT MESSAGE (OUTPATIENT)
Dept: HEMATOLOGY/ONCOLOGY | Facility: CLINIC | Age: 65
End: 2020-08-27

## 2020-09-01 ENCOUNTER — HOSPITAL ENCOUNTER (OUTPATIENT)
Dept: RADIOLOGY | Facility: HOSPITAL | Age: 65
Discharge: HOME | End: 2020-09-01
Attending: INTERNAL MEDICINE
Payer: COMMERCIAL

## 2020-09-01 ENCOUNTER — OFFICE VISIT (OUTPATIENT)
Dept: HEMATOLOGY/ONCOLOGY | Facility: CLINIC | Age: 65
End: 2020-09-01
Payer: COMMERCIAL

## 2020-09-01 VITALS
TEMPERATURE: 98 F | WEIGHT: 196.44 LBS | OXYGEN SATURATION: 98 % | DIASTOLIC BLOOD PRESSURE: 71 MMHG | HEART RATE: 101 BPM | SYSTOLIC BLOOD PRESSURE: 121 MMHG | HEIGHT: 64 IN | RESPIRATION RATE: 20 BRPM | BODY MASS INDEX: 33.54 KG/M2

## 2020-09-01 DIAGNOSIS — M81.0 AGE-RELATED OSTEOPOROSIS WITHOUT CURRENT PATHOLOGICAL FRACTURE: ICD-10-CM

## 2020-09-01 DIAGNOSIS — D70.1 CHEMOTHERAPY INDUCED NEUTROPENIA: ICD-10-CM

## 2020-09-01 DIAGNOSIS — Z98.890 S/P BREAST RECONSTRUCTION: ICD-10-CM

## 2020-09-01 DIAGNOSIS — C50.811 MALIGNANT NEOPLASM OF OVERLAPPING SITES OF BOTH BREASTS IN FEMALE, ESTROGEN RECEPTOR POSITIVE: Primary | ICD-10-CM

## 2020-09-01 DIAGNOSIS — C50.812 MALIGNANT NEOPLASM OF OVERLAPPING SITES OF BOTH BREASTS IN FEMALE, ESTROGEN RECEPTOR POSITIVE: Primary | ICD-10-CM

## 2020-09-01 DIAGNOSIS — I10 ESSENTIAL HYPERTENSION: ICD-10-CM

## 2020-09-01 DIAGNOSIS — T45.1X5A CHEMOTHERAPY INDUCED NEUTROPENIA: ICD-10-CM

## 2020-09-01 DIAGNOSIS — Z17.0 MALIGNANT NEOPLASM OF OVERLAPPING SITES OF BOTH BREASTS IN FEMALE, ESTROGEN RECEPTOR POSITIVE: ICD-10-CM

## 2020-09-01 DIAGNOSIS — C50.812 MALIGNANT NEOPLASM OF OVERLAPPING SITES OF BOTH BREASTS IN FEMALE, ESTROGEN RECEPTOR POSITIVE: ICD-10-CM

## 2020-09-01 DIAGNOSIS — C50.811 MALIGNANT NEOPLASM OF OVERLAPPING SITES OF BOTH BREASTS IN FEMALE, ESTROGEN RECEPTOR POSITIVE: ICD-10-CM

## 2020-09-01 DIAGNOSIS — Z17.0 MALIGNANT NEOPLASM OF OVERLAPPING SITES OF BOTH BREASTS IN FEMALE, ESTROGEN RECEPTOR POSITIVE: Primary | ICD-10-CM

## 2020-09-01 PROCEDURE — 78815 NM PET CT ROUTINE: ICD-10-PCS | Mod: 26,PS,, | Performed by: RADIOLOGY

## 2020-09-01 PROCEDURE — 78815 PET IMAGE W/CT SKULL-THIGH: CPT | Mod: 26,PS,, | Performed by: RADIOLOGY

## 2020-09-01 PROCEDURE — 99999 PR PBB SHADOW E&M-EST. PATIENT-LVL IV: CPT | Mod: PBBFAC,,, | Performed by: INTERNAL MEDICINE

## 2020-09-01 PROCEDURE — 99999 PR PBB SHADOW E&M-EST. PATIENT-LVL IV: ICD-10-PCS | Mod: PBBFAC,,, | Performed by: INTERNAL MEDICINE

## 2020-09-01 PROCEDURE — 99214 PR OFFICE/OUTPT VISIT, EST, LEVL IV, 30-39 MIN: ICD-10-PCS | Mod: S$GLB,,, | Performed by: INTERNAL MEDICINE

## 2020-09-01 PROCEDURE — 78815 PET IMAGE W/CT SKULL-THIGH: CPT | Mod: TC,PO

## 2020-09-01 PROCEDURE — 99214 OFFICE O/P EST MOD 30 MIN: CPT | Mod: S$GLB,,, | Performed by: INTERNAL MEDICINE

## 2020-09-01 PROCEDURE — A9552 F18 FDG: HCPCS | Mod: PO

## 2020-09-01 NOTE — PROGRESS NOTES
Ga Cross is 65 years of age  woman  for  breast cancer.s/p AC followed by T chemotherapy.      She has undergone bilateral mastectomies with Dr. Cardenas.  Pathology reveals three sentinel lymph nodes on the right side, one   of which showed micrometastases size of 0.8 mm without extranodal extension.    The patient shows invasive ductal carcinoma 1.4 cm with low-grade ductal   carcinoma in situ on the right side.  On the left breast, the patient shows no   evidence of lymph node metastases on sentinel lymph node evaluation, focal   low-grade LCIS and DCIS on the left side, but no invasive component.  The   patient's invasive ductal carcinoma total size is about 2.3 cm, status post   mastectomy.  She stages as a T2 N1 MX.  The patient has the invasive ductal   carcinoma showing ER positivity, [place] positivity, and HER-2 negativity by FISH.    Has seen xrt in consult, DR. Graham decided against XRT Completed AC  X 4 then 12 weeks of taxol, ( 5/24/2018)     MEDICAL HISTORY:  Significant for dyslipidemia, hypertension, mitral valve   prolapse, seasonal allergies. She has completed reconstruction with Dr. Alarcon however had left arm swelling with IV , diagnosed with superficial thrombosis of her left arm started was on Eliquis.  Now off.    MEDICATIONS:  Include Norvasc, cyclobenzaprine, HydroDIURIL, ibuprofen.zofran, mouthwash, compazine prn    ALLERGIES:  She is allergic to hydromorphone and penicillin.    PHYSICAL EXAMINATION:   VITAL SIGNS:  Reveals the patient with 185 pound weight.  BSA 1.94, height of 5   feet 4 inches, no pain.  Wt Readings from Last 3 Encounters:   08/05/20 90.6 kg (199 lb 11.2 oz)   06/09/20 84.8 kg (187 lb)   06/03/20 90.3 kg (199 lb)     Temp Readings from Last 3 Encounters:   08/05/20 98.4 °F (36.9 °C) (Oral)   06/03/20 99.6 °F (37.6 °C) (Oral)   03/05/20 98.2 °F (36.8 °C) (Oral)     BP Readings from Last 3 Encounters:   08/05/20 136/82   06/03/20 (!) 140/80   03/05/20  122/75     Pulse Readings from Last 3 Encounters:   08/05/20 90   06/03/20 106   03/05/20 90   GENERAL:  Awake, alert, oriented.  BREASTS:  Double mastectomy is healing well.  LUNGS:  Clear to auscultation.  No JVD.  Trachea is central.  CVS:  S1, S2 normally heard.  No murmurs or gallops.  ABDOMEN:  Soft.  No rebound, guarding or rigidity.  EXTREMITIES:  Without edema.  NEUROLOGICAL:  The patient is appropriate. Left arm almost back to normal size.    LABS:  Labs from   Lab Results   Component Value Date    WBC 8.03 08/31/2020    HGB 15.0 08/31/2020    HCT 45.0 08/31/2020    MCV 92 08/31/2020     08/31/2020     CMP  Sodium   Date Value Ref Range Status   08/31/2020 138 136 - 145 mmol/L Final     Potassium   Date Value Ref Range Status   08/31/2020 3.6 3.5 - 5.1 mmol/L Final     Chloride   Date Value Ref Range Status   08/31/2020 100 95 - 110 mmol/L Final     CO2   Date Value Ref Range Status   08/31/2020 30 22 - 31 mmol/L Final     Glucose   Date Value Ref Range Status   08/31/2020 162 (H) 70 - 110 mg/dL Final     Comment:     The ADA recommends the following guidelines for fasting glucose:  Normal:       less than 100 mg/dL  Prediabetes:  100 mg/dL to 125 mg/dL  Diabetes:     126 mg/dL or higher       BUN, Bld   Date Value Ref Range Status   08/31/2020 13 7 - 18 mg/dL Final     Creatinine   Date Value Ref Range Status   08/31/2020 0.63 0.50 - 1.40 mg/dL Final     Calcium   Date Value Ref Range Status   08/31/2020 9.8 8.4 - 10.2 mg/dL Final     Total Protein   Date Value Ref Range Status   08/31/2020 7.2 6.0 - 8.4 g/dL Final     Albumin   Date Value Ref Range Status   08/31/2020 4.5 3.5 - 5.2 g/dL Final     Total Bilirubin   Date Value Ref Range Status   08/31/2020 0.5 0.2 - 1.3 mg/dL Final     Alkaline Phosphatase   Date Value Ref Range Status   08/31/2020 62 38 - 145 U/L Final     AST ([place])   Date Value Ref Range Status   01/18/2016 22 14 - 36 U/L Final     AST   Date Value Ref Range Status    08/31/2020 26 14 - 36 U/L Final     ALT   Date Value Ref Range Status   08/31/2020 23 0 - 35 U/L Final     Anion Gap   Date Value Ref Range Status   08/31/2020 8 8 - 16 mmol/L Final     eGFR if    Date Value Ref Range Status   08/31/2020 >60 >60 mL/min/1.73 m^2 Final     eGFR if non    Date Value Ref Range Status   08/31/2020 >60 >60 mL/min/1.73 m^2 Final     Comment:     Calculation used to obtain the estimated glomerular filtration  rate (eGFR) is the CKD-EPI equation.      PATHOLOGY:  As mentioned above.  Echo done in 6/2018  Other findings: None.      Impression  Venous ultrasound October 2018       Thrombus in the basilic, radial and ulnar veins.   PET 2/2020:  Impression PET August 2020       1. No PET-CT imaging findings to suggest significant residual or recurrent neoplastic disease.  2. Small lipid rich right adrenal adenoma.       [id number] 19.6    IMPRESSION:  T2 N1 MX breast cancer with DCIS on left side and invasive   component on the right side.  ER/[place] positive, HER-2 negative. Completed AC then weekly T  on arimidex daily.    Decided not to go through XRT  Port removed  Completing reconstruction with Dr. Alarcon.  rtc 6 months with CBC CMP CA 2729 PET scan advised patient q.3 months tumor marker evaluation but she seems to like to delay this and will call if she has concerns  Rpt usg was neg for DVT on left arm so  eliquis was discontd   ok for prolia   cont calcium  Advance Care planning/ directives /living will/patient's wishes discussed with patient.  Patient has been given guidelines and instructions on completing these directives  COVID social distancing, face mask use, hand washing techniques and personal hygiene routine discussed with patient  Good exercise, nutrition and weight management discussed with patient  Health maintenance activities and follow-up with PCPs recommendations discussed with patient

## 2020-09-03 ENCOUNTER — INFUSION (OUTPATIENT)
Dept: INFUSION THERAPY | Facility: HOSPITAL | Age: 65
End: 2020-09-03
Attending: INTERNAL MEDICINE
Payer: COMMERCIAL

## 2020-09-03 VITALS
HEART RATE: 108 BPM | DIASTOLIC BLOOD PRESSURE: 76 MMHG | SYSTOLIC BLOOD PRESSURE: 129 MMHG | TEMPERATURE: 98 F | RESPIRATION RATE: 20 BRPM

## 2020-09-03 DIAGNOSIS — Z95.828 PORT-A-CATH IN PLACE: ICD-10-CM

## 2020-09-03 DIAGNOSIS — M81.8 OTHER OSTEOPOROSIS WITHOUT CURRENT PATHOLOGICAL FRACTURE: Primary | ICD-10-CM

## 2020-09-03 PROCEDURE — 96372 THER/PROPH/DIAG INJ SC/IM: CPT | Mod: PN

## 2020-09-03 PROCEDURE — 63600175 PHARM REV CODE 636 W HCPCS: Mod: JG,PN | Performed by: INTERNAL MEDICINE

## 2020-09-03 RX ADMIN — DENOSUMAB 60 MG: 60 INJECTION SUBCUTANEOUS at 01:09

## 2021-02-17 ENCOUNTER — PATIENT MESSAGE (OUTPATIENT)
Dept: HEMATOLOGY/ONCOLOGY | Facility: CLINIC | Age: 66
End: 2021-02-17

## 2021-02-18 ENCOUNTER — DOCUMENTATION ONLY (OUTPATIENT)
Dept: INFUSION THERAPY | Facility: HOSPITAL | Age: 66
End: 2021-02-18

## 2021-02-24 DIAGNOSIS — Z17.0 MALIGNANT NEOPLASM OF OVERLAPPING SITES OF BOTH BREASTS IN FEMALE, ESTROGEN RECEPTOR POSITIVE: ICD-10-CM

## 2021-02-24 DIAGNOSIS — C50.811 MALIGNANT NEOPLASM OF OVERLAPPING SITES OF BOTH BREASTS IN FEMALE, ESTROGEN RECEPTOR POSITIVE: ICD-10-CM

## 2021-02-24 DIAGNOSIS — C50.812 MALIGNANT NEOPLASM OF OVERLAPPING SITES OF BOTH BREASTS IN FEMALE, ESTROGEN RECEPTOR POSITIVE: ICD-10-CM

## 2021-02-24 RX ORDER — ANASTROZOLE 1 MG/1
1 TABLET ORAL DAILY
Qty: 30 TABLET | Refills: 6
Start: 2021-02-24 | End: 2021-08-06 | Stop reason: SDUPTHER

## 2021-03-09 ENCOUNTER — OFFICE VISIT (OUTPATIENT)
Dept: HEMATOLOGY/ONCOLOGY | Facility: CLINIC | Age: 66
End: 2021-03-09
Payer: COMMERCIAL

## 2021-03-09 ENCOUNTER — INFUSION (OUTPATIENT)
Dept: INFUSION THERAPY | Facility: HOSPITAL | Age: 66
End: 2021-03-09
Attending: INTERNAL MEDICINE
Payer: COMMERCIAL

## 2021-03-09 ENCOUNTER — HOSPITAL ENCOUNTER (OUTPATIENT)
Dept: RADIOLOGY | Facility: HOSPITAL | Age: 66
Discharge: HOME | End: 2021-03-09
Attending: INTERNAL MEDICINE
Payer: COMMERCIAL

## 2021-03-09 VITALS
RESPIRATION RATE: 20 BRPM | TEMPERATURE: 98 F | SYSTOLIC BLOOD PRESSURE: 120 MMHG | DIASTOLIC BLOOD PRESSURE: 78 MMHG | HEART RATE: 83 BPM

## 2021-03-09 VITALS
SYSTOLIC BLOOD PRESSURE: 120 MMHG | TEMPERATURE: 98 F | OXYGEN SATURATION: 98 % | WEIGHT: 196.31 LBS | DIASTOLIC BLOOD PRESSURE: 78 MMHG | HEART RATE: 83 BPM | BODY MASS INDEX: 33.7 KG/M2

## 2021-03-09 DIAGNOSIS — C50.812 MALIGNANT NEOPLASM OF OVERLAPPING SITES OF BOTH BREASTS IN FEMALE, ESTROGEN RECEPTOR POSITIVE: ICD-10-CM

## 2021-03-09 DIAGNOSIS — Z17.0 MALIGNANT NEOPLASM OF OVERLAPPING SITES OF BOTH BREASTS IN FEMALE, ESTROGEN RECEPTOR POSITIVE: ICD-10-CM

## 2021-03-09 DIAGNOSIS — T45.1X5A CHEMOTHERAPY INDUCED NEUTROPENIA: ICD-10-CM

## 2021-03-09 DIAGNOSIS — M81.8 OTHER OSTEOPOROSIS WITHOUT CURRENT PATHOLOGICAL FRACTURE: Primary | ICD-10-CM

## 2021-03-09 DIAGNOSIS — C50.812 MALIGNANT NEOPLASM OF OVERLAPPING SITES OF BOTH BREASTS IN FEMALE, ESTROGEN RECEPTOR POSITIVE: Primary | ICD-10-CM

## 2021-03-09 DIAGNOSIS — C50.811 MALIGNANT NEOPLASM OF OVERLAPPING SITES OF BOTH BREASTS IN FEMALE, ESTROGEN RECEPTOR POSITIVE: ICD-10-CM

## 2021-03-09 DIAGNOSIS — Z95.828 PORT-A-CATH IN PLACE: ICD-10-CM

## 2021-03-09 DIAGNOSIS — C50.811 MALIGNANT NEOPLASM OF OVERLAPPING SITES OF BOTH BREASTS IN FEMALE, ESTROGEN RECEPTOR POSITIVE: Primary | ICD-10-CM

## 2021-03-09 DIAGNOSIS — Z17.0 MALIGNANT NEOPLASM OF OVERLAPPING SITES OF BOTH BREASTS IN FEMALE, ESTROGEN RECEPTOR POSITIVE: Primary | ICD-10-CM

## 2021-03-09 DIAGNOSIS — D70.1 CHEMOTHERAPY INDUCED NEUTROPENIA: ICD-10-CM

## 2021-03-09 DIAGNOSIS — I10 ESSENTIAL HYPERTENSION: ICD-10-CM

## 2021-03-09 PROCEDURE — 99999 PR PBB SHADOW E&M-EST. PATIENT-LVL III: ICD-10-PCS | Mod: PBBFAC,,, | Performed by: INTERNAL MEDICINE

## 2021-03-09 PROCEDURE — 96372 THER/PROPH/DIAG INJ SC/IM: CPT | Mod: PN

## 2021-03-09 PROCEDURE — 78815 PET IMAGE W/CT SKULL-THIGH: CPT | Mod: 26,PS,, | Performed by: RADIOLOGY

## 2021-03-09 PROCEDURE — 63600175 PHARM REV CODE 636 W HCPCS: Mod: JG,PN | Performed by: INTERNAL MEDICINE

## 2021-03-09 PROCEDURE — 78815 NM PET CT ROUTINE: ICD-10-PCS | Mod: 26,PS,, | Performed by: RADIOLOGY

## 2021-03-09 PROCEDURE — 78815 PET IMAGE W/CT SKULL-THIGH: CPT | Mod: TC,PO

## 2021-03-09 PROCEDURE — 99214 OFFICE O/P EST MOD 30 MIN: CPT | Mod: S$GLB,,, | Performed by: INTERNAL MEDICINE

## 2021-03-09 PROCEDURE — 99999 PR PBB SHADOW E&M-EST. PATIENT-LVL III: CPT | Mod: PBBFAC,,, | Performed by: INTERNAL MEDICINE

## 2021-03-09 PROCEDURE — 99214 PR OFFICE/OUTPT VISIT, EST, LEVL IV, 30-39 MIN: ICD-10-PCS | Mod: S$GLB,,, | Performed by: INTERNAL MEDICINE

## 2021-03-09 PROCEDURE — A9552 F18 FDG: HCPCS | Mod: PO

## 2021-03-09 RX ADMIN — DENOSUMAB 60 MG: 60 INJECTION SUBCUTANEOUS at 02:03

## 2021-03-10 ENCOUNTER — PATIENT MESSAGE (OUTPATIENT)
Dept: HEMATOLOGY/ONCOLOGY | Facility: CLINIC | Age: 66
End: 2021-03-10

## 2021-08-30 ENCOUNTER — PATIENT MESSAGE (OUTPATIENT)
Dept: INFUSION THERAPY | Facility: HOSPITAL | Age: 66
End: 2021-08-30

## 2021-09-07 ENCOUNTER — HOSPITAL ENCOUNTER (OUTPATIENT)
Dept: RADIOLOGY | Facility: HOSPITAL | Age: 66
Discharge: HOME | End: 2021-09-07
Attending: INTERNAL MEDICINE
Payer: COMMERCIAL

## 2021-09-07 ENCOUNTER — OFFICE VISIT (OUTPATIENT)
Dept: HEMATOLOGY/ONCOLOGY | Facility: CLINIC | Age: 66
End: 2021-09-07
Payer: COMMERCIAL

## 2021-09-07 VITALS
RESPIRATION RATE: 18 BRPM | BODY MASS INDEX: 34.33 KG/M2 | HEART RATE: 97 BPM | DIASTOLIC BLOOD PRESSURE: 82 MMHG | SYSTOLIC BLOOD PRESSURE: 142 MMHG | OXYGEN SATURATION: 98 % | WEIGHT: 201.06 LBS | TEMPERATURE: 99 F | HEIGHT: 64 IN

## 2021-09-07 DIAGNOSIS — C50.812 MALIGNANT NEOPLASM OF OVERLAPPING SITES OF BOTH BREASTS IN FEMALE, ESTROGEN RECEPTOR POSITIVE: ICD-10-CM

## 2021-09-07 DIAGNOSIS — Z17.0 MALIGNANT NEOPLASM OF OVERLAPPING SITES OF BOTH BREASTS IN FEMALE, ESTROGEN RECEPTOR POSITIVE: ICD-10-CM

## 2021-09-07 DIAGNOSIS — I10 ESSENTIAL HYPERTENSION: ICD-10-CM

## 2021-09-07 DIAGNOSIS — C50.811 MALIGNANT NEOPLASM OF OVERLAPPING SITES OF BOTH BREASTS IN FEMALE, ESTROGEN RECEPTOR POSITIVE: ICD-10-CM

## 2021-09-07 DIAGNOSIS — E78.00 PURE HYPERCHOLESTEROLEMIA: Primary | ICD-10-CM

## 2021-09-07 LAB — GLUCOSE SERPL-MCNC: 96 MG/DL (ref 70–110)

## 2021-09-07 PROCEDURE — 78815 NM PET CT ROUTINE: ICD-10-PCS | Mod: 26,PS,, | Performed by: RADIOLOGY

## 2021-09-07 PROCEDURE — 78815 PET IMAGE W/CT SKULL-THIGH: CPT | Mod: 26,PS,, | Performed by: RADIOLOGY

## 2021-09-07 PROCEDURE — 99214 OFFICE O/P EST MOD 30 MIN: CPT | Mod: S$GLB,,, | Performed by: INTERNAL MEDICINE

## 2021-09-07 PROCEDURE — 99999 PR PBB SHADOW E&M-EST. PATIENT-LVL III: CPT | Mod: PBBFAC,,, | Performed by: INTERNAL MEDICINE

## 2021-09-07 PROCEDURE — 99999 PR PBB SHADOW E&M-EST. PATIENT-LVL III: ICD-10-PCS | Mod: PBBFAC,,, | Performed by: INTERNAL MEDICINE

## 2021-09-07 PROCEDURE — 99214 PR OFFICE/OUTPT VISIT, EST, LEVL IV, 30-39 MIN: ICD-10-PCS | Mod: S$GLB,,, | Performed by: INTERNAL MEDICINE

## 2021-09-07 PROCEDURE — 78815 PET IMAGE W/CT SKULL-THIGH: CPT | Mod: TC,PN

## 2021-09-07 PROCEDURE — A9552 F18 FDG: HCPCS | Mod: PN

## 2021-09-08 ENCOUNTER — PATIENT MESSAGE (OUTPATIENT)
Dept: HEMATOLOGY/ONCOLOGY | Facility: CLINIC | Age: 66
End: 2021-09-08

## 2021-09-09 ENCOUNTER — INFUSION (OUTPATIENT)
Dept: INFUSION THERAPY | Facility: HOSPITAL | Age: 66
End: 2021-09-09
Attending: INTERNAL MEDICINE
Payer: COMMERCIAL

## 2021-09-09 VITALS
BODY MASS INDEX: 34.33 KG/M2 | WEIGHT: 201.06 LBS | DIASTOLIC BLOOD PRESSURE: 78 MMHG | HEIGHT: 64 IN | HEART RATE: 87 BPM | RESPIRATION RATE: 20 BRPM | SYSTOLIC BLOOD PRESSURE: 133 MMHG | TEMPERATURE: 98 F | OXYGEN SATURATION: 98 %

## 2021-09-09 DIAGNOSIS — M81.8 OTHER OSTEOPOROSIS WITHOUT CURRENT PATHOLOGICAL FRACTURE: Primary | ICD-10-CM

## 2021-09-09 DIAGNOSIS — Z95.828 PORT-A-CATH IN PLACE: ICD-10-CM

## 2021-09-09 PROCEDURE — 96372 THER/PROPH/DIAG INJ SC/IM: CPT | Mod: PN

## 2021-09-09 PROCEDURE — 63600175 PHARM REV CODE 636 W HCPCS: Mod: JG,PN | Performed by: INTERNAL MEDICINE

## 2021-09-09 RX ADMIN — DENOSUMAB 60 MG: 60 INJECTION SUBCUTANEOUS at 01:09

## 2021-12-22 ENCOUNTER — TELEPHONE (OUTPATIENT)
Dept: INFUSION THERAPY | Facility: HOSPITAL | Age: 66
End: 2021-12-22
Payer: COMMERCIAL

## 2022-01-04 ENCOUNTER — TELEPHONE (OUTPATIENT)
Dept: HEMATOLOGY/ONCOLOGY | Facility: CLINIC | Age: 67
End: 2022-01-04
Payer: COMMERCIAL

## 2022-01-04 NOTE — TELEPHONE ENCOUNTER
Contacted pt in regards to her Dr morgan appointment on 03/08/22. Lm on vm to call and rescheduled. Call back number provided

## 2022-02-08 PROBLEM — M85.89 OSTEOPENIA OF MULTIPLE SITES: Status: ACTIVE | Noted: 2022-02-08

## 2022-03-08 ENCOUNTER — PATIENT MESSAGE (OUTPATIENT)
Dept: HEMATOLOGY/ONCOLOGY | Facility: CLINIC | Age: 67
End: 2022-03-08

## 2022-03-08 ENCOUNTER — HOSPITAL ENCOUNTER (OUTPATIENT)
Dept: RADIOLOGY | Facility: HOSPITAL | Age: 67
Discharge: HOME | End: 2022-03-08
Attending: INTERNAL MEDICINE
Payer: COMMERCIAL

## 2022-03-08 ENCOUNTER — OFFICE VISIT (OUTPATIENT)
Dept: HEMATOLOGY/ONCOLOGY | Facility: CLINIC | Age: 67
End: 2022-03-08
Payer: COMMERCIAL

## 2022-03-08 VITALS
DIASTOLIC BLOOD PRESSURE: 90 MMHG | TEMPERATURE: 99 F | OXYGEN SATURATION: 96 % | WEIGHT: 201.5 LBS | RESPIRATION RATE: 18 BRPM | BODY MASS INDEX: 34.4 KG/M2 | HEART RATE: 92 BPM | SYSTOLIC BLOOD PRESSURE: 144 MMHG | HEIGHT: 64 IN

## 2022-03-08 DIAGNOSIS — C50.812 MALIGNANT NEOPLASM OF OVERLAPPING SITES OF BOTH BREASTS IN FEMALE, ESTROGEN RECEPTOR POSITIVE: ICD-10-CM

## 2022-03-08 DIAGNOSIS — I82.612 SUPERFICIAL VENOUS THROMBOSIS OF LEFT ARM: ICD-10-CM

## 2022-03-08 DIAGNOSIS — M81.0 AGE-RELATED OSTEOPOROSIS WITHOUT CURRENT PATHOLOGICAL FRACTURE: ICD-10-CM

## 2022-03-08 DIAGNOSIS — Z17.0 MALIGNANT NEOPLASM OF OVERLAPPING SITES OF BOTH BREASTS IN FEMALE, ESTROGEN RECEPTOR POSITIVE: Primary | ICD-10-CM

## 2022-03-08 DIAGNOSIS — Z79.811 AROMATASE INHIBITOR USE: ICD-10-CM

## 2022-03-08 DIAGNOSIS — C50.812 MALIGNANT NEOPLASM OF OVERLAPPING SITES OF BOTH BREASTS IN FEMALE, ESTROGEN RECEPTOR POSITIVE: Primary | ICD-10-CM

## 2022-03-08 DIAGNOSIS — C50.811 MALIGNANT NEOPLASM OF OVERLAPPING SITES OF BOTH BREASTS IN FEMALE, ESTROGEN RECEPTOR POSITIVE: ICD-10-CM

## 2022-03-08 DIAGNOSIS — Z17.0 MALIGNANT NEOPLASM OF OVERLAPPING SITES OF BOTH BREASTS IN FEMALE, ESTROGEN RECEPTOR POSITIVE: ICD-10-CM

## 2022-03-08 DIAGNOSIS — C50.811 MALIGNANT NEOPLASM OF OVERLAPPING SITES OF BOTH BREASTS IN FEMALE, ESTROGEN RECEPTOR POSITIVE: Primary | ICD-10-CM

## 2022-03-08 LAB — GLUCOSE SERPL-MCNC: 88 MG/DL (ref 70–110)

## 2022-03-08 PROCEDURE — 3008F PR BODY MASS INDEX (BMI) DOCUMENTED: ICD-10-PCS | Mod: CPTII,S$GLB,, | Performed by: STUDENT IN AN ORGANIZED HEALTH CARE EDUCATION/TRAINING PROGRAM

## 2022-03-08 PROCEDURE — 3008F BODY MASS INDEX DOCD: CPT | Mod: CPTII,S$GLB,, | Performed by: STUDENT IN AN ORGANIZED HEALTH CARE EDUCATION/TRAINING PROGRAM

## 2022-03-08 PROCEDURE — 99214 PR OFFICE/OUTPT VISIT, EST, LEVL IV, 30-39 MIN: ICD-10-PCS | Mod: S$GLB,,, | Performed by: STUDENT IN AN ORGANIZED HEALTH CARE EDUCATION/TRAINING PROGRAM

## 2022-03-08 PROCEDURE — 3077F PR MOST RECENT SYSTOLIC BLOOD PRESSURE >= 140 MM HG: ICD-10-PCS | Mod: CPTII,S$GLB,, | Performed by: STUDENT IN AN ORGANIZED HEALTH CARE EDUCATION/TRAINING PROGRAM

## 2022-03-08 PROCEDURE — 3077F SYST BP >= 140 MM HG: CPT | Mod: CPTII,S$GLB,, | Performed by: STUDENT IN AN ORGANIZED HEALTH CARE EDUCATION/TRAINING PROGRAM

## 2022-03-08 PROCEDURE — 3080F DIAST BP >= 90 MM HG: CPT | Mod: CPTII,S$GLB,, | Performed by: STUDENT IN AN ORGANIZED HEALTH CARE EDUCATION/TRAINING PROGRAM

## 2022-03-08 PROCEDURE — 3080F PR MOST RECENT DIASTOLIC BLOOD PRESSURE >= 90 MM HG: ICD-10-PCS | Mod: CPTII,S$GLB,, | Performed by: STUDENT IN AN ORGANIZED HEALTH CARE EDUCATION/TRAINING PROGRAM

## 2022-03-08 PROCEDURE — A9552 F18 FDG: HCPCS | Mod: PN

## 2022-03-08 PROCEDURE — 99999 PR PBB SHADOW E&M-EST. PATIENT-LVL III: CPT | Mod: PBBFAC,,, | Performed by: STUDENT IN AN ORGANIZED HEALTH CARE EDUCATION/TRAINING PROGRAM

## 2022-03-08 PROCEDURE — 1160F RVW MEDS BY RX/DR IN RCRD: CPT | Mod: CPTII,S$GLB,, | Performed by: STUDENT IN AN ORGANIZED HEALTH CARE EDUCATION/TRAINING PROGRAM

## 2022-03-08 PROCEDURE — 99999 PR PBB SHADOW E&M-EST. PATIENT-LVL III: ICD-10-PCS | Mod: PBBFAC,,, | Performed by: STUDENT IN AN ORGANIZED HEALTH CARE EDUCATION/TRAINING PROGRAM

## 2022-03-08 PROCEDURE — 78815 PET IMAGE W/CT SKULL-THIGH: CPT | Mod: 26,PS,, | Performed by: RADIOLOGY

## 2022-03-08 PROCEDURE — 1160F PR REVIEW ALL MEDS BY PRESCRIBER/CLIN PHARMACIST DOCUMENTED: ICD-10-PCS | Mod: CPTII,S$GLB,, | Performed by: STUDENT IN AN ORGANIZED HEALTH CARE EDUCATION/TRAINING PROGRAM

## 2022-03-08 PROCEDURE — 1159F PR MEDICATION LIST DOCUMENTED IN MEDICAL RECORD: ICD-10-PCS | Mod: CPTII,S$GLB,, | Performed by: STUDENT IN AN ORGANIZED HEALTH CARE EDUCATION/TRAINING PROGRAM

## 2022-03-08 PROCEDURE — 99214 OFFICE O/P EST MOD 30 MIN: CPT | Mod: S$GLB,,, | Performed by: STUDENT IN AN ORGANIZED HEALTH CARE EDUCATION/TRAINING PROGRAM

## 2022-03-08 PROCEDURE — 1159F MED LIST DOCD IN RCRD: CPT | Mod: CPTII,S$GLB,, | Performed by: STUDENT IN AN ORGANIZED HEALTH CARE EDUCATION/TRAINING PROGRAM

## 2022-03-08 PROCEDURE — 78815 NM PET CT ROUTINE: ICD-10-PCS | Mod: 26,PS,, | Performed by: RADIOLOGY

## 2022-03-08 NOTE — PROGRESS NOTES
Subjective:                                                                                    Consult note   Patient ID:    Name: Vilma Cross  : 1955  MRN: [id number]    HPI:   Vilma Cross is a 66 y.o. female presents for evaluation of Malignant neoplasm of overlapping sites of both breast in f    Patient previously was seen Dr Mcintosh, last visit in 2021; diagnosed with stage II IDC in 2017 s/p B/L mastectomies by Dr Mcneil, pT2N1(mi)(sn) 1 of 3 LN positive on the right side (ER positivity, [place] positivity, and HER-2 negativity by FISH). negative left senile Lymph node with left breast consistent focal low-grade LCIS and DCIS on the left side, but no invasive component. Completed ACx2/ T 12 weeks in 2108 and defer XRT. Since then patient have been on anastrazole daily     Since her last clinic visit; had PET/CT with [facility] and blood work with no evidence of active disease. Patient is experience myalgia/fatigued/weight gain with anastrazole and still have neuropathy from the taxol     Oncology History   Malignant neoplasm of overlapping sites of both breasts in female, estrogen receptor positive   2017 Initial Diagnosis    Malignant neoplasm of overlapping sites of both breasts in female, estrogen receptor positive     2017 Cancer Staged    Staging form: Onc Breast AJCC V7  - Pathologic stage from 2017: Stage IIB (T2, N1, cM0)            Past Medical History:   Diagnosis Date    Breast cancer 2017    bilateral    Hyperlipidemia     no meds    Hypertension     Neuropathy of both feet     Neuropathy of finger     fingers and toes    Osteoporosis     Seasonal allergies        Past Surgical History:   Procedure Laterality Date    AUGMENTATION OF BREAST      BREAST BIOPSY Bilateral 2017    BREAST BIOPSY Right     BREAST CYST ASPIRATION Left     BREAST RECONSTRUCTION      BREAST SURGERY Bilateral 2017    reconstruction  - tissue expanders      BREAST SURGERY Left 07/13/2018    tissue expander replaced    CHOLECYSTECTOMY      INSERTION OF BREAST IMPLANT Bilateral 10/18/2018    Procedure: INSERTION, BREAST IMPLANT;  Surgeon: Nelly Alarcon MD;  Location: [facility];  Service: Plastics;  Laterality: Bilateral;    LIPOSUCTION Bilateral 10/18/2018    Procedure: LIPOSUCTION;  Surgeon: Nelly Alarcon MD;  Location: [facility];  Service: Plastics;  Laterality: Bilateral;    MASTECTOMY Bilateral 12/05/2017    with lynph node removed on right    portacath removal  08/2018    TISSUE EXPANDER PLACEMENT Left 7/13/2018    Procedure: INSERTION, TISSUE EXPANDER;  Surgeon: Nelly Alarcon MD;  Location: [facility];  Service: Plastics;  Laterality: Left;    TISSUE EXPANDER REMOVAL Left 7/13/2018    Procedure: REMOVAL, TISSUE EXPANDER;  Surgeon: Nelly Alarcon MD;  Location: [facility];  Service: Plastics;  Laterality: Left;    TISSUE EXPANDER REMOVAL Bilateral 10/18/2018    Procedure: REMOVAL, TISSUE EXPANDER;  Surgeon: Nelly Alarcon MD;  Location: [facility];  Service: Plastics;  Laterality: Bilateral;    TONSILLECTOMY      9 yo       Family History   Problem Relation Age of Onset    Heart disease Mother     Hyperlipidemia Mother     Heart disease Father     Hypertension Father     Hyperlipidemia Father     Stroke Father     Gout Father     Asthma Son        Social History     Socioeconomic History    Marital status:    Tobacco Use    Smoking status: Never Smoker    Smokeless tobacco: Never Used   Substance and Sexual Activity    Alcohol use: Yes     Alcohol/week: 0.0 standard drinks     Comment: Occasionaly: Daiquairi once per year    Drug use: No    Sexual activity: Yes     Partners: Male       Review of patient's allergies indicates:   Allergen Reactions    Hydromorphone Other (See Comments)     Numbness from the neck down, followed by severe nausea and vomiting    Penicillins Other (See Comments)     Sores in mouth, throat, and face  "      Review of Systems   Constitutional: Positive for weight gain. Negative for decreased appetite, fever and night sweats.   Eyes: Negative for blurred vision, double vision, pain and visual disturbance.   Cardiovascular: Negative for chest pain.   Respiratory: Negative for cough and shortness of breath.    Hematologic/Lymphatic: Negative for adenopathy.   Skin: Negative for rash.   Musculoskeletal: Positive for muscle cramps and myalgias. Negative for back pain.   Gastrointestinal: Negative for abdominal pain and diarrhea.   Genitourinary: Negative for frequency.   Neurological: Negative for headaches.   Psychiatric/Behavioral: The patient is not nervous/anxious.             Objective:     Vitals:    03/08/22 1424   BP: (!) 144/90   BP Location: Right arm   Patient Position: Sitting   BP Method: Medium (Manual)   Pulse: 92   Resp: 18   Temp: 98.6 °F (37 °C)   TempSrc: Oral   SpO2: 96%   Weight: 91.4 kg (201 lb 8 oz)   Height: 5' 4" (1.626 m)       Physical Exam  Constitutional:       General: She is not in acute distress.     Appearance: Normal appearance. She is normal weight.   HENT:      Head: Normocephalic and atraumatic.   Cardiovascular:      Rate and Rhythm: Normal rate and regular rhythm.      Heart sounds: Normal heart sounds.   Pulmonary:      Effort: Pulmonary effort is normal.      Breath sounds: Normal breath sounds. No wheezing.   Chest:      Chest wall: No tenderness.   Abdominal:      General: Abdomen is flat. Bowel sounds are normal. There is no distension.      Palpations: Abdomen is soft. There is no mass.   Musculoskeletal:      Right lower leg: No edema.   Lymphadenopathy:      Cervical: No cervical adenopathy.   Skin:     Coloration: Skin is not jaundiced or pale.   Psychiatric:         Mood and Affect: Mood normal.         Behavior: Behavior normal.         Current Outpatient Medications on File Prior to Visit   Medication Sig    amLODIPine (NORVASC) 10 MG tablet Take 1 tablet (10 mg " total) by mouth once daily.    anastrozole (ARIMIDEX) 1 mg Tab Take 1 tablet (1 mg total) by mouth once daily.    b complex vitamins tablet Take 1 tablet by mouth once daily.    calcium carbonate/vitamin D3 (CALCIUM 500 + D, D3, ORAL) Take 600 mg by mouth 2 (two) times a day.     denosumab (PROLIA) 60 mg/mL Syrg Inject 1 mL (60 mg total) into the skin every 6 (six) months.    hydroCHLOROthiazide (HYDRODIURIL) 25 MG tablet Take 1 tablet (25 mg total) by mouth once daily.     No current facility-administered medications on file prior to visit.       CBC:  Lab Results   Component Value Date    WBC 7.36 03/02/2022    HGB 15.2 03/02/2022    HCT 45.8 03/02/2022    MCV 90 03/02/2022     03/02/2022         CMP:  Sodium   Date Value Ref Range Status   03/02/2022 141 136 - 145 mmol/L Final     Potassium   Date Value Ref Range Status   03/02/2022 3.6 3.5 - 5.1 mmol/L Final     Chloride   Date Value Ref Range Status   03/02/2022 101 95 - 110 mmol/L Final     CO2   Date Value Ref Range Status   03/02/2022 33 (H) 22 - 31 mmol/L Final     Glucose   Date Value Ref Range Status   03/02/2022 114 (H) 70 - 110 mg/dL Final     Comment:     The ADA recommends the following guidelines for fasting glucose:    Normal:       less than 100 mg/dL    Prediabetes:  100 mg/dL to 125 mg/dL    Diabetes:     126 mg/dL or higher       BUN   Date Value Ref Range Status   03/02/2022 11 7 - 18 mg/dL Final     Creatinine   Date Value Ref Range Status   03/02/2022 0.70 0.50 - 1.40 mg/dL Final     Calcium   Date Value Ref Range Status   03/02/2022 9.3 8.4 - 10.2 mg/dL Final     Total Protein   Date Value Ref Range Status   03/02/2022 7.2 6.0 - 8.4 g/dL Final     Albumin   Date Value Ref Range Status   03/02/2022 4.3 3.5 - 5.2 g/dL Final     Total Bilirubin   Date Value Ref Range Status   03/02/2022 0.4 0.2 - 1.3 mg/dL Final     Alkaline Phosphatase   Date Value Ref Range Status   03/02/2022 64 38 - 145 U/L Final     AST ([facility])   Date  Value Ref Range Status   01/18/2016 22 14 - 36 U/L Final     AST   Date Value Ref Range Status   03/02/2022 26 14 - 36 U/L Final     ALT   Date Value Ref Range Status   03/02/2022 24 0 - 35 U/L Final     Anion Gap   Date Value Ref Range Status   03/02/2022 7 (L) 8 - 16 mmol/L Final     eGFR if    Date Value Ref Range Status   03/02/2022 >60 >60 mL/min/1.73 m^2 Final     eGFR if non    Date Value Ref Range Status   03/02/2022 >60 >60 mL/min/1.73 m^2 Final     Comment:     Calculation used to obtain the estimated glomerular filtration  rate (eGFR) is the CKD-EPI equation.          CT/PET SCAN ROUTINE  Narrative: EXAMINATION:  NM PET CT ROUTINE    CLINICAL HISTORY:  breast ca recurrence;  Malignant neoplasm of overlapping sites of right female breast    66-year-old female with a history of bilateral breast cancer.  The patient is status post bilateral mastectomy and chemotherapy.    TECHNIQUE:  Following IV injection of 11.15 mCi F-18 FDG, 3D PET imaging was performed from the skull base to the mid thighs.  A noncontrast non breath hold CT was obtained in conjunction with the PET scan for attenuation correction and anatomic correlation.  PET-CT fusion images were generated.    COMPARISON:  09/07/2021    FINDINGS:  Head/neck:    No significant abnormal hypermetabolic foci are identified in the head and neck region.  No lymphadenopathy is present.    Chest:    No significant abnormal hypermetabolic foci are identified within the chest.  No hypermetabolic pulmonary nodules, lymphadenopathy, pleural effusion, or pericardial effusion are present.  Intact bilateral breast implants are present.    Abdomen/pelvis:    No significant abnormal hypermetabolic foci are identified within the abdomen and pelvis.  No lymphadenopathy or ascites are present.  The adrenal glands are normal.    Skeleton:    No significant abnormal hypermetabolic foci are identified within the skeleton and there are no  findings to suggest osseous neoplastic disease.  Periarticular hypermetabolism is present in multiple joint regions consistent with degenerative arthrosis.  Focal hypermetabolism related to the left C3-4 and left T1-2 facet joints remains unchanged and is consistent with degenerative arthrosis.  Impression: No PET-CT imaging findings to suggest significant residual or recurrent neoplastic disease.  There has been no significant change.    Electronically signed by: Marquez Kasper MD  Date:    03/08/2022  Time:    13:02       ECOG SCORE            All pertinent labs and imaging reviewed.    Assessment:       1. Malignant neoplasm of overlapping sites of both breasts in female, estrogen receptor positive    2. Superficial venous thrombosis of left arm    3. Age-related osteoporosis without current pathological fracture    4. Aromatase inhibitor use      # Stage IIA IDC of the right breast s/p B/l Mastectomy   - ER/NM+, HER-2 negative, with N1 (micro) on the right side   - s/p adjuvant chemotherapy AC/T in 2018, started on anastrozole daily, plan fr 5 year  - discussed with patient the possibility of cont it for 7.5 -10 years given her stage II disease and the fact that she did not get XRT, will discus further   - Getting PET/CT; last one today with [facility], explain that Pet might not be the best imaging to monitor breast cancer and will hold off getting routine imaging and just get if needed from symptoms   - Blood work with [facility], cont annually     # Osteoporosis:   - age related worsening with AI, on prolia, cont   - last DEXA scan in 2021, will repeat another one in 2 years   - cont with Vit D and calcium     # Chronic use of AI:  - might consider IO consult, patient will think about it and let us know     Plan:     Malignant neoplasm of overlapping sites of both breasts in female, estrogen receptor positive    Superficial venous thrombosis of left arm    Age-related osteoporosis without current pathological  fracture    Aromatase inhibitor use      Patient queried and all questions were answered.    Plan was discussed with the patient at length, and she verbalized understanding.    Recommend  exercise, nutrition and weight management and health maintenance activities and follow-up with PCPs recommendations     F/u in 6 months     Signed:  Dang Benitez MD  Hematology and Oncology  [facility]

## 2022-03-08 NOTE — PROGRESS NOTES
PET Imaging Questionnaire    1. Are you a Diabetic? Recent Blood Sugar level? No    2. Are you anemic? Bone Marrow Stimulation Meds? No    3. Have you had a CT Scan, if so when & where was your last one? Yes -     4. Have you had a PET Scan, if so when & where was your last one? Yes -     5. Chemotherapy or currently on Chemotherapy? Yes    6. Radiation therapy? No    Surgical History:   Past Surgical History:   Procedure Laterality Date    AUGMENTATION OF BREAST      BREAST BIOPSY Bilateral 2017    BREAST BIOPSY Right 2020    BREAST CYST ASPIRATION Left 2018    BREAST RECONSTRUCTION      BREAST SURGERY Bilateral 12/05/2017    reconstruction  - tissue expanders     BREAST SURGERY Left 07/13/2018    tissue expander replaced    CHOLECYSTECTOMY      INSERTION OF BREAST IMPLANT Bilateral 10/18/2018    Procedure: INSERTION, BREAST IMPLANT;  Surgeon: Nelly Alarcon MD;  Location: [facility];  Service: Plastics;  Laterality: Bilateral;    LIPOSUCTION Bilateral 10/18/2018    Procedure: LIPOSUCTION;  Surgeon: Nelly Alarcon MD;  Location: [facility];  Service: Plastics;  Laterality: Bilateral;    MASTECTOMY Bilateral 12/05/2017    with lynph node removed on right    portacath removal  08/2018    TISSUE EXPANDER PLACEMENT Left 7/13/2018    Procedure: INSERTION, TISSUE EXPANDER;  Surgeon: Nelly Alarcon MD;  Location: [facility];  Service: Plastics;  Laterality: Left;    TISSUE EXPANDER REMOVAL Left 7/13/2018    Procedure: REMOVAL, TISSUE EXPANDER;  Surgeon: Nelly Alarcon MD;  Location: [facility];  Service: Plastics;  Laterality: Left;    TISSUE EXPANDER REMOVAL Bilateral 10/18/2018    Procedure: REMOVAL, TISSUE EXPANDER;  Surgeon: Nelly Alarcon MD;  Location: [facility];  Service: Plastics;  Laterality: Bilateral;    TONSILLECTOMY      9 yo   7.      8. Have you been fasting for at least 6 hours? Yes    9. Is there any chance you may be pregnant or breastfeeding? No    Assay: 11.84 MCi@:9.01   Injection  Site:lt hand     Residual: .684 mCi@: 9.03   Technologist: Carmel Elam Injected:11.15mCi

## 2022-03-09 ENCOUNTER — INFUSION (OUTPATIENT)
Dept: INFUSION THERAPY | Facility: HOSPITAL | Age: 67
End: 2022-03-09
Attending: INTERNAL MEDICINE
Payer: COMMERCIAL

## 2022-03-09 VITALS
RESPIRATION RATE: 18 BRPM | HEART RATE: 82 BPM | OXYGEN SATURATION: 96 % | SYSTOLIC BLOOD PRESSURE: 134 MMHG | DIASTOLIC BLOOD PRESSURE: 74 MMHG

## 2022-03-09 DIAGNOSIS — M81.8 OTHER OSTEOPOROSIS WITHOUT CURRENT PATHOLOGICAL FRACTURE: Primary | ICD-10-CM

## 2022-03-09 DIAGNOSIS — Z95.828 PORT-A-CATH IN PLACE: ICD-10-CM

## 2022-03-09 PROCEDURE — 96372 THER/PROPH/DIAG INJ SC/IM: CPT | Mod: PN

## 2022-03-09 PROCEDURE — 63600175 PHARM REV CODE 636 W HCPCS: Mod: JG,PN | Performed by: INTERNAL MEDICINE

## 2022-03-09 RX ADMIN — DENOSUMAB 60 MG: 60 INJECTION SUBCUTANEOUS at 01:03

## 2022-03-09 NOTE — PLAN OF CARE
Problem: Adult Inpatient Plan of Care  Goal: Plan of Care Review  Outcome: Ongoing, Progressing  Flowsheets (Taken 3/9/2022 1343)  Plan of Care Reviewed With: patient  Goal: Patient-Specific Goal (Individualized)  Outcome: Ongoing, Progressing     Problem: Electrolyte Imbalance  Goal: Electrolyte Balance  Outcome: Ongoing, Progressing   Pt tolerated prolia injection well.   No adverse reaction noted.  Pt left clinic in no acute distress.

## 2022-08-10 ENCOUNTER — PATIENT MESSAGE (OUTPATIENT)
Dept: HEMATOLOGY/ONCOLOGY | Facility: CLINIC | Age: 67
End: 2022-08-10
Payer: COMMERCIAL

## 2022-08-10 DIAGNOSIS — C50.811 MALIGNANT NEOPLASM OF OVERLAPPING SITES OF BOTH BREASTS IN FEMALE, ESTROGEN RECEPTOR POSITIVE: Primary | ICD-10-CM

## 2022-08-10 DIAGNOSIS — Z17.0 MALIGNANT NEOPLASM OF OVERLAPPING SITES OF BOTH BREASTS IN FEMALE, ESTROGEN RECEPTOR POSITIVE: Primary | ICD-10-CM

## 2022-08-10 DIAGNOSIS — C50.812 MALIGNANT NEOPLASM OF OVERLAPPING SITES OF BOTH BREASTS IN FEMALE, ESTROGEN RECEPTOR POSITIVE: Primary | ICD-10-CM

## 2022-09-08 ENCOUNTER — OFFICE VISIT (OUTPATIENT)
Dept: HEMATOLOGY/ONCOLOGY | Facility: CLINIC | Age: 67
End: 2022-09-08
Payer: COMMERCIAL

## 2022-09-08 ENCOUNTER — TELEPHONE (OUTPATIENT)
Dept: HEMATOLOGY/ONCOLOGY | Facility: CLINIC | Age: 67
End: 2022-09-08

## 2022-09-08 VITALS
HEIGHT: 64 IN | OXYGEN SATURATION: 95 % | DIASTOLIC BLOOD PRESSURE: 83 MMHG | SYSTOLIC BLOOD PRESSURE: 143 MMHG | TEMPERATURE: 98 F | RESPIRATION RATE: 16 BRPM | HEART RATE: 85 BPM | BODY MASS INDEX: 34.97 KG/M2 | WEIGHT: 204.81 LBS

## 2022-09-08 DIAGNOSIS — M81.0 AGE-RELATED OSTEOPOROSIS WITHOUT CURRENT PATHOLOGICAL FRACTURE: ICD-10-CM

## 2022-09-08 DIAGNOSIS — C50.811 MALIGNANT NEOPLASM OF OVERLAPPING SITES OF BOTH BREASTS IN FEMALE, ESTROGEN RECEPTOR POSITIVE: Primary | ICD-10-CM

## 2022-09-08 DIAGNOSIS — Z17.0 MALIGNANT NEOPLASM OF OVERLAPPING SITES OF BOTH BREASTS IN FEMALE, ESTROGEN RECEPTOR POSITIVE: Primary | ICD-10-CM

## 2022-09-08 DIAGNOSIS — C50.812 MALIGNANT NEOPLASM OF OVERLAPPING SITES OF BOTH BREASTS IN FEMALE, ESTROGEN RECEPTOR POSITIVE: Primary | ICD-10-CM

## 2022-09-08 PROCEDURE — 1101F PT FALLS ASSESS-DOCD LE1/YR: CPT | Mod: CPTII,S$GLB,, | Performed by: STUDENT IN AN ORGANIZED HEALTH CARE EDUCATION/TRAINING PROGRAM

## 2022-09-08 PROCEDURE — 3008F BODY MASS INDEX DOCD: CPT | Mod: CPTII,S$GLB,, | Performed by: STUDENT IN AN ORGANIZED HEALTH CARE EDUCATION/TRAINING PROGRAM

## 2022-09-08 PROCEDURE — 1159F MED LIST DOCD IN RCRD: CPT | Mod: CPTII,S$GLB,, | Performed by: STUDENT IN AN ORGANIZED HEALTH CARE EDUCATION/TRAINING PROGRAM

## 2022-09-08 PROCEDURE — 3008F PR BODY MASS INDEX (BMI) DOCUMENTED: ICD-10-PCS | Mod: CPTII,S$GLB,, | Performed by: STUDENT IN AN ORGANIZED HEALTH CARE EDUCATION/TRAINING PROGRAM

## 2022-09-08 PROCEDURE — 1159F PR MEDICATION LIST DOCUMENTED IN MEDICAL RECORD: ICD-10-PCS | Mod: CPTII,S$GLB,, | Performed by: STUDENT IN AN ORGANIZED HEALTH CARE EDUCATION/TRAINING PROGRAM

## 2022-09-08 PROCEDURE — 99215 PR OFFICE/OUTPT VISIT, EST, LEVL V, 40-54 MIN: ICD-10-PCS | Mod: S$GLB,,, | Performed by: STUDENT IN AN ORGANIZED HEALTH CARE EDUCATION/TRAINING PROGRAM

## 2022-09-08 PROCEDURE — 99999 PR PBB SHADOW E&M-EST. PATIENT-LVL IV: CPT | Mod: PBBFAC,,, | Performed by: STUDENT IN AN ORGANIZED HEALTH CARE EDUCATION/TRAINING PROGRAM

## 2022-09-08 PROCEDURE — 3288F PR FALLS RISK ASSESSMENT DOCUMENTED: ICD-10-PCS | Mod: CPTII,S$GLB,, | Performed by: STUDENT IN AN ORGANIZED HEALTH CARE EDUCATION/TRAINING PROGRAM

## 2022-09-08 PROCEDURE — 1160F RVW MEDS BY RX/DR IN RCRD: CPT | Mod: CPTII,S$GLB,, | Performed by: STUDENT IN AN ORGANIZED HEALTH CARE EDUCATION/TRAINING PROGRAM

## 2022-09-08 PROCEDURE — 99999 PR PBB SHADOW E&M-EST. PATIENT-LVL IV: ICD-10-PCS | Mod: PBBFAC,,, | Performed by: STUDENT IN AN ORGANIZED HEALTH CARE EDUCATION/TRAINING PROGRAM

## 2022-09-08 PROCEDURE — 3077F SYST BP >= 140 MM HG: CPT | Mod: CPTII,S$GLB,, | Performed by: STUDENT IN AN ORGANIZED HEALTH CARE EDUCATION/TRAINING PROGRAM

## 2022-09-08 PROCEDURE — 99215 OFFICE O/P EST HI 40 MIN: CPT | Mod: S$GLB,,, | Performed by: STUDENT IN AN ORGANIZED HEALTH CARE EDUCATION/TRAINING PROGRAM

## 2022-09-08 PROCEDURE — 3079F PR MOST RECENT DIASTOLIC BLOOD PRESSURE 80-89 MM HG: ICD-10-PCS | Mod: CPTII,S$GLB,, | Performed by: STUDENT IN AN ORGANIZED HEALTH CARE EDUCATION/TRAINING PROGRAM

## 2022-09-08 PROCEDURE — 1101F PR PT FALLS ASSESS DOC 0-1 FALLS W/OUT INJ PAST YR: ICD-10-PCS | Mod: CPTII,S$GLB,, | Performed by: STUDENT IN AN ORGANIZED HEALTH CARE EDUCATION/TRAINING PROGRAM

## 2022-09-08 PROCEDURE — 1160F PR REVIEW ALL MEDS BY PRESCRIBER/CLIN PHARMACIST DOCUMENTED: ICD-10-PCS | Mod: CPTII,S$GLB,, | Performed by: STUDENT IN AN ORGANIZED HEALTH CARE EDUCATION/TRAINING PROGRAM

## 2022-09-08 PROCEDURE — 3288F FALL RISK ASSESSMENT DOCD: CPT | Mod: CPTII,S$GLB,, | Performed by: STUDENT IN AN ORGANIZED HEALTH CARE EDUCATION/TRAINING PROGRAM

## 2022-09-08 PROCEDURE — 3079F DIAST BP 80-89 MM HG: CPT | Mod: CPTII,S$GLB,, | Performed by: STUDENT IN AN ORGANIZED HEALTH CARE EDUCATION/TRAINING PROGRAM

## 2022-09-08 PROCEDURE — 3077F PR MOST RECENT SYSTOLIC BLOOD PRESSURE >= 140 MM HG: ICD-10-PCS | Mod: CPTII,S$GLB,, | Performed by: STUDENT IN AN ORGANIZED HEALTH CARE EDUCATION/TRAINING PROGRAM

## 2022-09-08 NOTE — PROGRESS NOTES
Subjective:     Patient ID:    Name: Vilma Cross  : 1955  MRN: [id number]    HPI:   Vilma Cross is a 67 y.o. female presents for evaluation of Follow-up    Patient previously was seen Dr Mcintosh, last visit in 2021; diagnosed with stage II IDC in 2017 s/p B/L mastectomies by Dr Mcneil, pT2N1(mi)(sn) 1 of 3 LN positive on the right side (ER positivity, MO positivity, and HER-2 negativity by FISH). negative left senile Lymph node with left breast consistent focal low-grade LCIS and DCIS on the left side, but no invasive component. Completed ACx2/ T 12 weeks in 2108 and defer XRT. Since then patient have been on anastrazole daily     Today, patient is taking her anastrazole daily, minimal side effect, had PET/CT with [facility] and blood work with no evidence of active disease. Patient is experience myalgia/fatigued/weight gain with anastrazole and still have neuropathy from the taxol but nothing is worsening, stable.     Oncology History   Malignant neoplasm of overlapping sites of both breasts in female, estrogen receptor positive   2017 Initial Diagnosis    Malignant neoplasm of overlapping sites of both breasts in female, estrogen receptor positive     2017 Cancer Staged    Staging form: Onc Breast AJCC V7  - Pathologic stage from 2017: Stage IIB (T2, N1, cM0)          Past Medical History:   Diagnosis Date    Breast cancer 2017    bilateral    Hyperlipidemia     no meds    Hypertension     Neuropathy of both feet     Neuropathy of finger     fingers and toes    Osteoporosis     Seasonal allergies        Past Surgical History:   Procedure Laterality Date    AUGMENTATION OF BREAST      BREAST BIOPSY Bilateral     BREAST BIOPSY Right     BREAST CYST ASPIRATION Left     BREAST RECONSTRUCTION      BREAST SURGERY Bilateral 2017    reconstruction  - tissue expanders     BREAST SURGERY Left 2018    tissue expander replaced    CHOLECYSTECTOMY       INSERTION OF BREAST IMPLANT Bilateral 10/18/2018    Procedure: INSERTION, BREAST IMPLANT;  Surgeon: Nelly Alarcon MD;  Location: [facility];  Service: Plastics;  Laterality: Bilateral;    LIPOSUCTION Bilateral 10/18/2018    Procedure: LIPOSUCTION;  Surgeon: Nelly Alarcon MD;  Location: [facility];  Service: Plastics;  Laterality: Bilateral;    MASTECTOMY Bilateral 12/05/2017    with lynph node removed on right    portacath removal  08/2018    TISSUE EXPANDER PLACEMENT Left 7/13/2018    Procedure: INSERTION, TISSUE EXPANDER;  Surgeon: Nelly Alarcon MD;  Location: [facility];  Service: Plastics;  Laterality: Left;    TISSUE EXPANDER REMOVAL Left 7/13/2018    Procedure: REMOVAL, TISSUE EXPANDER;  Surgeon: Nelly Alarcon MD;  Location: [facility];  Service: Plastics;  Laterality: Left;    TISSUE EXPANDER REMOVAL Bilateral 10/18/2018    Procedure: REMOVAL, TISSUE EXPANDER;  Surgeon: Nelly Alarcon MD;  Location: [facility];  Service: Plastics;  Laterality: Bilateral;    TONSILLECTOMY      7 yo       Family History   Problem Relation Age of Onset    Heart disease Mother     Hyperlipidemia Mother     Heart disease Father     Hypertension Father     Hyperlipidemia Father     Stroke Father     Gout Father     Asthma Son        Social History     Socioeconomic History    Marital status:    Tobacco Use    Smoking status: Never    Smokeless tobacco: Never   Substance and Sexual Activity    Alcohol use: Yes     Alcohol/week: 0.0 standard drinks     Comment: Occasionaly: Daiquairi once per year    Drug use: No    Sexual activity: Yes     Partners: Male       Review of patient's allergies indicates:   Allergen Reactions    Hydromorphone Other (See Comments)     Numbness from the neck down, followed by severe nausea and vomiting    Penicillins Other (See Comments)     Sores in mouth, throat, and face       Review of Systems   Constitutional: Positive for weight gain. Negative for decreased appetite, fever and night  "sweats.   Eyes:  Negative for blurred vision, double vision, pain and visual disturbance.   Cardiovascular:  Negative for chest pain.   Respiratory:  Negative for cough and shortness of breath.    Hematologic/Lymphatic: Negative for adenopathy.   Skin:  Negative for rash.   Musculoskeletal:  Positive for muscle cramps and myalgias. Negative for back pain.   Gastrointestinal:  Negative for abdominal pain and diarrhea.   Genitourinary:  Negative for frequency.   Neurological:  Negative for headaches.   Psychiatric/Behavioral:  The patient is not nervous/anxious.           Objective:     Vitals:    09/08/22 1051   BP: (!) 143/83   BP Location: Left arm   Patient Position: Sitting   BP Method: Large (Automatic)   Pulse: 85   Resp: 16   Temp: 98.2 °F (36.8 °C)   TempSrc: Temporal   SpO2: 95%   Weight: 92.9 kg (204 lb 12.9 oz)   Height: 5' 4" (1.626 m)       Physical Exam  Constitutional:       General: She is not in acute distress.     Appearance: Normal appearance. She is normal weight.   HENT:      Head: Normocephalic and atraumatic.   Cardiovascular:      Rate and Rhythm: Normal rate and regular rhythm.      Heart sounds: Normal heart sounds.   Pulmonary:      Effort: Pulmonary effort is normal.      Breath sounds: Normal breath sounds. No wheezing.   Chest:      Chest wall: No tenderness.   Abdominal:      General: Abdomen is flat. Bowel sounds are normal. There is no distension.      Palpations: Abdomen is soft. There is no mass.   Musculoskeletal:      Right lower leg: No edema.   Lymphadenopathy:      Cervical: No cervical adenopathy.   Skin:     Coloration: Skin is not jaundiced or pale.   Psychiatric:         Mood and Affect: Mood normal.         Behavior: Behavior normal.         Current Outpatient Medications on File Prior to Visit   Medication Sig    amLODIPine (NORVASC) 10 MG tablet Take 1 tablet (10 mg total) by mouth once daily.    anastrozole (ARIMIDEX) 1 mg Tab Take 1 tablet (1 mg total) by mouth once " daily.    b complex vitamins tablet Take 1 tablet by mouth once daily.    calcium carbonate/vitamin D3 (CALCIUM 500 + D, D3, ORAL) Take 600 mg by mouth 2 (two) times a day.     denosumab (PROLIA) 60 mg/mL Syrg Inject 1 mL (60 mg total) into the skin every 6 (six) months.    hydroCHLOROthiazide (HYDRODIURIL) 25 MG tablet Take 1 tablet (25 mg total) by mouth once daily.    promethazine (PHENERGAN) 6.25 mg/5 mL syrup Take 5 mLs (6.25 mg total) by mouth nightly as needed (cough). (Patient not taking: Reported on 9/8/2022)     No current facility-administered medications on file prior to visit.       CBC:  Lab Results   Component Value Date    WBC 7.36 03/02/2022    HGB 15.2 03/02/2022    HCT 45.8 03/02/2022    MCV 90 03/02/2022     03/02/2022         CMP:  Sodium   Date Value Ref Range Status   03/02/2022 141 136 - 145 mmol/L Final     Potassium   Date Value Ref Range Status   03/02/2022 3.6 3.5 - 5.1 mmol/L Final     Chloride   Date Value Ref Range Status   03/02/2022 101 95 - 110 mmol/L Final     CO2   Date Value Ref Range Status   03/02/2022 33 (H) 22 - 31 mmol/L Final     Glucose   Date Value Ref Range Status   03/02/2022 114 (H) 70 - 110 mg/dL Final     Comment:     The ADA recommends the following guidelines for fasting glucose:    Normal:       less than 100 mg/dL    Prediabetes:  100 mg/dL to 125 mg/dL    Diabetes:     126 mg/dL or higher       BUN   Date Value Ref Range Status   03/02/2022 11 7 - 18 mg/dL Final     Creatinine   Date Value Ref Range Status   03/02/2022 0.70 0.50 - 1.40 mg/dL Final     Calcium   Date Value Ref Range Status   03/02/2022 9.3 8.4 - 10.2 mg/dL Final     Total Protein   Date Value Ref Range Status   03/02/2022 7.2 6.0 - 8.4 g/dL Final     Albumin   Date Value Ref Range Status   03/02/2022 4.3 3.5 - 5.2 g/dL Final     Total Bilirubin   Date Value Ref Range Status   03/02/2022 0.4 0.2 - 1.3 mg/dL Final     Alkaline Phosphatase   Date Value Ref Range Status   03/02/2022 64 38 -  145 U/L Final     AST ([place])   Date Value Ref Range Status   01/18/2016 22 14 - 36 U/L Final     AST   Date Value Ref Range Status   03/02/2022 26 14 - 36 U/L Final     ALT   Date Value Ref Range Status   03/02/2022 24 0 - 35 U/L Final     Anion Gap   Date Value Ref Range Status   03/02/2022 7 (L) 8 - 16 mmol/L Final     eGFR if    Date Value Ref Range Status   03/02/2022 >60 >60 mL/min/1.73 m^2 Final     eGFR if non    Date Value Ref Range Status   03/02/2022 >60 >60 mL/min/1.73 m^2 Final     Comment:     Calculation used to obtain the estimated glomerular filtration  rate (eGFR) is the CKD-EPI equation.          US Breast Bilateral Complete  Narrative: Result:   US Breast Bilateral Complete     History:  Patient is 67 y.o. and is seen for annual diagnostic imaging. Today she   reports bilateral breast rippling and fullness, worse on the right than   the left, which is not a new finding. Diagnostic ultrasound and MRI was   performed in 2021 with no suspicious findings reported.     She has a personal history of bilateral breast cancer (right lower outer   quadrant IDC, right upper outer quadrant ILC, left DCIS, one right   sentinel lymph node with positive ILC micromet) diagnosed in 2017 post   bilateral mastectomy and tissue expander placement. Her LEFT tissue   expander was replaced in August 2020 with complication requiring a seroma   aspiration.    Films Compared:  Compared to: 06/24/2021 US Breast Bilateral Complete, 06/09/2020 US Breast   Biopsy with Imaging 1st site Right, 06/08/2020 US Breast Right Complete,   and 11/02/2017 US Breast Bilateral Complete     Findings: All four quadrants, the retroareolar, and the axillary regions   were scanned.     There are post-surgical findings from a previous mastectomy with implant   reconstruction seen in both breasts. There has been no interval   development of a suspicious mass, fluid collection, or architectural    distortion.  There is no abnormality seen at site of prior   ultrasound-guided biopsy in the right breast 9:00 12 cm from the nipple.   No abnormal nodes are seen in the axillae.   Impression: Bilateral  There is no sonographic evidence of malignancy.     I discussed the findings and recommendation in detail with Vilma Cross at the time of the study and reassured her.    BI-RADS Category:   Overall: 2 - Benign     Recommendation:  Screening Breast Ultrasound In 1 year is recommended for both breasts.   Clinical follow-up as needed. If there are new associated features or   worsening of symptoms, then follow-up imaging may be considered.        ECOG SCORE    0 - Fully active-able to carry on all pre-disease performance without restriction          All pertinent labs and imaging reviewed.    Assessment:       1. Malignant neoplasm of overlapping sites of both breasts in female, estrogen receptor positive    2. Age-related osteoporosis without current pathological fracture        # Stage IIA IDC of the right breast s/p B/l Mastectomy   - ER/OR+, HER-2 negative, with N1 (micro) on the right side   - s/p adjuvant chemotherapy AC/T in 2018, started on anastrozole daily, plan fr 5 year  - discussed with patient the possibility of cont it for 7.5 -10 years given her stage II disease and the fact that she did not get XRT, will send out Breast cancer index to assist in the discussion about cont beyond 7 years   - Getting PET/CT; last one today with [facility], explain that Pet might not be the best imaging to monitor breast cancer and will hold off getting routine imaging and just get if needed from symptoms   - Blood work with [facility], cont annually     # Osteoporosis:   - age related worsening with AI, on prolia, cont   - last DEXA scan in 2021, repeating one in 2/2023   - cont with Vit D and calcium     # Chronic use of AI:  - stable symptoms, not worsening, stable, cont taking it and will discuss further on next visit      Plan:     Malignant neoplasm of overlapping sites of both breasts in female, estrogen receptor positive  -     CMP; Future; Expected date: 09/08/2022  -     BONE MIN DENSITY; Future; Expected date: 09/08/2022    Age-related osteoporosis without current pathological fracture  -     BONE MIN DENSITY; Future; Expected date: 09/08/2022      Patient queried and all questions were answered.    Plan was discussed with the patient at length, and she verbalized understanding.    Recommend  exercise, nutrition and weight management and health maintenance activities and follow-up with PCPs recommendations     Route Chart for Scheduling    Med Onc Chart Routing      Follow up with physician 6 months. with MD, please schedule same day as prolia injection, DEXA scan prior to next radha   Follow up with [facility]    Infusion scheduling note    Injection scheduling note    Labs CMP   Lab interval:  Today   Imaging DXA scan      Pharmacy appointment    Other referrals          Therapy Plan Information  5. Medications  denosumab (PROLIA) injection 60 mg  60 mg, Subcutaneous, Every visit      Signed:  Dang Benitez MD  Hematology and Oncology  [facility]     Answers submitted by the patient for this visit:  Review of Systems Questionnaire (Submitted on 9/6/2022)  appetite change : No  unexpected weight change: No  mouth sores: No

## 2022-09-09 ENCOUNTER — INFUSION (OUTPATIENT)
Dept: INFUSION THERAPY | Facility: HOSPITAL | Age: 67
End: 2022-09-09
Attending: STUDENT IN AN ORGANIZED HEALTH CARE EDUCATION/TRAINING PROGRAM
Payer: COMMERCIAL

## 2022-09-09 VITALS — HEART RATE: 84 BPM | RESPIRATION RATE: 16 BRPM | DIASTOLIC BLOOD PRESSURE: 79 MMHG | SYSTOLIC BLOOD PRESSURE: 132 MMHG

## 2022-09-09 DIAGNOSIS — M81.8 OTHER OSTEOPOROSIS WITHOUT CURRENT PATHOLOGICAL FRACTURE: Primary | ICD-10-CM

## 2022-09-09 DIAGNOSIS — Z95.828 PORT-A-CATH IN PLACE: ICD-10-CM

## 2022-09-09 PROCEDURE — 96372 THER/PROPH/DIAG INJ SC/IM: CPT | Mod: PN

## 2022-09-09 PROCEDURE — 63600175 PHARM REV CODE 636 W HCPCS: Mod: JG,PN | Performed by: STUDENT IN AN ORGANIZED HEALTH CARE EDUCATION/TRAINING PROGRAM

## 2022-09-09 RX ADMIN — DENOSUMAB 60 MG: 60 INJECTION SUBCUTANEOUS at 01:09

## 2022-09-09 NOTE — PLAN OF CARE
Problem: Adult Inpatient Plan of Care  Goal: Plan of Care Review  Outcome: Met     Problem: Fatigue  Goal: Improved Activity Tolerance  Outcome: Met   Tolerated injection well today  Discharge instructions given and pt d/c to home per w/c  NAD

## 2022-09-16 ENCOUNTER — TELEPHONE (OUTPATIENT)
Dept: HEMATOLOGY/ONCOLOGY | Facility: CLINIC | Age: 67
End: 2022-09-16
Payer: COMMERCIAL

## 2022-09-16 NOTE — TELEPHONE ENCOUNTER
Received a call from SUSAN with [email or web address], she states that they received two blocks from Pathology and she would like to know which block Dr Benitez would like to run the test from. Per Dr Benitez, notified SUSAN that she would like the test run on the Invasive Lobular Carcinoma block. SUSAN verbalized understanding.    SUSAN with [email or web address]- [phone]

## 2023-03-07 ENCOUNTER — PATIENT MESSAGE (OUTPATIENT)
Dept: HEMATOLOGY/ONCOLOGY | Facility: CLINIC | Age: 68
End: 2023-03-07
Payer: COMMERCIAL

## 2023-03-09 ENCOUNTER — INFUSION (OUTPATIENT)
Dept: INFUSION THERAPY | Facility: HOSPITAL | Age: 68
End: 2023-03-09
Attending: INTERNAL MEDICINE
Payer: COMMERCIAL

## 2023-03-09 ENCOUNTER — OFFICE VISIT (OUTPATIENT)
Dept: HEMATOLOGY/ONCOLOGY | Facility: CLINIC | Age: 68
End: 2023-03-09
Payer: COMMERCIAL

## 2023-03-09 VITALS
BODY MASS INDEX: 34.4 KG/M2 | HEIGHT: 64 IN | HEART RATE: 80 BPM | WEIGHT: 201.5 LBS | OXYGEN SATURATION: 96 % | RESPIRATION RATE: 16 BRPM | SYSTOLIC BLOOD PRESSURE: 165 MMHG | DIASTOLIC BLOOD PRESSURE: 92 MMHG | TEMPERATURE: 97 F

## 2023-03-09 VITALS
SYSTOLIC BLOOD PRESSURE: 165 MMHG | HEART RATE: 80 BPM | TEMPERATURE: 97 F | RESPIRATION RATE: 16 BRPM | BODY MASS INDEX: 34.4 KG/M2 | HEIGHT: 64 IN | DIASTOLIC BLOOD PRESSURE: 92 MMHG | OXYGEN SATURATION: 96 % | WEIGHT: 201.5 LBS

## 2023-03-09 DIAGNOSIS — T45.1X5A ANTINEOPLASTIC CHEMOTHERAPY INDUCED ANEMIA: ICD-10-CM

## 2023-03-09 DIAGNOSIS — D70.1 CHEMOTHERAPY INDUCED NEUTROPENIA: Primary | ICD-10-CM

## 2023-03-09 DIAGNOSIS — D64.81 ANTINEOPLASTIC CHEMOTHERAPY INDUCED ANEMIA: ICD-10-CM

## 2023-03-09 DIAGNOSIS — M81.8 OTHER OSTEOPOROSIS WITHOUT CURRENT PATHOLOGICAL FRACTURE: Primary | ICD-10-CM

## 2023-03-09 DIAGNOSIS — C50.811 MALIGNANT NEOPLASM OF OVERLAPPING SITES OF BOTH BREASTS IN FEMALE, ESTROGEN RECEPTOR POSITIVE: ICD-10-CM

## 2023-03-09 DIAGNOSIS — C50.812 MALIGNANT NEOPLASM OF OVERLAPPING SITES OF BOTH BREASTS IN FEMALE, ESTROGEN RECEPTOR POSITIVE: ICD-10-CM

## 2023-03-09 DIAGNOSIS — M81.0 AGE-RELATED OSTEOPOROSIS WITHOUT CURRENT PATHOLOGICAL FRACTURE: ICD-10-CM

## 2023-03-09 DIAGNOSIS — E66.01 CLASS 2 SEVERE OBESITY DUE TO EXCESS CALORIES WITH SERIOUS COMORBIDITY AND BODY MASS INDEX (BMI) OF 35.0 TO 35.9 IN ADULT: ICD-10-CM

## 2023-03-09 DIAGNOSIS — Z95.828 PORT-A-CATH IN PLACE: ICD-10-CM

## 2023-03-09 DIAGNOSIS — T45.1X5A CHEMOTHERAPY INDUCED NEUTROPENIA: Primary | ICD-10-CM

## 2023-03-09 DIAGNOSIS — Z79.811 AROMATASE INHIBITOR USE: ICD-10-CM

## 2023-03-09 DIAGNOSIS — Z17.0 MALIGNANT NEOPLASM OF OVERLAPPING SITES OF BOTH BREASTS IN FEMALE, ESTROGEN RECEPTOR POSITIVE: ICD-10-CM

## 2023-03-09 PROCEDURE — 99215 PR OFFICE/OUTPT VISIT, EST, LEVL V, 40-54 MIN: ICD-10-PCS | Mod: S$GLB,,, | Performed by: STUDENT IN AN ORGANIZED HEALTH CARE EDUCATION/TRAINING PROGRAM

## 2023-03-09 PROCEDURE — 3288F PR FALLS RISK ASSESSMENT DOCUMENTED: ICD-10-PCS | Mod: CPTII,S$GLB,, | Performed by: STUDENT IN AN ORGANIZED HEALTH CARE EDUCATION/TRAINING PROGRAM

## 2023-03-09 PROCEDURE — 96372 THER/PROPH/DIAG INJ SC/IM: CPT | Mod: PN

## 2023-03-09 PROCEDURE — 1160F PR REVIEW ALL MEDS BY PRESCRIBER/CLIN PHARMACIST DOCUMENTED: ICD-10-PCS | Mod: CPTII,S$GLB,, | Performed by: STUDENT IN AN ORGANIZED HEALTH CARE EDUCATION/TRAINING PROGRAM

## 2023-03-09 PROCEDURE — 3080F DIAST BP >= 90 MM HG: CPT | Mod: CPTII,S$GLB,, | Performed by: STUDENT IN AN ORGANIZED HEALTH CARE EDUCATION/TRAINING PROGRAM

## 2023-03-09 PROCEDURE — 1126F AMNT PAIN NOTED NONE PRSNT: CPT | Mod: CPTII,S$GLB,, | Performed by: STUDENT IN AN ORGANIZED HEALTH CARE EDUCATION/TRAINING PROGRAM

## 2023-03-09 PROCEDURE — 63600175 PHARM REV CODE 636 W HCPCS: Mod: JZ,JG,PN | Performed by: STUDENT IN AN ORGANIZED HEALTH CARE EDUCATION/TRAINING PROGRAM

## 2023-03-09 PROCEDURE — 1101F PR PT FALLS ASSESS DOC 0-1 FALLS W/OUT INJ PAST YR: ICD-10-PCS | Mod: CPTII,S$GLB,, | Performed by: STUDENT IN AN ORGANIZED HEALTH CARE EDUCATION/TRAINING PROGRAM

## 2023-03-09 PROCEDURE — 1159F MED LIST DOCD IN RCRD: CPT | Mod: CPTII,S$GLB,, | Performed by: STUDENT IN AN ORGANIZED HEALTH CARE EDUCATION/TRAINING PROGRAM

## 2023-03-09 PROCEDURE — 3008F PR BODY MASS INDEX (BMI) DOCUMENTED: ICD-10-PCS | Mod: CPTII,S$GLB,, | Performed by: STUDENT IN AN ORGANIZED HEALTH CARE EDUCATION/TRAINING PROGRAM

## 2023-03-09 PROCEDURE — 1159F PR MEDICATION LIST DOCUMENTED IN MEDICAL RECORD: ICD-10-PCS | Mod: CPTII,S$GLB,, | Performed by: STUDENT IN AN ORGANIZED HEALTH CARE EDUCATION/TRAINING PROGRAM

## 2023-03-09 PROCEDURE — 3288F FALL RISK ASSESSMENT DOCD: CPT | Mod: CPTII,S$GLB,, | Performed by: STUDENT IN AN ORGANIZED HEALTH CARE EDUCATION/TRAINING PROGRAM

## 2023-03-09 PROCEDURE — 3077F PR MOST RECENT SYSTOLIC BLOOD PRESSURE >= 140 MM HG: ICD-10-PCS | Mod: CPTII,S$GLB,, | Performed by: STUDENT IN AN ORGANIZED HEALTH CARE EDUCATION/TRAINING PROGRAM

## 2023-03-09 PROCEDURE — 1126F PR PAIN SEVERITY QUANTIFIED, NO PAIN PRESENT: ICD-10-PCS | Mod: CPTII,S$GLB,, | Performed by: STUDENT IN AN ORGANIZED HEALTH CARE EDUCATION/TRAINING PROGRAM

## 2023-03-09 PROCEDURE — 99999 PR PBB SHADOW E&M-EST. PATIENT-LVL IV: CPT | Mod: PBBFAC,,, | Performed by: STUDENT IN AN ORGANIZED HEALTH CARE EDUCATION/TRAINING PROGRAM

## 2023-03-09 PROCEDURE — 1101F PT FALLS ASSESS-DOCD LE1/YR: CPT | Mod: CPTII,S$GLB,, | Performed by: STUDENT IN AN ORGANIZED HEALTH CARE EDUCATION/TRAINING PROGRAM

## 2023-03-09 PROCEDURE — 3080F PR MOST RECENT DIASTOLIC BLOOD PRESSURE >= 90 MM HG: ICD-10-PCS | Mod: CPTII,S$GLB,, | Performed by: STUDENT IN AN ORGANIZED HEALTH CARE EDUCATION/TRAINING PROGRAM

## 2023-03-09 PROCEDURE — 1160F RVW MEDS BY RX/DR IN RCRD: CPT | Mod: CPTII,S$GLB,, | Performed by: STUDENT IN AN ORGANIZED HEALTH CARE EDUCATION/TRAINING PROGRAM

## 2023-03-09 PROCEDURE — 99215 OFFICE O/P EST HI 40 MIN: CPT | Mod: S$GLB,,, | Performed by: STUDENT IN AN ORGANIZED HEALTH CARE EDUCATION/TRAINING PROGRAM

## 2023-03-09 PROCEDURE — 3008F BODY MASS INDEX DOCD: CPT | Mod: CPTII,S$GLB,, | Performed by: STUDENT IN AN ORGANIZED HEALTH CARE EDUCATION/TRAINING PROGRAM

## 2023-03-09 PROCEDURE — 99999 PR PBB SHADOW E&M-EST. PATIENT-LVL IV: ICD-10-PCS | Mod: PBBFAC,,, | Performed by: STUDENT IN AN ORGANIZED HEALTH CARE EDUCATION/TRAINING PROGRAM

## 2023-03-09 PROCEDURE — 3077F SYST BP >= 140 MM HG: CPT | Mod: CPTII,S$GLB,, | Performed by: STUDENT IN AN ORGANIZED HEALTH CARE EDUCATION/TRAINING PROGRAM

## 2023-03-09 RX ADMIN — DENOSUMAB 60 MG: 60 INJECTION SUBCUTANEOUS at 12:03

## 2023-03-09 NOTE — PROGRESS NOTES
Subjective:     Patient ID:    Name: Vilma Cross  : 1955  MRN: [id number]    HPI:   Vilma Cross is a 68 y.o. female presents for evaluation of Malignant neoplasm of overlapping sites of both breasts in  (6 month follow up)    Patient previously was seen Dr Mcintosh, last visit in 2021; diagnosed with stage II IDC in 2017 s/p B/L mastectomies by Dr Mcneil, pT2N1(mi)(sn) 1 of 3 LN positive on the right side (ER positivity, IA positivity, and HER-2 negativity by FISH). negative left senile Lymph node with left breast consistent focal low-grade LCIS and DCIS on the left side, but no invasive component. Completed ACx2/ T 12 weeks in 2108 and defer XRT. Since then patient have been on anastrazole daily sicne 2018     Today, patient is taking her anastrazole daily, minimal side effect,   - last PET/CT 3/2022 with [facility]   - blood work with no evidence of active disease.   - Patient is experience myalgia/fatigued/weight gain with anastrazole and still have neuropathy from the taxol but nothing is worsening, stable.   - discussion about 5 vs 10 years and the need for BCI to determine, given her LN+ would favorable 10 years      Oncology History   Malignant neoplasm of overlapping sites of both breasts in female, estrogen receptor positive   2017 Initial Diagnosis    Malignant neoplasm of overlapping sites of both breasts in female, estrogen receptor positive     2017 Cancer Staged    Staging form: Onc Breast AJCC V7  - Pathologic stage from 2017: Stage IIB (T2, N1, cM0)          Past Medical History:   Diagnosis Date    Breast cancer 2017    bilateral    Hyperlipidemia     no meds    Hypertension     Neuropathy of both feet     Neuropathy of finger     fingers and toes    Osteoporosis     Seasonal allergies        Past Surgical History:   Procedure Laterality Date    AUGMENTATION OF BREAST      BREAST BIOPSY Bilateral 2017    BREAST BIOPSY Right 2020    BREAST CYST  ASPIRATION Left 2018    BREAST RECONSTRUCTION      BREAST SURGERY Bilateral 12/05/2017    reconstruction  - tissue expanders     BREAST SURGERY Left 07/13/2018    tissue expander replaced    CHOLECYSTECTOMY      INSERTION OF BREAST IMPLANT Bilateral 10/18/2018    Procedure: INSERTION, BREAST IMPLANT;  Surgeon: Nelly Alarcon MD;  Location: [facility];  Service: Plastics;  Laterality: Bilateral;    LIPOSUCTION Bilateral 10/18/2018    Procedure: LIPOSUCTION;  Surgeon: Nelly Alarcon MD;  Location: [facility];  Service: Plastics;  Laterality: Bilateral;    MASTECTOMY Bilateral 12/05/2017    with lynph node removed on right    portacath removal  08/2018    TISSUE EXPANDER PLACEMENT Left 7/13/2018    Procedure: INSERTION, TISSUE EXPANDER;  Surgeon: Nelly Alarcon MD;  Location: [facility];  Service: Plastics;  Laterality: Left;    TISSUE EXPANDER REMOVAL Left 7/13/2018    Procedure: REMOVAL, TISSUE EXPANDER;  Surgeon: Nelly Alarcon MD;  Location: [facility];  Service: Plastics;  Laterality: Left;    TISSUE EXPANDER REMOVAL Bilateral 10/18/2018    Procedure: REMOVAL, TISSUE EXPANDER;  Surgeon: Nelly Alarcon MD;  Location: [facility];  Service: Plastics;  Laterality: Bilateral;    TONSILLECTOMY      7 yo       Family History   Problem Relation Age of Onset    Heart disease Mother     Hyperlipidemia Mother     Heart disease Father     Hypertension Father     Hyperlipidemia Father     Stroke Father     Gout Father     Asthma Son        Social History     Socioeconomic History    Marital status:    Tobacco Use    Smoking status: Never    Smokeless tobacco: Never   Substance and Sexual Activity    Alcohol use: Yes     Alcohol/week: 0.0 standard drinks     Comment: Occasionaly: Daiquairi once per year    Drug use: No    Sexual activity: Yes     Partners: Male       Review of patient's allergies indicates:   Allergen Reactions    Hydromorphone Other (See Comments)     Numbness from the neck down, followed by severe  "nausea and vomiting    Penicillins Other (See Comments)     Sores in mouth, throat, and face       Review of Systems   Constitutional: Positive for weight gain. Negative for decreased appetite, fever and night sweats.   Eyes:  Negative for blurred vision, double vision, pain and visual disturbance.   Cardiovascular:  Negative for chest pain.   Respiratory:  Negative for cough and shortness of breath.    Hematologic/Lymphatic: Negative for adenopathy.   Skin:  Negative for rash.   Musculoskeletal:  Positive for muscle cramps and myalgias. Negative for back pain.   Gastrointestinal:  Negative for abdominal pain and diarrhea.   Genitourinary:  Negative for frequency.   Neurological:  Negative for headaches.   Psychiatric/Behavioral:  The patient is not nervous/anxious.           Objective:     Vitals:    03/09/23 1142   BP: (!) 165/92   BP Location: Left arm   Patient Position: Sitting   BP Method: Large (Automatic)   Pulse: 80   Resp: 16   Temp: 96.6 °F (35.9 °C)   TempSrc: Temporal   SpO2: 96%   Weight: 91.4 kg (201 lb 8 oz)   Height: 5' 4" (1.626 m)       Physical Exam  Constitutional:       General: She is not in acute distress.     Appearance: Normal appearance. She is normal weight.   HENT:      Head: Normocephalic and atraumatic.   Cardiovascular:      Rate and Rhythm: Normal rate and regular rhythm.      Heart sounds: Normal heart sounds.   Pulmonary:      Effort: Pulmonary effort is normal.      Breath sounds: Normal breath sounds. No wheezing.   Chest:      Chest wall: No tenderness.   Abdominal:      General: Abdomen is flat. Bowel sounds are normal. There is no distension.      Palpations: Abdomen is soft. There is no mass.   Musculoskeletal:      Right lower leg: No edema.   Lymphadenopathy:      Cervical: No cervical adenopathy.   Skin:     Coloration: Skin is not jaundiced or pale.   Psychiatric:         Mood and Affect: Mood normal.         Behavior: Behavior normal.         Current Outpatient " Medications on File Prior to Visit   Medication Sig    amLODIPine (NORVASC) 10 MG tablet TAKE 1 TABLET BY MOUTH once daily    anastrozole (ARIMIDEX) 1 mg Tab TAKE 1 TABLET BY MOUTH once daily    b complex vitamins tablet Take 1 tablet by mouth once daily.    calcium carbonate/vitamin D3 (CALCIUM 500 + D, D3, ORAL) Take 600 mg by mouth 2 (two) times a day.     denosumab (PROLIA) 60 mg/mL Syrg Inject 1 mL (60 mg total) into the skin every 6 (six) months.    hydroCHLOROthiazide (HYDRODIURIL) 25 MG tablet Take 1 tablet (25 mg total) by mouth once daily.     No current facility-administered medications on file prior to visit.       CBC:  Lab Results   Component Value Date    WBC 7.36 03/02/2022    HGB 15.2 03/02/2022    HCT 45.8 03/02/2022    MCV 90 03/02/2022     03/02/2022         CMP:  Sodium   Date Value Ref Range Status   03/08/2023 139 136 - 145 mmol/L Final     Potassium   Date Value Ref Range Status   03/08/2023 3.7 3.5 - 5.1 mmol/L Final     Chloride   Date Value Ref Range Status   03/08/2023 98 95 - 110 mmol/L Final     CO2   Date Value Ref Range Status   03/08/2023 33 (H) 22 - 31 mmol/L Final     Glucose   Date Value Ref Range Status   03/08/2023 90 70 - 110 mg/dL Final     Comment:     The ADA recommends the following guidelines for fasting glucose:    Normal:       less than 100 mg/dL    Prediabetes:  100 mg/dL to 125 mg/dL    Diabetes:     126 mg/dL or higher       BUN   Date Value Ref Range Status   03/08/2023 13 7 - 18 mg/dL Final     Creatinine   Date Value Ref Range Status   03/08/2023 0.80 0.50 - 1.40 mg/dL Final     Calcium   Date Value Ref Range Status   03/08/2023 9.3 8.4 - 10.2 mg/dL Final     Total Protein   Date Value Ref Range Status   03/08/2023 6.8 6.0 - 8.4 g/dL Final     Albumin   Date Value Ref Range Status   03/08/2023 4.0 3.5 - 5.2 g/dL Final     Total Bilirubin   Date Value Ref Range Status   03/08/2023 0.5 0.2 - 1.3 mg/dL Final     Alkaline Phosphatase   Date Value Ref Range  Status   03/08/2023 56 38 - 145 U/L Final     AST ([place])   Date Value Ref Range Status   01/18/2016 22 14 - 36 U/L Final     AST   Date Value Ref Range Status   03/08/2023 22 14 - 36 U/L Final     ALT   Date Value Ref Range Status   03/08/2023 21 0 - 35 U/L Final     Anion Gap   Date Value Ref Range Status   03/08/2023 8 8 - 16 mmol/L Final     eGFR if    Date Value Ref Range Status   03/02/2022 >60 >60 mL/min/1.73 m^2 Final     eGFR if non    Date Value Ref Range Status   03/02/2022 >60 >60 mL/min/1.73 m^2 Final     Comment:     Calculation used to obtain the estimated glomerular filtration  rate (eGFR) is the CKD-EPI equation.          BONE MIN DENSITY  Narrative: EXAMINATION:  DXA BONE DENSITY AXIAL SKELETON 1 OR MORE SITES    CLINICAL HISTORY:  Malignant neoplasm of overlapping sites of right female breast    COMPARISON:  02/17/2021    FINDINGS:  L1 to L4 vertebral bone mineral density is equal to 1.015 g/cm squared with a T score of -0.3.  This has not significantly changed compared to the prior study.    Femoral neck bone mineral density is equal to 0.610 g/cm squared with a T score of -2.2.  This has not significantly changed compared to the prior study.    There is a 18.0% risk of a major osteoporotic fracture and a 3.5% risk of hip fracture in the next 10 years (FRAX).  Impression: 1. Osteopenia according to WHO criteria.    Electronically signed by: Horacio Tom MD  Date:    03/08/2023  Time:    10:39         ECOG SCORE    0 - Fully active-able to carry on all pre-disease performance without restriction, 1 - Restricted in strenuous activity-ambulatory and able to carry out work of a light nature          All pertinent labs and imaging reviewed.    Assessment:       1. Chemotherapy induced neutropenia    2. Malignant neoplasm of overlapping sites of both breasts in female, estrogen receptor positive    3. Antineoplastic chemotherapy induced anemia    4. Aromatase  inhibitor use    5. Age-related osteoporosis without current pathological fracture    6. Class 2 severe obesity due to excess calories with serious comorbidity and body mass index (BMI) of 35.0 to 35.9 in adult      # Stage IIA IDC of the right breast s/p B/l Mastectomy   - ER/OK+, HER-2 negative, with N1 (micro) on the right side   - s/p adjuvant chemotherapy AC/T in 2018, started on anastrozole daily, plan fr 5 year  - discussed with patient the possibility of cont it for 7.5 -10 years given her stage II disease and the fact that she did not get XRT, will send out Breast cancer index to assist in the discussion about cont beyond 7 years   - last PET/CT 2022; explain that Pet might not be the best imaging to monitor breast cancer and will hold off getting routine imaging and just get if needed from symptoms   - Blood work with [facility], cont 6 months (for prolia) vs annually     # Osteoporosis:   - age related worsening with AI, on prolia, cont   - last DEXA scan in 2021, repeating one in 2/2023 with 18% major fracture and 3.5 % hip, previously was 18% and 3.9% improving     - cont with Vit D and calcium and prolia Q 6 months     # Chronic use of AI:  - stable symptoms, not worsening, stable, cont taking it   - sending out the BCI as it will be help in predicting risk of recurrent beyond five years and predicts the likely patient to repsone to hormone therapy, no oncotype was done and received adjuvant chemo     Plan:     Chemotherapy induced neutropenia    Malignant neoplasm of overlapping sites of both breasts in female, estrogen receptor positive    Antineoplastic chemotherapy induced anemia    Aromatase inhibitor use    Age-related osteoporosis without current pathological fracture    Class 2 severe obesity due to excess calories with serious comorbidity and body mass index (BMI) of 35.0 to 35.9 in adult          Patient queried and all questions were answered.    Plan was discussed with the patient at length, and  she verbalized understanding.    Recommend  exercise, nutrition and weight management and health maintenance activities and follow-up with PCPs recommendations     Route Chart for Scheduling    Med Onc Chart Routing      Follow up with physician 3 months.   Follow up with ISABELLA    Infusion scheduling note    Injection scheduling note    Labs    Imaging    Pharmacy appointment    Other referrals             Therapy Plan Information  5. Medications  denosumab (PROLIA) injection 60 mg  60 mg, Subcutaneous, Every visit        Signed:  Dang Benitez MD  Hematology and Oncology  [facility]

## 2023-03-21 ENCOUNTER — TELEPHONE (OUTPATIENT)
Dept: HEMATOLOGY/ONCOLOGY | Facility: CLINIC | Age: 68
End: 2023-03-21
Payer: COMMERCIAL

## 2023-03-21 NOTE — TELEPHONE ENCOUNTER
----- Message from Mandy Eddy sent at 3/21/2023 11:29 AM CDT -----  Type: Need Medical Advice   Who Called: Frederick Handley  (SUSAN)  Best callback number:    Additional Information: Calling in regards to a fax she received on 3/20 for the above patient   Please call to further assist, Thanks       Confirmed that breast cancer index is duplicate order. Per Dr Benitez will cancel since done September 2022.

## 2023-06-09 ENCOUNTER — OFFICE VISIT (OUTPATIENT)
Dept: HEMATOLOGY/ONCOLOGY | Facility: CLINIC | Age: 68
End: 2023-06-09
Payer: COMMERCIAL

## 2023-06-09 ENCOUNTER — TELEPHONE (OUTPATIENT)
Dept: HEMATOLOGY/ONCOLOGY | Facility: CLINIC | Age: 68
End: 2023-06-09
Payer: COMMERCIAL

## 2023-06-09 VITALS
HEIGHT: 64 IN | TEMPERATURE: 97 F | OXYGEN SATURATION: 97 % | BODY MASS INDEX: 35.04 KG/M2 | SYSTOLIC BLOOD PRESSURE: 138 MMHG | DIASTOLIC BLOOD PRESSURE: 70 MMHG | HEART RATE: 94 BPM | WEIGHT: 205.25 LBS | RESPIRATION RATE: 16 BRPM

## 2023-06-09 DIAGNOSIS — Z17.0 MALIGNANT NEOPLASM OF OVERLAPPING SITES OF BOTH BREASTS IN FEMALE, ESTROGEN RECEPTOR POSITIVE: Primary | ICD-10-CM

## 2023-06-09 DIAGNOSIS — M81.0 OSTEOPOROSIS, UNSPECIFIED OSTEOPOROSIS TYPE, UNSPECIFIED PATHOLOGICAL FRACTURE PRESENCE: ICD-10-CM

## 2023-06-09 DIAGNOSIS — C50.812 MALIGNANT NEOPLASM OF OVERLAPPING SITES OF BOTH BREASTS IN FEMALE, ESTROGEN RECEPTOR POSITIVE: Primary | ICD-10-CM

## 2023-06-09 DIAGNOSIS — C50.811 MALIGNANT NEOPLASM OF OVERLAPPING SITES OF BOTH BREASTS IN FEMALE, ESTROGEN RECEPTOR POSITIVE: Primary | ICD-10-CM

## 2023-06-09 DIAGNOSIS — Z79.811 AROMATASE INHIBITOR USE: ICD-10-CM

## 2023-06-09 DIAGNOSIS — E66.01 CLASS 2 SEVERE OBESITY DUE TO EXCESS CALORIES WITH SERIOUS COMORBIDITY AND BODY MASS INDEX (BMI) OF 35.0 TO 35.9 IN ADULT: ICD-10-CM

## 2023-06-09 DIAGNOSIS — M81.0 AGE-RELATED OSTEOPOROSIS WITHOUT CURRENT PATHOLOGICAL FRACTURE: ICD-10-CM

## 2023-06-09 PROCEDURE — 1160F RVW MEDS BY RX/DR IN RCRD: CPT | Mod: CPTII,S$GLB,, | Performed by: STUDENT IN AN ORGANIZED HEALTH CARE EDUCATION/TRAINING PROGRAM

## 2023-06-09 PROCEDURE — 3075F SYST BP GE 130 - 139MM HG: CPT | Mod: CPTII,S$GLB,, | Performed by: STUDENT IN AN ORGANIZED HEALTH CARE EDUCATION/TRAINING PROGRAM

## 2023-06-09 PROCEDURE — 3288F PR FALLS RISK ASSESSMENT DOCUMENTED: ICD-10-PCS | Mod: CPTII,S$GLB,, | Performed by: STUDENT IN AN ORGANIZED HEALTH CARE EDUCATION/TRAINING PROGRAM

## 2023-06-09 PROCEDURE — 1159F PR MEDICATION LIST DOCUMENTED IN MEDICAL RECORD: ICD-10-PCS | Mod: CPTII,S$GLB,, | Performed by: STUDENT IN AN ORGANIZED HEALTH CARE EDUCATION/TRAINING PROGRAM

## 2023-06-09 PROCEDURE — 3008F BODY MASS INDEX DOCD: CPT | Mod: CPTII,S$GLB,, | Performed by: STUDENT IN AN ORGANIZED HEALTH CARE EDUCATION/TRAINING PROGRAM

## 2023-06-09 PROCEDURE — 3078F DIAST BP <80 MM HG: CPT | Mod: CPTII,S$GLB,, | Performed by: STUDENT IN AN ORGANIZED HEALTH CARE EDUCATION/TRAINING PROGRAM

## 2023-06-09 PROCEDURE — 1101F PT FALLS ASSESS-DOCD LE1/YR: CPT | Mod: CPTII,S$GLB,, | Performed by: STUDENT IN AN ORGANIZED HEALTH CARE EDUCATION/TRAINING PROGRAM

## 2023-06-09 PROCEDURE — 99999 PR PBB SHADOW E&M-EST. PATIENT-LVL IV: CPT | Mod: PBBFAC,,, | Performed by: STUDENT IN AN ORGANIZED HEALTH CARE EDUCATION/TRAINING PROGRAM

## 2023-06-09 PROCEDURE — 1159F MED LIST DOCD IN RCRD: CPT | Mod: CPTII,S$GLB,, | Performed by: STUDENT IN AN ORGANIZED HEALTH CARE EDUCATION/TRAINING PROGRAM

## 2023-06-09 PROCEDURE — 1101F PR PT FALLS ASSESS DOC 0-1 FALLS W/OUT INJ PAST YR: ICD-10-PCS | Mod: CPTII,S$GLB,, | Performed by: STUDENT IN AN ORGANIZED HEALTH CARE EDUCATION/TRAINING PROGRAM

## 2023-06-09 PROCEDURE — 1160F PR REVIEW ALL MEDS BY PRESCRIBER/CLIN PHARMACIST DOCUMENTED: ICD-10-PCS | Mod: CPTII,S$GLB,, | Performed by: STUDENT IN AN ORGANIZED HEALTH CARE EDUCATION/TRAINING PROGRAM

## 2023-06-09 PROCEDURE — 3288F FALL RISK ASSESSMENT DOCD: CPT | Mod: CPTII,S$GLB,, | Performed by: STUDENT IN AN ORGANIZED HEALTH CARE EDUCATION/TRAINING PROGRAM

## 2023-06-09 PROCEDURE — 99999 PR PBB SHADOW E&M-EST. PATIENT-LVL IV: ICD-10-PCS | Mod: PBBFAC,,, | Performed by: STUDENT IN AN ORGANIZED HEALTH CARE EDUCATION/TRAINING PROGRAM

## 2023-06-09 PROCEDURE — 3008F PR BODY MASS INDEX (BMI) DOCUMENTED: ICD-10-PCS | Mod: CPTII,S$GLB,, | Performed by: STUDENT IN AN ORGANIZED HEALTH CARE EDUCATION/TRAINING PROGRAM

## 2023-06-09 PROCEDURE — 3075F PR MOST RECENT SYSTOLIC BLOOD PRESS GE 130-139MM HG: ICD-10-PCS | Mod: CPTII,S$GLB,, | Performed by: STUDENT IN AN ORGANIZED HEALTH CARE EDUCATION/TRAINING PROGRAM

## 2023-06-09 PROCEDURE — 99215 OFFICE O/P EST HI 40 MIN: CPT | Mod: S$GLB,,, | Performed by: STUDENT IN AN ORGANIZED HEALTH CARE EDUCATION/TRAINING PROGRAM

## 2023-06-09 PROCEDURE — 3044F PR MOST RECENT HEMOGLOBIN A1C LEVEL <7.0%: ICD-10-PCS | Mod: CPTII,S$GLB,, | Performed by: STUDENT IN AN ORGANIZED HEALTH CARE EDUCATION/TRAINING PROGRAM

## 2023-06-09 PROCEDURE — 3078F PR MOST RECENT DIASTOLIC BLOOD PRESSURE < 80 MM HG: ICD-10-PCS | Mod: CPTII,S$GLB,, | Performed by: STUDENT IN AN ORGANIZED HEALTH CARE EDUCATION/TRAINING PROGRAM

## 2023-06-09 PROCEDURE — 3044F HG A1C LEVEL LT 7.0%: CPT | Mod: CPTII,S$GLB,, | Performed by: STUDENT IN AN ORGANIZED HEALTH CARE EDUCATION/TRAINING PROGRAM

## 2023-06-09 PROCEDURE — 1126F PR PAIN SEVERITY QUANTIFIED, NO PAIN PRESENT: ICD-10-PCS | Mod: CPTII,S$GLB,, | Performed by: STUDENT IN AN ORGANIZED HEALTH CARE EDUCATION/TRAINING PROGRAM

## 2023-06-09 PROCEDURE — 1126F AMNT PAIN NOTED NONE PRSNT: CPT | Mod: CPTII,S$GLB,, | Performed by: STUDENT IN AN ORGANIZED HEALTH CARE EDUCATION/TRAINING PROGRAM

## 2023-06-09 PROCEDURE — 99215 PR OFFICE/OUTPT VISIT, EST, LEVL V, 40-54 MIN: ICD-10-PCS | Mod: S$GLB,,, | Performed by: STUDENT IN AN ORGANIZED HEALTH CARE EDUCATION/TRAINING PROGRAM

## 2023-06-09 NOTE — PROGRESS NOTES
Subjective:     Patient ID:    Name: Vilma Cross  : 1955  MRN: [id number]    HPI:   Vilma Cross is a 68 y.o. female presents for evaluation of Malignant neoplasm of overlapping sites of both breasts in  (3 month follow up)    Patient previously was seen Dr Mcintosh, last visit in 2021; diagnosed with stage II IDC in 2017 s/p B/L mastectomies by Dr Mcneil, pT2N1(mi)(sn) 1 of 3 LN positive on the right side (ER positivity, SC positivity, and HER-2 negativity by FISH). negative left senile Lymph node with left breast consistent focal low-grade LCIS and DCIS on the left side, but no invasive component. Completed ACx2/ T 12 weeks in 2108 and defer XRT. Since then patient have been on anastrazole daily sicne 2018     Today, patient is taking her anastrazole daily, minimal side effect,   - last PET/CT 3/2023 with [facility]   - blood work with no evidence of active disease.   - discussion about 5 vs 10 years  BCI to determine, given her LN+ would favorable 10 years , no benefit, will stop endocrine therapy     Oncology History   Malignant neoplasm of overlapping sites of both breasts in female, estrogen receptor positive   2017 Initial Diagnosis    Malignant neoplasm of overlapping sites of both breasts in female, estrogen receptor positive     2017 Cancer Staged    Staging form: Onc Breast AJCC V7  - Pathologic stage from 2017: Stage IIB (T2, N1, cM0)        Past Medical History:   Diagnosis Date    Breast cancer 2017    bilateral    Hyperlipidemia     no meds    Hypertension     Neuropathy of both feet     Neuropathy of finger     fingers and toes    Osteoporosis     Seasonal allergies        Past Surgical History:   Procedure Laterality Date    AUGMENTATION OF BREAST      BREAST BIOPSY Bilateral 2017    BREAST BIOPSY Right 2020    BREAST CYST ASPIRATION Left 2018    BREAST RECONSTRUCTION      BREAST SURGERY Bilateral 2017    reconstruction  - tissue expanders      BREAST SURGERY Left 07/13/2018    tissue expander replaced    CHOLECYSTECTOMY      INSERTION OF BREAST IMPLANT Bilateral 10/18/2018    Procedure: INSERTION, BREAST IMPLANT;  Surgeon: Nelly Alarcon MD;  Location: [facility];  Service: Plastics;  Laterality: Bilateral;    LIPOSUCTION Bilateral 10/18/2018    Procedure: LIPOSUCTION;  Surgeon: Nelly Alarcon MD;  Location: [facility];  Service: Plastics;  Laterality: Bilateral;    MASTECTOMY Bilateral 12/05/2017    with lynph node removed on right    portacath removal  08/2018    TISSUE EXPANDER PLACEMENT Left 7/13/2018    Procedure: INSERTION, TISSUE EXPANDER;  Surgeon: Nelly Alarcon MD;  Location: [facility];  Service: Plastics;  Laterality: Left;    TISSUE EXPANDER REMOVAL Left 7/13/2018    Procedure: REMOVAL, TISSUE EXPANDER;  Surgeon: Nelly Alarcon MD;  Location: [facility];  Service: Plastics;  Laterality: Left;    TISSUE EXPANDER REMOVAL Bilateral 10/18/2018    Procedure: REMOVAL, TISSUE EXPANDER;  Surgeon: Nelly Alarcon MD;  Location: [facility];  Service: Plastics;  Laterality: Bilateral;    TONSILLECTOMY      9 yo       Family History   Problem Relation Age of Onset    Heart disease Mother     Hyperlipidemia Mother     Heart disease Father     Hypertension Father     Hyperlipidemia Father     Stroke Father     Gout Father     Asthma Son        Social History     Socioeconomic History    Marital status:    Tobacco Use    Smoking status: Never    Smokeless tobacco: Never   Substance and Sexual Activity    Alcohol use: Yes     Alcohol/week: 0.0 standard drinks     Comment: Occasionaly: Daiquairi once per year    Drug use: No    Sexual activity: Yes     Partners: Male       Review of patient's allergies indicates:   Allergen Reactions    Hydromorphone Other (See Comments)     Numbness from the neck down, followed by severe nausea and vomiting    Penicillins Other (See Comments)     Sores in mouth, throat, and face       Review of Systems  "  Constitutional: Positive for weight gain. Negative for decreased appetite, fever and night sweats.   Eyes:  Negative for blurred vision, double vision, pain and visual disturbance.   Cardiovascular:  Negative for chest pain.   Respiratory:  Negative for cough and shortness of breath.    Hematologic/Lymphatic: Negative for adenopathy.   Skin:  Negative for rash.   Musculoskeletal:  Positive for muscle cramps and myalgias. Negative for back pain.   Gastrointestinal:  Negative for abdominal pain and diarrhea.   Genitourinary:  Negative for frequency.   Neurological:  Negative for headaches.   Psychiatric/Behavioral:  The patient is not nervous/anxious.           Objective:     Vitals:    06/09/23 1320   BP: 138/70   BP Location: Right arm   Patient Position: Sitting   BP Method: Medium (Manual)   Pulse: 94   Resp: 16   Temp: 97 °F (36.1 °C)   TempSrc: Temporal   SpO2: 97%   Weight: 93.1 kg (205 lb 4 oz)   Height: 5' 4" (1.626 m)       Physical Exam  Constitutional:       General: She is not in acute distress.     Appearance: Normal appearance. She is normal weight.   HENT:      Head: Normocephalic and atraumatic.   Cardiovascular:      Rate and Rhythm: Normal rate and regular rhythm.      Heart sounds: Normal heart sounds.   Pulmonary:      Effort: Pulmonary effort is normal.      Breath sounds: Normal breath sounds. No wheezing.   Chest:      Chest wall: No tenderness.   Abdominal:      General: Abdomen is flat. Bowel sounds are normal. There is no distension.      Palpations: Abdomen is soft. There is no mass.   Musculoskeletal:      Right lower leg: No edema.   Lymphadenopathy:      Cervical: No cervical adenopathy.   Skin:     Coloration: Skin is not jaundiced or pale.   Psychiatric:         Mood and Affect: Mood normal.         Behavior: Behavior normal.         Current Outpatient Medications on File Prior to Visit   Medication Sig    b complex vitamins tablet Take 1 tablet by mouth once daily.    calcium " carbonate/vitamin D3 (CALCIUM 500 + D, D3, ORAL) Take 600 mg by mouth 2 (two) times a day.     denosumab (PROLIA) 60 mg/mL Syrg Inject 1 mL (60 mg total) into the skin every 6 (six) months.     No current facility-administered medications on file prior to visit.       CBC:  Lab Results   Component Value Date    WBC 7.36 03/02/2022    HGB 15.2 03/02/2022    HCT 45.8 03/02/2022    MCV 90 03/02/2022     03/02/2022         CMP:  Sodium   Date Value Ref Range Status   06/22/2023 139 136 - 145 mmol/L Final     Potassium   Date Value Ref Range Status   06/22/2023 5.3 (H) 3.5 - 5.1 mmol/L Final     Comment:     Anion Gap reference range revised on 4/28/2023  Specimen moderately hemolyzed       Chloride   Date Value Ref Range Status   06/22/2023 103 95 - 110 mmol/L Final     CO2   Date Value Ref Range Status   06/22/2023 28 22 - 31 mmol/L Final     Glucose   Date Value Ref Range Status   06/22/2023 99 70 - 110 mg/dL Final     Comment:     The ADA recommends the following guidelines for fasting glucose:    Normal:       less than 100 mg/dL    Prediabetes:  100 mg/dL to 125 mg/dL    Diabetes:     126 mg/dL or higher       BUN   Date Value Ref Range Status   06/22/2023 13 7 - 18 mg/dL Final     Creatinine   Date Value Ref Range Status   06/22/2023 0.70 0.50 - 1.40 mg/dL Final     Calcium   Date Value Ref Range Status   06/22/2023 8.8 8.4 - 10.2 mg/dL Final     Total Protein   Date Value Ref Range Status   06/22/2023 7.4 6.0 - 8.4 g/dL Final     Albumin   Date Value Ref Range Status   06/22/2023 4.3 3.5 - 5.2 g/dL Final     Total Bilirubin   Date Value Ref Range Status   06/22/2023 0.9 0.2 - 1.3 mg/dL Final     Alkaline Phosphatase   Date Value Ref Range Status   06/22/2023 34 (L) 38 - 145 U/L Final     AST ([place])   Date Value Ref Range Status   01/18/2016 22 14 - 36 U/L Final     AST   Date Value Ref Range Status   06/22/2023 43 (H) 14 - 36 U/L Final     ALT   Date Value Ref Range Status   06/22/2023 24 0 - 35  U/L Final     Anion Gap   Date Value Ref Range Status   06/22/2023 8 mmol/L Final     Comment:     Anion Gap reference range revised on 4/28/2023     eGFR if    Date Value Ref Range Status   03/02/2022 >60 >60 mL/min/1.73 m^2 Final     eGFR if non    Date Value Ref Range Status   03/02/2022 >60 >60 mL/min/1.73 m^2 Final     Comment:     Calculation used to obtain the estimated glomerular filtration  rate (eGFR) is the CKD-EPI equation.          BONE MIN DENSITY  Narrative: EXAMINATION:  DXA BONE DENSITY AXIAL SKELETON 1 OR MORE SITES    CLINICAL HISTORY:  Malignant neoplasm of overlapping sites of right female breast    COMPARISON:  02/17/2021    FINDINGS:  L1 to L4 vertebral bone mineral density is equal to 1.015 g/cm squared with a T score of -0.3.  This has not significantly changed compared to the prior study.    Femoral neck bone mineral density is equal to 0.610 g/cm squared with a T score of -2.2.  This has not significantly changed compared to the prior study.    There is a 18.0% risk of a major osteoporotic fracture and a 3.5% risk of hip fracture in the next 10 years (FRAX).  Impression: 1. Osteopenia according to WHO criteria.    Electronically signed by: Horacio Tom MD  Date:    03/08/2023  Time:    10:39         ECOG SCORE              All pertinent labs and imaging reviewed.    Assessment:       1. Malignant neoplasm of overlapping sites of both breasts in female, estrogen receptor positive    2. Aromatase inhibitor use    3. Age-related osteoporosis without current pathological fracture    4. Class 2 severe obesity due to excess calories with serious comorbidity and body mass index (BMI) of 35.0 to 35.9 in adult    5. Osteoporosis, unspecified osteoporosis type, unspecified pathological fracture presence        # Stage IIA IDC of the right breast s/p B/l Mastectomy   - ER/KS+, HER-2 negative, with N1 (micro) on the right side   - s/p adjuvant chemotherapy AC/T in  2018, started on anastrozole daily, plan fr 5 year  - discussed with patient the possibility of cont it for 7.5 -10 years given her stage II disease and the fact that she did not get XRT,  Breast cancer index to assist in the discussion , no benefit, holding endocrine therapy   - last PET/CT 3/2023; explain that Pet might not be the best imaging to monitor breast cancer and will hold off getting routine imaging and just get if needed from symptoms   - Blood work with [facility], cont 6 months (for prolia) vs annually     # Osteoporosis:   - age related worsening with AI, on prolia, cont   - last DEXA scan in 2021, repeating one in 2/2023 with 18% major fracture and 3.5 % hip, previously was 18% and 3.9% improving     - cont with Vit D and calcium and prolia Q 6 months     # Chronic use of AI:  - stable symptoms, not worsening, stable, cont taking it   - sending out the BCI as it will be help in predicting risk of recurrent beyond five years and predicts the likely patient to repsone to hormone therapy, no oncotype was done and received adjuvant chemo     Plan:     Malignant neoplasm of overlapping sites of both breasts in female, estrogen receptor positive  -     Ambulatory referral/consult to Dermatology; Future; Expected date: 06/16/2023  -     CBC w/ DIFF; Future; Expected date: 06/09/2023  -     CMP; Future; Expected date: 06/09/2023    Aromatase inhibitor use    Age-related osteoporosis without current pathological fracture    Class 2 severe obesity due to excess calories with serious comorbidity and body mass index (BMI) of 35.0 to 35.9 in adult    Osteoporosis, unspecified osteoporosis type, unspecified pathological fracture presence          Patient queried and all questions were answered.    Plan was discussed with the patient at length, and she verbalized understanding.    Recommend  exercise, nutrition and weight management and health maintenance activities and follow-up with PCPs recommendations     Route Chart  for Scheduling    Med Onc Chart Routing      Follow up with physician 3 months. 9/8/23 with prolia injection CBC/CMP   Follow up with ISABELLA    Infusion scheduling note    Injection scheduling note    Labs    Imaging    Pharmacy appointment    Other referrals              Therapy Plan Information  5. Medications  denosumab (PROLIA) injection 60 mg  60 mg, Subcutaneous, Every visit        Signed:  Dang Benitez MD  Hematology and Oncology  [facility]

## 2023-06-09 NOTE — TELEPHONE ENCOUNTER
I called patient due to a dermatology referral that was placed by Dr hensley, I advised her that [facility] does not have any available new patient appts. I gave her four dermatologist, but she said she will call [facility]

## 2023-07-07 ENCOUNTER — PATIENT MESSAGE (OUTPATIENT)
Dept: HEMATOLOGY/ONCOLOGY | Facility: CLINIC | Age: 68
End: 2023-07-07
Payer: COMMERCIAL

## 2023-07-07 ENCOUNTER — TELEPHONE (OUTPATIENT)
Dept: HEMATOLOGY/ONCOLOGY | Facility: CLINIC | Age: 68
End: 2023-07-07
Payer: COMMERCIAL

## 2023-07-07 NOTE — TELEPHONE ENCOUNTER
LVM for patient to return call to the clinic to get appointments on 9/8 rescheduled with Dr Benitez earlier due to her not being in clinic that day. Call back number given.

## 2023-07-26 PROBLEM — R73.01 IMPAIRED FASTING BLOOD SUGAR: Status: ACTIVE | Noted: 2023-07-26

## 2023-07-26 PROBLEM — K76.0 STEATOSIS, LIVER: Status: ACTIVE | Noted: 2023-07-26

## 2023-07-26 PROBLEM — Z91.89 FRAMINGHAM CARDIAC RISK <10% IN NEXT 10 YEARS: Status: ACTIVE | Noted: 2023-07-26

## 2023-08-20 ENCOUNTER — PATIENT MESSAGE (OUTPATIENT)
Dept: HEMATOLOGY/ONCOLOGY | Facility: CLINIC | Age: 68
End: 2023-08-20
Payer: COMMERCIAL

## 2023-09-01 ENCOUNTER — INFUSION (OUTPATIENT)
Dept: INFUSION THERAPY | Facility: HOSPITAL | Age: 68
End: 2023-09-01
Attending: INTERNAL MEDICINE
Payer: COMMERCIAL

## 2023-09-01 ENCOUNTER — PATIENT MESSAGE (OUTPATIENT)
Dept: HEMATOLOGY/ONCOLOGY | Facility: CLINIC | Age: 68
End: 2023-09-01

## 2023-09-01 ENCOUNTER — OFFICE VISIT (OUTPATIENT)
Dept: HEMATOLOGY/ONCOLOGY | Facility: CLINIC | Age: 68
End: 2023-09-01
Payer: COMMERCIAL

## 2023-09-01 VITALS
TEMPERATURE: 98 F | RESPIRATION RATE: 16 BRPM | HEART RATE: 62 BPM | OXYGEN SATURATION: 96 % | DIASTOLIC BLOOD PRESSURE: 86 MMHG | WEIGHT: 203.69 LBS | BODY MASS INDEX: 34.77 KG/M2 | SYSTOLIC BLOOD PRESSURE: 140 MMHG | HEIGHT: 64 IN

## 2023-09-01 VITALS
BODY MASS INDEX: 34.77 KG/M2 | HEART RATE: 62 BPM | OXYGEN SATURATION: 96 % | TEMPERATURE: 98 F | SYSTOLIC BLOOD PRESSURE: 140 MMHG | RESPIRATION RATE: 16 BRPM | DIASTOLIC BLOOD PRESSURE: 86 MMHG | WEIGHT: 203.69 LBS | HEIGHT: 64 IN

## 2023-09-01 DIAGNOSIS — Z79.811 AROMATASE INHIBITOR USE: ICD-10-CM

## 2023-09-01 DIAGNOSIS — M81.0 AGE-RELATED OSTEOPOROSIS WITHOUT CURRENT PATHOLOGICAL FRACTURE: Primary | ICD-10-CM

## 2023-09-01 DIAGNOSIS — M81.0 AGE-RELATED OSTEOPOROSIS WITHOUT CURRENT PATHOLOGICAL FRACTURE: ICD-10-CM

## 2023-09-01 DIAGNOSIS — C50.811 MALIGNANT NEOPLASM OF OVERLAPPING SITES OF BOTH BREASTS IN FEMALE, ESTROGEN RECEPTOR POSITIVE: Primary | ICD-10-CM

## 2023-09-01 DIAGNOSIS — Z95.828 PORT-A-CATH IN PLACE: ICD-10-CM

## 2023-09-01 DIAGNOSIS — Z17.0 MALIGNANT NEOPLASM OF OVERLAPPING SITES OF BOTH BREASTS IN FEMALE, ESTROGEN RECEPTOR POSITIVE: Primary | ICD-10-CM

## 2023-09-01 DIAGNOSIS — C50.812 MALIGNANT NEOPLASM OF OVERLAPPING SITES OF BOTH BREASTS IN FEMALE, ESTROGEN RECEPTOR POSITIVE: Primary | ICD-10-CM

## 2023-09-01 DIAGNOSIS — M81.0 OSTEOPOROSIS, UNSPECIFIED OSTEOPOROSIS TYPE, UNSPECIFIED PATHOLOGICAL FRACTURE PRESENCE: ICD-10-CM

## 2023-09-01 PROCEDURE — 3079F DIAST BP 80-89 MM HG: CPT | Mod: CPTII,S$GLB,, | Performed by: STUDENT IN AN ORGANIZED HEALTH CARE EDUCATION/TRAINING PROGRAM

## 2023-09-01 PROCEDURE — 1160F PR REVIEW ALL MEDS BY PRESCRIBER/CLIN PHARMACIST DOCUMENTED: ICD-10-PCS | Mod: CPTII,S$GLB,, | Performed by: STUDENT IN AN ORGANIZED HEALTH CARE EDUCATION/TRAINING PROGRAM

## 2023-09-01 PROCEDURE — 96372 THER/PROPH/DIAG INJ SC/IM: CPT | Mod: PN

## 2023-09-01 PROCEDURE — 1160F RVW MEDS BY RX/DR IN RCRD: CPT | Mod: CPTII,S$GLB,, | Performed by: STUDENT IN AN ORGANIZED HEALTH CARE EDUCATION/TRAINING PROGRAM

## 2023-09-01 PROCEDURE — 99214 OFFICE O/P EST MOD 30 MIN: CPT | Mod: S$GLB,,, | Performed by: STUDENT IN AN ORGANIZED HEALTH CARE EDUCATION/TRAINING PROGRAM

## 2023-09-01 PROCEDURE — 3044F PR MOST RECENT HEMOGLOBIN A1C LEVEL <7.0%: ICD-10-PCS | Mod: CPTII,S$GLB,, | Performed by: STUDENT IN AN ORGANIZED HEALTH CARE EDUCATION/TRAINING PROGRAM

## 2023-09-01 PROCEDURE — 99999 PR PBB SHADOW E&M-EST. PATIENT-LVL IV: ICD-10-PCS | Mod: PBBFAC,,, | Performed by: STUDENT IN AN ORGANIZED HEALTH CARE EDUCATION/TRAINING PROGRAM

## 2023-09-01 PROCEDURE — 3077F SYST BP >= 140 MM HG: CPT | Mod: CPTII,S$GLB,, | Performed by: STUDENT IN AN ORGANIZED HEALTH CARE EDUCATION/TRAINING PROGRAM

## 2023-09-01 PROCEDURE — 63600175 PHARM REV CODE 636 W HCPCS: Mod: JZ,JG,PN | Performed by: STUDENT IN AN ORGANIZED HEALTH CARE EDUCATION/TRAINING PROGRAM

## 2023-09-01 PROCEDURE — 1101F PT FALLS ASSESS-DOCD LE1/YR: CPT | Mod: CPTII,S$GLB,, | Performed by: STUDENT IN AN ORGANIZED HEALTH CARE EDUCATION/TRAINING PROGRAM

## 2023-09-01 PROCEDURE — 3044F HG A1C LEVEL LT 7.0%: CPT | Mod: CPTII,S$GLB,, | Performed by: STUDENT IN AN ORGANIZED HEALTH CARE EDUCATION/TRAINING PROGRAM

## 2023-09-01 PROCEDURE — 1126F AMNT PAIN NOTED NONE PRSNT: CPT | Mod: CPTII,S$GLB,, | Performed by: STUDENT IN AN ORGANIZED HEALTH CARE EDUCATION/TRAINING PROGRAM

## 2023-09-01 PROCEDURE — 1101F PR PT FALLS ASSESS DOC 0-1 FALLS W/OUT INJ PAST YR: ICD-10-PCS | Mod: CPTII,S$GLB,, | Performed by: STUDENT IN AN ORGANIZED HEALTH CARE EDUCATION/TRAINING PROGRAM

## 2023-09-01 PROCEDURE — 99214 PR OFFICE/OUTPT VISIT, EST, LEVL IV, 30-39 MIN: ICD-10-PCS | Mod: S$GLB,,, | Performed by: STUDENT IN AN ORGANIZED HEALTH CARE EDUCATION/TRAINING PROGRAM

## 2023-09-01 PROCEDURE — 1126F PR PAIN SEVERITY QUANTIFIED, NO PAIN PRESENT: ICD-10-PCS | Mod: CPTII,S$GLB,, | Performed by: STUDENT IN AN ORGANIZED HEALTH CARE EDUCATION/TRAINING PROGRAM

## 2023-09-01 PROCEDURE — 99999 PR PBB SHADOW E&M-EST. PATIENT-LVL IV: CPT | Mod: PBBFAC,,, | Performed by: STUDENT IN AN ORGANIZED HEALTH CARE EDUCATION/TRAINING PROGRAM

## 2023-09-01 PROCEDURE — 1159F PR MEDICATION LIST DOCUMENTED IN MEDICAL RECORD: ICD-10-PCS | Mod: CPTII,S$GLB,, | Performed by: STUDENT IN AN ORGANIZED HEALTH CARE EDUCATION/TRAINING PROGRAM

## 2023-09-01 PROCEDURE — 3288F FALL RISK ASSESSMENT DOCD: CPT | Mod: CPTII,S$GLB,, | Performed by: STUDENT IN AN ORGANIZED HEALTH CARE EDUCATION/TRAINING PROGRAM

## 2023-09-01 PROCEDURE — 1159F MED LIST DOCD IN RCRD: CPT | Mod: CPTII,S$GLB,, | Performed by: STUDENT IN AN ORGANIZED HEALTH CARE EDUCATION/TRAINING PROGRAM

## 2023-09-01 PROCEDURE — 3077F PR MOST RECENT SYSTOLIC BLOOD PRESSURE >= 140 MM HG: ICD-10-PCS | Mod: CPTII,S$GLB,, | Performed by: STUDENT IN AN ORGANIZED HEALTH CARE EDUCATION/TRAINING PROGRAM

## 2023-09-01 PROCEDURE — 3008F BODY MASS INDEX DOCD: CPT | Mod: CPTII,S$GLB,, | Performed by: STUDENT IN AN ORGANIZED HEALTH CARE EDUCATION/TRAINING PROGRAM

## 2023-09-01 PROCEDURE — 3079F PR MOST RECENT DIASTOLIC BLOOD PRESSURE 80-89 MM HG: ICD-10-PCS | Mod: CPTII,S$GLB,, | Performed by: STUDENT IN AN ORGANIZED HEALTH CARE EDUCATION/TRAINING PROGRAM

## 2023-09-01 PROCEDURE — 3288F PR FALLS RISK ASSESSMENT DOCUMENTED: ICD-10-PCS | Mod: CPTII,S$GLB,, | Performed by: STUDENT IN AN ORGANIZED HEALTH CARE EDUCATION/TRAINING PROGRAM

## 2023-09-01 PROCEDURE — 3008F PR BODY MASS INDEX (BMI) DOCUMENTED: ICD-10-PCS | Mod: CPTII,S$GLB,, | Performed by: STUDENT IN AN ORGANIZED HEALTH CARE EDUCATION/TRAINING PROGRAM

## 2023-09-01 RX ADMIN — DENOSUMAB 60 MG: 60 INJECTION SUBCUTANEOUS at 10:09

## 2023-09-01 NOTE — PROGRESS NOTES
Subjective:     Patient ID:    Name: Vilma Cross  : 1955  MRN: [id number]    HPI:   Vilma Cross is a 68 y.o. female presents for evaluation of Breast Cancer    Patient previously was seen Dr Mcintosh, last visit in 2021; diagnosed with stage II IDC in 2017 s/p B/L mastectomies by Dr Mcneil, pT2N1(mi)(sn) 1 of 3 LN positive on the right side (ER positivity, IL positivity, and HER-2 negativity by FISH). negative left senile Lymph node with left breast consistent focal low-grade LCIS and DCIS on the left side, but no invasive component. Completed ACx2/ T 12 weeks in 2108 and defer XRT. Since then patient have been on anastrazole daily since 2018, stopped I in 2023     Today,   - last PET/CT 3/2023 with [facility]   - blood work with no evidence of active disease.   - discussion about 5 vs 10 years BCI to determine, given her LN+ would favorable 10 years , no benefit, stop endocrine therapy 2023 and doing well, will retired soon     Oncology History   Malignant neoplasm of overlapping sites of both breasts in female, estrogen receptor positive   2017 Initial Diagnosis    Malignant neoplasm of overlapping sites of both breasts in female, estrogen receptor positive     2017 Cancer Staged    Staging form: Onc Breast AJCC V7  - Pathologic stage from 2017: Stage IIB (T2, N1, cM0)        Past Medical History:   Diagnosis Date    Breast cancer 2017    bilateral    Hyperlipidemia     no meds    Hypertension     Neuropathy of both feet     Neuropathy of finger     fingers and toes    Osteoporosis     Seasonal allergies        Past Surgical History:   Procedure Laterality Date    AUGMENTATION OF BREAST      BREAST BIOPSY Bilateral 2017    BREAST BIOPSY Right     BREAST CYST ASPIRATION Left     BREAST RECONSTRUCTION      BREAST SURGERY Bilateral 2017    reconstruction  - tissue expanders     BREAST SURGERY Left 2018    tissue expander replaced     CHOLECYSTECTOMY      INSERTION OF BREAST IMPLANT Bilateral 10/18/2018    Procedure: INSERTION, BREAST IMPLANT;  Surgeon: Nelly Alarcon MD;  Location: [facility];  Service: Plastics;  Laterality: Bilateral;    LIPOSUCTION Bilateral 10/18/2018    Procedure: LIPOSUCTION;  Surgeon: Nelly Alarcon MD;  Location: [facility];  Service: Plastics;  Laterality: Bilateral;    MASTECTOMY Bilateral 12/05/2017    with lynph node removed on right    portacath removal  08/2018    skin cancer removal Left 06/2023    TISSUE EXPANDER PLACEMENT Left 07/13/2018    Procedure: INSERTION, TISSUE EXPANDER;  Surgeon: Nelly Alarcon MD;  Location: [facility];  Service: Plastics;  Laterality: Left;    TISSUE EXPANDER REMOVAL Left 07/13/2018    Procedure: REMOVAL, TISSUE EXPANDER;  Surgeon: Nelly Alarcon MD;  Location: [facility];  Service: Plastics;  Laterality: Left;    TISSUE EXPANDER REMOVAL Bilateral 10/18/2018    Procedure: REMOVAL, TISSUE EXPANDER;  Surgeon: Nelly Alarcon MD;  Location: [facility];  Service: Plastics;  Laterality: Bilateral;    TONSILLECTOMY      7 yo       Family History   Problem Relation Age of Onset    Heart disease Mother     Hyperlipidemia Mother     Heart disease Father     Hypertension Father     Hyperlipidemia Father     Stroke Father     Gout Father     Asthma Son        Social History     Socioeconomic History    Marital status:    Tobacco Use    Smoking status: Never    Smokeless tobacco: Never   Substance and Sexual Activity    Alcohol use: Yes     Alcohol/week: 0.0 standard drinks of alcohol     Comment: Occasionaly: Daiquairi once per year    Drug use: No    Sexual activity: Yes     Partners: Male       Review of patient's allergies indicates:   Allergen Reactions    Hydromorphone Other (See Comments)     Numbness from the neck down, followed by severe nausea and vomiting    Penicillins Other (See Comments)     Sores in mouth, throat, and face       Review of Systems   Constitutional: Positive  "for weight gain. Negative for decreased appetite, fever and night sweats.   Eyes:  Negative for blurred vision, double vision, pain and visual disturbance.   Cardiovascular:  Negative for chest pain.   Respiratory:  Negative for cough and shortness of breath.    Hematologic/Lymphatic: Negative for adenopathy.   Skin:  Negative for rash.   Musculoskeletal:  Positive for muscle cramps and myalgias. Negative for back pain.   Gastrointestinal:  Negative for abdominal pain and diarrhea.   Genitourinary:  Negative for frequency.   Neurological:  Negative for headaches.   Psychiatric/Behavioral:  The patient is not nervous/anxious.             Objective:     Vitals:    09/01/23 0948   BP: (!) 140/86   BP Location: Left arm   Patient Position: Sitting   BP Method: Medium (Automatic)   Pulse: 62   Resp: 16   Temp: 97.8 °F (36.6 °C)   TempSrc: Temporal   SpO2: 96%   Weight: 92.4 kg (203 lb 11.3 oz)   Height: 5' 4" (1.626 m)       Physical Exam  Constitutional:       General: She is not in acute distress.     Appearance: Normal appearance. She is normal weight.   HENT:      Head: Normocephalic and atraumatic.   Cardiovascular:      Rate and Rhythm: Normal rate and regular rhythm.      Heart sounds: Normal heart sounds.   Pulmonary:      Effort: Pulmonary effort is normal.      Breath sounds: Normal breath sounds. No wheezing.   Chest:      Chest wall: No tenderness.   Abdominal:      General: Abdomen is flat. Bowel sounds are normal. There is no distension.      Palpations: Abdomen is soft. There is no mass.   Musculoskeletal:      Right lower leg: No edema.   Lymphadenopathy:      Cervical: No cervical adenopathy.   Skin:     Coloration: Skin is not jaundiced or pale.   Psychiatric:         Mood and Affect: Mood normal.         Behavior: Behavior normal.         Current Outpatient Medications on File Prior to Visit   Medication Sig    amLODIPine (NORVASC) 10 MG tablet Take 1 tablet (10 mg total) by mouth once daily.    b " complex vitamins tablet Take 1 tablet by mouth once daily.    calcium carbonate/vitamin D3 (CALCIUM 500 + D, D3, ORAL) Take 600 mg by mouth 2 (two) times a day.     denosumab (PROLIA) 60 mg/mL Syrg Inject 1 mL (60 mg total) into the skin every 6 (six) months.    hydroCHLOROthiazide (HYDRODIURIL) 25 MG tablet Take 1 tablet (25 mg total) by mouth once daily.     Current Facility-Administered Medications on File Prior to Visit   Medication    [COMPLETED] denosumab (PROLIA) injection 60 mg       CBC:  Lab Results   Component Value Date    WBC 7.85 08/31/2023    HGB 14.2 08/31/2023    HCT 43.2 08/31/2023    MCV 93 08/31/2023     08/31/2023         CMP:  Sodium   Date Value Ref Range Status   08/31/2023 139 136 - 145 mmol/L Final     Potassium   Date Value Ref Range Status   08/31/2023 3.7 3.5 - 5.1 mmol/L Final     Comment:     Anion Gap reference range revised on 4/28/2023     Chloride   Date Value Ref Range Status   08/31/2023 99 95 - 110 mmol/L Final     CO2   Date Value Ref Range Status   08/31/2023 33 (H) 22 - 31 mmol/L Final     Glucose   Date Value Ref Range Status   08/31/2023 170 (H) 70 - 110 mg/dL Final     Comment:     The ADA recommends the following guidelines for fasting glucose:    Normal:       less than 100 mg/dL    Prediabetes:  100 mg/dL to 125 mg/dL    Diabetes:     126 mg/dL or higher       BUN   Date Value Ref Range Status   08/31/2023 15 7 - 18 mg/dL Final     Creatinine   Date Value Ref Range Status   08/31/2023 0.83 0.50 - 1.40 mg/dL Final     Calcium   Date Value Ref Range Status   08/31/2023 8.9 8.4 - 10.2 mg/dL Final     Total Protein   Date Value Ref Range Status   08/31/2023 6.5 6.0 - 8.4 g/dL Final     Albumin   Date Value Ref Range Status   08/31/2023 3.8 3.5 - 5.2 g/dL Final     Total Bilirubin   Date Value Ref Range Status   08/31/2023 0.4 0.2 - 1.3 mg/dL Final     Alkaline Phosphatase   Date Value Ref Range Status   08/31/2023 54 38 - 145 U/L Final     AST ([facility])   Date  Value Ref Range Status   01/18/2016 22 14 - 36 U/L Final     AST   Date Value Ref Range Status   08/31/2023 21 14 - 36 U/L Final     ALT   Date Value Ref Range Status   08/31/2023 22 0 - 35 U/L Final     Anion Gap   Date Value Ref Range Status   08/31/2023 7 5 - 12 mmol/L Final     Comment:     Anion Gap reference range revised on 4/28/2023     eGFR if    Date Value Ref Range Status   03/02/2022 >60 >60 mL/min/1.73 m^2 Final     eGFR if non    Date Value Ref Range Status   03/02/2022 >60 >60 mL/min/1.73 m^2 Final     Comment:     Calculation used to obtain the estimated glomerular filtration  rate (eGFR) is the CKD-EPI equation.          BONE MIN DENSITY  Narrative: EXAMINATION:  DXA BONE DENSITY AXIAL SKELETON 1 OR MORE SITES    CLINICAL HISTORY:  Malignant neoplasm of overlapping sites of right female breast    COMPARISON:  02/17/2021    FINDINGS:  L1 to L4 vertebral bone mineral density is equal to 1.015 g/cm squared with a T score of -0.3.  This has not significantly changed compared to the prior study.    Femoral neck bone mineral density is equal to 0.610 g/cm squared with a T score of -2.2.  This has not significantly changed compared to the prior study.    There is a 18.0% risk of a major osteoporotic fracture and a 3.5% risk of hip fracture in the next 10 years (FRAX).  Impression: 1. Osteopenia according to WHO criteria.    Electronically signed by: Horacio Tom MD  Date:    03/08/2023  Time:    10:39         ECOG SCORE              All pertinent labs and imaging reviewed.    Assessment:       1. Malignant neoplasm of overlapping sites of both breasts in female, estrogen receptor positive    2. Aromatase inhibitor use    3. Age-related osteoporosis without current pathological fracture    4. Osteoporosis, unspecified osteoporosis type, unspecified pathological fracture presence          # Stage IIA IDC of the right breast s/p B/l Mastectomy   - ER/ND+, HER-2 negative, with  N1 (micro) on the right side   - s/p adjuvant chemotherapy AC/T in 2018, started on anastrozole daily, plan fr 5 year, finished June/2023   - discussed with patient the possibility of cont it for 7.5 -10 years given her stage II disease and the fact that she did not get XRT,  Breast cancer index to assist in the discussion , no benefit, holding endocrine therapy ,finished June/2023   - last PET/CT 3/2023; explain that Pet might not be the best imaging to monitor breast cancer and will hold off getting routine imaging and just get if needed from symptoms   - Blood work with [facility], cont 6 months (for prolia) vs annually     # Osteoporosis:   - age related worsening with AI, on prolia, cont   - last DEXA scan in 2021, repeating one in 2/2023 with 18% major fracture and 3.5 % hip, previously was 18% and 3.9% improving     - cont with Vit D and calcium and prolia Q 6 months     Plan:     Malignant neoplasm of overlapping sites of both breasts in female, estrogen receptor positive    Aromatase inhibitor use    Age-related osteoporosis without current pathological fracture    Osteoporosis, unspecified osteoporosis type, unspecified pathological fracture presence            Patient queried and all questions were answered.    Plan was discussed with the patient at length, and she verbalized understanding.    Recommend  exercise, nutrition and weight management and health maintenance activities and follow-up with PCPs recommendations     Route Chart for Scheduling    Med Onc Chart Routing      Follow up with physician    Follow up with ISABELLA 6 months. [facility] CMP prior   Infusion scheduling note   Prolia   Injection scheduling note    Labs    Imaging    Pharmacy appointment    Other referrals                Therapy Plan Information  5. Medications  denosumab (PROLIA) injection 60 mg  60 mg, Subcutaneous, Every visit        Signed:  Dang Benitez MD  Hematology and Oncology  [facility]

## 2024-05-20 NOTE — PLAN OF CARE
----- Message from Gemma Emily sent at 5/20/2024  4:14 PM EDT -----  Subject: Refill Request    QUESTIONS  Name of Medication? ANORO ELLIPTA 62.5-25 MCG/ACT inhaler  Patient-reported dosage and instructions? once a day  How many days do you have left? 3  Preferred Pharmacy? [id number]  Pharmacy phone number (if available)? [phone]  ---------------------------------------------------------------------------  --------------  CALL BACK INFO  What is the best way for the office to contact you? OK to leave message on   voicemail  Preferred Call Back Phone Number? [phone]  ---------------------------------------------------------------------------  --------------  SCRIPT ANSWERS  Relationship to Patient? Self   Pt presented to injection room for her prolia.  POC discussed  Verbally acknowledged understanding

## 2024-07-10 PROBLEM — E66.812 CLASS 2 SEVERE OBESITY DUE TO EXCESS CALORIES WITH SERIOUS COMORBIDITY AND BODY MASS INDEX (BMI) OF 35.0 TO 35.9 IN ADULT: Status: ACTIVE | Noted: 2024-07-10

## 2024-07-10 PROBLEM — E66.01 CLASS 2 SEVERE OBESITY DUE TO EXCESS CALORIES WITH SERIOUS COMORBIDITY AND BODY MASS INDEX (BMI) OF 35.0 TO 35.9 IN ADULT: Status: ACTIVE | Noted: 2024-07-10

## 2024-09-18 ENCOUNTER — TELEPHONE (OUTPATIENT)
Dept: HEMATOLOGY/ONCOLOGY | Facility: CLINIC | Age: 69
End: 2024-09-18
Payer: MEDICARE

## 2024-09-18 NOTE — TELEPHONE ENCOUNTER
Left message to call the office to schedule/reschedule appt. Recall number provided.  ----- Message from Tri Heaton sent at 9/18/2024  3:00 PM CDT -----  Regarding: Appt  Contact: Pt  [phone]              Appt Type:  New Patient  prefers to be scheduled with a female provider      Date/Time Preference:  Next wil      Referring  Provider:      Caller Name: Vilma      Contact Prefer:  [phone]    Comments/Notes: Called to schedule appt from referral for the [place] location    C50.811,C50.812,Z17.0 (ICD-10-CM) - Malignant neoplasm of overlapping sites of both breasts in female, estrogen receptor positive

## 2024-09-19 ENCOUNTER — TELEPHONE (OUTPATIENT)
Dept: HEMATOLOGY/ONCOLOGY | Facility: CLINIC | Age: 69
End: 2024-09-19
Payer: MEDICARE

## 2024-09-19 NOTE — TELEPHONE ENCOUNTER
Left message to call the office to schedule/reschedule appt. Recall number provided.  ----- Message from Juan Villa LPN sent at 9/18/2024  4:10 PM CDT -----  Regarding: FW: return call  Contact: patient    ----- Message -----  From: Isabel Flores  Sent: 9/18/2024   4:07 PM CDT  To: [facility] Navigation Outpatient  Subject: return call                                      Type:  Patient Returning Call    Who Called:  patient  Who Left Message for Patient:  Jazmine  Does the patient know what this is regarding?:  appointment  Best Call Back Number:  [phone] (home)     Additional Information:  Please call patient to advise.  Thanks!

## 2024-10-01 ENCOUNTER — TELEPHONE (OUTPATIENT)
Dept: HEMATOLOGY/ONCOLOGY | Facility: CLINIC | Age: 69
End: 2024-10-01
Payer: MEDICARE

## 2024-10-01 NOTE — TELEPHONE ENCOUNTER
Spoke with the pt and I stated to the pt that it looks like the referral is for another hem/onc. Pt verbalized and understanding.  ----- Message from Alberta sent at 9/30/2024 12:09 PM CDT -----  Type:  Patient Returning Call    Who Called:  Patient   Who Left Message for Patient:  Jazmine  Does the patient know what this is regarding?:  yes  Best Call Back Number: [phone]   Additional Information:  Patient returning missed call

## 2024-10-07 ENCOUNTER — TELEPHONE (OUTPATIENT)
Dept: HEMATOLOGY/ONCOLOGY | Facility: CLINIC | Age: 69
End: 2024-10-07
Payer: MEDICARE

## 2024-10-07 NOTE — NURSING
"[facility] referral noted in [facility]. Chart reviewed. Per notes, patient has noted "something suspicious while doing a breast self exam" and may want a diagnostic u/s. Previously saw Dr. Benitez 9/1/2023.     LVM with contact information. Requested return call to discuss scheduling a NP appointment.   "

## 2024-10-09 ENCOUNTER — PATIENT MESSAGE (OUTPATIENT)
Dept: HEMATOLOGY/ONCOLOGY | Facility: CLINIC | Age: 69
End: 2024-10-09
Payer: MEDICARE

## 2024-10-09 ENCOUNTER — TELEPHONE (OUTPATIENT)
Dept: HEMATOLOGY/ONCOLOGY | Facility: CLINIC | Age: 69
End: 2024-10-09
Payer: MEDICARE

## 2024-10-10 ENCOUNTER — TELEPHONE (OUTPATIENT)
Dept: HEMATOLOGY/ONCOLOGY | Facility: CLINIC | Age: 69
End: 2024-10-10
Payer: MEDICARE

## 2024-10-10 DIAGNOSIS — C50.811 MALIGNANT NEOPLASM OF OVERLAPPING SITES OF BOTH BREASTS IN FEMALE, ESTROGEN RECEPTOR POSITIVE: Primary | ICD-10-CM

## 2024-10-10 DIAGNOSIS — C50.812 MALIGNANT NEOPLASM OF OVERLAPPING SITES OF BOTH BREASTS IN FEMALE, ESTROGEN RECEPTOR POSITIVE: Primary | ICD-10-CM

## 2024-10-10 DIAGNOSIS — Z17.0 MALIGNANT NEOPLASM OF OVERLAPPING SITES OF BOTH BREASTS IN FEMALE, ESTROGEN RECEPTOR POSITIVE: Primary | ICD-10-CM

## 2024-10-10 NOTE — NURSING
"Rec'd return call from Ms. Chavez. Explained my role as oncology navigator.     We discussed her oncologic history. She was established here at [facility] with both Dr. Mcintosh and Dr. Benitez. Hx bilateral breast ca with bilateral implants. She states she has noted "something concerning" on her breast self exam and states she noticed a "lump."    Scheduled her to see 1st available provider - Dr. Camara - as established extended patient on 10/16.     Discussed with Dr. Camara via secure chat. She would like to order a bilateral diagnostic mammogram as well as an ultrasound on the side of the lump. Will f/u with Ms. Chavez on 10/11 to schedule this imaging prior to her upcoming visit.     LVM confirming apt date/time/location on 10/16.   "

## 2024-10-11 ENCOUNTER — TELEPHONE (OUTPATIENT)
Dept: HEMATOLOGY/ONCOLOGY | Facility: CLINIC | Age: 69
End: 2024-10-11
Payer: MEDICARE

## 2024-10-11 DIAGNOSIS — C50.812 MALIGNANT NEOPLASM OF OVERLAPPING SITES OF BOTH BREASTS IN FEMALE, ESTROGEN RECEPTOR POSITIVE: Primary | ICD-10-CM

## 2024-10-11 DIAGNOSIS — Z17.0 MALIGNANT NEOPLASM OF OVERLAPPING SITES OF BOTH BREASTS IN FEMALE, ESTROGEN RECEPTOR POSITIVE: Primary | ICD-10-CM

## 2024-10-11 DIAGNOSIS — C50.811 MALIGNANT NEOPLASM OF OVERLAPPING SITES OF BOTH BREASTS IN FEMALE, ESTROGEN RECEPTOR POSITIVE: Primary | ICD-10-CM

## 2024-10-11 NOTE — TELEPHONE ENCOUNTER
----- Message from Alberta sent at 10/11/2024  9:34 AM CDT -----  Type:  Patient Returning Call    Who Called:  Patient   Who Left Message for Patient:  Kati  Does the patient know what this is regarding?:  yes  Best Call Back Number:    Additional Information:  Patient returning missed call

## 2024-10-11 NOTE — NURSING
Discussed breast u/s and diagnostic mammo orders with GEGE Pinedo at [facility] [place]. Scheduled breast u/s on 10/15. Spot held for diagnostic mammogram should radiologist feel it is required.     LVM with details of upcoming breast u/s on Tuesday 10/15 at [facility]. My contact information provided. Requested return call with any questions.

## 2024-10-16 ENCOUNTER — OFFICE VISIT (OUTPATIENT)
Dept: HEMATOLOGY/ONCOLOGY | Facility: CLINIC | Age: 69
End: 2024-10-16
Payer: MEDICARE

## 2024-10-16 VITALS
HEIGHT: 64 IN | RESPIRATION RATE: 17 BRPM | OXYGEN SATURATION: 96 % | TEMPERATURE: 98 F | HEART RATE: 85 BPM | BODY MASS INDEX: 32.9 KG/M2 | WEIGHT: 192.69 LBS | SYSTOLIC BLOOD PRESSURE: 134 MMHG | DIASTOLIC BLOOD PRESSURE: 79 MMHG

## 2024-10-16 DIAGNOSIS — C50.812 MALIGNANT NEOPLASM OF OVERLAPPING SITES OF BOTH BREASTS IN FEMALE, ESTROGEN RECEPTOR POSITIVE: ICD-10-CM

## 2024-10-16 DIAGNOSIS — E66.09 CLASS 1 OBESITY DUE TO EXCESS CALORIES WITH SERIOUS COMORBIDITY AND BODY MASS INDEX (BMI) OF 33.0 TO 33.9 IN ADULT: ICD-10-CM

## 2024-10-16 DIAGNOSIS — E66.811 CLASS 1 OBESITY DUE TO EXCESS CALORIES WITH SERIOUS COMORBIDITY AND BODY MASS INDEX (BMI) OF 33.0 TO 33.9 IN ADULT: ICD-10-CM

## 2024-10-16 DIAGNOSIS — C50.811 MALIGNANT NEOPLASM OF OVERLAPPING SITES OF BOTH BREASTS IN FEMALE, ESTROGEN RECEPTOR POSITIVE: ICD-10-CM

## 2024-10-16 DIAGNOSIS — Z17.0 MALIGNANT NEOPLASM OF OVERLAPPING SITES OF BOTH BREASTS IN FEMALE, ESTROGEN RECEPTOR POSITIVE: ICD-10-CM

## 2024-10-16 DIAGNOSIS — Z98.890 S/P BREAST RECONSTRUCTION: Primary | ICD-10-CM

## 2024-10-16 DIAGNOSIS — M85.89 OSTEOPENIA OF MULTIPLE SITES: ICD-10-CM

## 2024-10-16 PROBLEM — D64.81 ANTINEOPLASTIC CHEMOTHERAPY INDUCED ANEMIA: Status: RESOLVED | Noted: 2017-12-22 | Resolved: 2024-10-16

## 2024-10-16 PROBLEM — T45.1X5A ANTINEOPLASTIC CHEMOTHERAPY INDUCED ANEMIA: Status: RESOLVED | Noted: 2017-12-22 | Resolved: 2024-10-16

## 2024-10-16 PROBLEM — D70.1 CHEMOTHERAPY INDUCED NEUTROPENIA: Status: RESOLVED | Noted: 2017-12-22 | Resolved: 2024-10-16

## 2024-10-16 PROBLEM — E66.01 CLASS 2 SEVERE OBESITY DUE TO EXCESS CALORIES WITH SERIOUS COMORBIDITY AND BODY MASS INDEX (BMI) OF 35.0 TO 35.9 IN ADULT: Status: RESOLVED | Noted: 2024-07-10 | Resolved: 2024-10-16

## 2024-10-16 PROBLEM — M81.0 OSTEOPOROSIS: Status: RESOLVED | Noted: 2019-02-08 | Resolved: 2024-10-16

## 2024-10-16 PROBLEM — T45.1X5A CHEMOTHERAPY INDUCED NEUTROPENIA: Status: RESOLVED | Noted: 2017-12-22 | Resolved: 2024-10-16

## 2024-10-16 PROBLEM — E66.812 CLASS 2 SEVERE OBESITY DUE TO EXCESS CALORIES WITH SERIOUS COMORBIDITY AND BODY MASS INDEX (BMI) OF 35.0 TO 35.9 IN ADULT: Status: RESOLVED | Noted: 2024-07-10 | Resolved: 2024-10-16

## 2024-10-16 PROBLEM — Z79.811 AROMATASE INHIBITOR USE: Status: RESOLVED | Noted: 2022-03-08 | Resolved: 2024-10-16

## 2024-10-16 PROCEDURE — 99999 PR PBB SHADOW E&M-EST. PATIENT-LVL III: CPT | Mod: PBBFAC,,, | Performed by: INTERNAL MEDICINE

## 2024-10-16 NOTE — PROGRESS NOTES
"Subjective:     Patient ID:    Name: Vilma Cross  : 1955  MRN: [id number]    HPI:   Vilma Cross is a 69 y.o. female presents for evaluation of Breast Cancer (hx breast ca, recent "lump" on R breast noted on self exam//*prev est with Dr. Benitez/)    Patient was seen by Dr Mcintosh and .  She was diagnosed with stage II IDC in 2017 s/p B/L mastectomies by Dr Mcneil, pT2N1(mi)(sn) 1 of 3 LN positive on the right side (ER positivity, [place] positivity, and HER-2 negativity by FISH). negative left senile Lymph node with left breast consistent focal low-grade LCIS and DCIS on the left side, but no invasive component. Completed ACx2/ T 12 weeks in 2108 and defer XRT. Since then patient have been on anastrazole daily since 2018, stopped I in 2023     - last PET/CT 3/2023 with [facility]   - blood work with no evidence of active disease.   - discussion about 5 vs 10 years BCI to determine, given her LN+ would favorable 10 years , no benefit, stop endocrine therapy 2023.      Today, 10/16/2024  -This is my first encounter with her.  She was initially seen by Dr. Benitez and  for her breast cancer.   Cancer Staging   Malignant neoplasm of overlapping sites of both breasts in female, estrogen receptor positive  Staging form: Onc Breast AJCC V7  - Pathologic stage from 2017: Stage IIB (T2, N1, cM0) - Signed by Dang Benitez MD on 3/8/2022  - Clinical: No stage assigned - Unsigned      The patient denies CP, cough, SOB, abdominal pain, nausea, vomiting, constipation.  The patient denies fever, chills, night sweats, weight loss, new lumps or bumps, easy bruising or bleeding.      Oncology History   Malignant neoplasm of overlapping sites of both breasts in female, estrogen receptor positive   2017 Initial Diagnosis    Malignant neoplasm of overlapping sites of both breasts in female, estrogen receptor positive     2017 Cancer Staged    Staging form: Onc Breast AJCC " V7  - Pathologic stage from 12/5/2017: Stage IIB (T2, N1, cM0)        Past Medical History:   Diagnosis Date    Breast cancer 11/2017    bilateral    Hyperlipidemia     no meds    Hypertension     Neuropathy of both feet     Neuropathy of finger     fingers and toes    Osteoporosis     Seasonal allergies        Past Surgical History:   Procedure Laterality Date    AUGMENTATION OF BREAST      BREAST BIOPSY Bilateral 2017    BREAST BIOPSY Right 2020    BREAST CYST ASPIRATION Left 2018    BREAST RECONSTRUCTION      BREAST SURGERY Bilateral 12/05/2017    reconstruction  - tissue expanders     BREAST SURGERY Left 07/13/2018    tissue expander replaced    CHOLECYSTECTOMY      INSERTION OF BREAST IMPLANT Bilateral 10/18/2018    Procedure: INSERTION, BREAST IMPLANT;  Surgeon: Nelly Alarcon MD;  Location: [facility];  Service: Plastics;  Laterality: Bilateral;    LIPOSUCTION Bilateral 10/18/2018    Procedure: LIPOSUCTION;  Surgeon: Nelly Alarcon MD;  Location: [facility];  Service: Plastics;  Laterality: Bilateral;    MASTECTOMY Bilateral 12/05/2017    with lynph node removed on right    portacath removal  08/2018    skin cancer removal Left 06/2023    TISSUE EXPANDER PLACEMENT Left 07/13/2018    Procedure: INSERTION, TISSUE EXPANDER;  Surgeon: Nelly Alarcon MD;  Location: [facility];  Service: Plastics;  Laterality: Left;    TISSUE EXPANDER REMOVAL Left 07/13/2018    Procedure: REMOVAL, TISSUE EXPANDER;  Surgeon: Nelly Alarcon MD;  Location: [facility];  Service: Plastics;  Laterality: Left;    TISSUE EXPANDER REMOVAL Bilateral 10/18/2018    Procedure: REMOVAL, TISSUE EXPANDER;  Surgeon: Nelly Alarcon MD;  Location: [facility];  Service: Plastics;  Laterality: Bilateral;    TONSILLECTOMY      7 yo       Family History   Problem Relation Name Age of Onset    Heart disease Mother      Hyperlipidemia Mother      Heart disease Father      Hypertension Father      Hyperlipidemia Father      Stroke Father      Gout Father    "   Asthma Son         Social History     Socioeconomic History    Marital status:    Tobacco Use    Smoking status: Never    Smokeless tobacco: Never   Substance and Sexual Activity    Alcohol use: Yes     Alcohol/week: 0.0 standard drinks of alcohol     Comment: Occasionaly: Daiquairi once per year    Drug use: No    Sexual activity: Yes     Partners: Male       Review of patient's allergies indicates:   Allergen Reactions    Hydromorphone Other (See Comments)     Numbness from the neck down, followed by severe nausea and vomiting    Penicillins Other (See Comments)     Sores in mouth, throat, and face       Review of Systems   Constitutional: Positive for weight gain. Negative for decreased appetite, fever and night sweats.   Eyes:  Negative for blurred vision, double vision, pain and visual disturbance.   Cardiovascular:  Negative for chest pain.   Respiratory:  Negative for cough and shortness of breath.    Hematologic/Lymphatic: Negative for adenopathy.   Skin:  Negative for rash.   Musculoskeletal:  Positive for muscle cramps and myalgias. Negative for back pain.   Gastrointestinal:  Negative for abdominal pain and diarrhea.   Genitourinary:  Negative for frequency.   Neurological:  Negative for headaches.   Psychiatric/Behavioral:  The patient is not nervous/anxious.             Objective:     Vitals:    10/16/24 1117   BP: 134/79   BP Location: Right arm   Patient Position: Sitting   Pulse: 85   Resp: 17   Temp: 97.6 °F (36.4 °C)   TempSrc: Temporal   SpO2: 96%   Weight: 87.4 kg (192 lb 10.9 oz)   Height: 5' 4" (1.626 m)       Physical Exam  Constitutional:       General: She is not in acute distress.     Appearance: Normal appearance. She is normal weight.   HENT:      Head: Normocephalic and atraumatic.   Cardiovascular:      Rate and Rhythm: Normal rate and regular rhythm.      Heart sounds: Normal heart sounds.   Pulmonary:      Effort: Pulmonary effort is normal.      Breath sounds: Normal breath " sounds. No wheezing.   Chest:      Chest wall: No tenderness.   Abdominal:      General: Abdomen is flat. Bowel sounds are normal. There is no distension.      Palpations: Abdomen is soft. There is no mass.   Musculoskeletal:      Right lower leg: No edema.   Lymphadenopathy:      Cervical: No cervical adenopathy.   Skin:     Coloration: Skin is not jaundiced or pale.   Psychiatric:         Mood and Affect: Mood normal.         Behavior: Behavior normal.         Current Outpatient Medications on File Prior to Visit   Medication Sig    amLODIPine (NORVASC) 10 MG tablet Take 1 tablet (10 mg total) by mouth once daily.    b complex vitamins tablet Take 1 tablet by mouth once daily.    calcium carbonate/vitamin D3 (CALCIUM 500 + D, D3, ORAL) Take 600 mg by mouth 2 (two) times a day.     hydroCHLOROthiazide (HYDRODIURIL) 25 MG tablet Take 1 tablet (25 mg total) by mouth once daily.     No current facility-administered medications on file prior to visit.       CBC:  Lab Results   Component Value Date    WBC 7.81 08/02/2024    HGB 14.9 08/02/2024    HCT 44.2 08/02/2024    MCV 90 08/02/2024     08/02/2024         CMP:  Sodium   Date Value Ref Range Status   08/02/2024 141 136 - 145 mmol/L Final     Potassium   Date Value Ref Range Status   08/02/2024 3.7 3.5 - 5.1 mmol/L Final     Comment:     Anion Gap reference range revised on 4/28/2023     Chloride   Date Value Ref Range Status   08/02/2024 105 95 - 110 mmol/L Final     CO2   Date Value Ref Range Status   08/02/2024 25 22 - 31 mmol/L Final     Glucose   Date Value Ref Range Status   08/02/2024 105 70 - 110 mg/dL Final     Comment:     The ADA recommends the following guidelines for fasting glucose:    Normal:       less than 100 mg/dL    Prediabetes:  100 mg/dL to 125 mg/dL    Diabetes:     126 mg/dL or higher       BUN   Date Value Ref Range Status   08/02/2024 21 (H) 7 - 18 mg/dL Final     Creatinine   Date Value Ref Range Status   08/02/2024 0.71 0.50 - 1.40  mg/dL Final     Calcium   Date Value Ref Range Status   08/02/2024 9.7 8.4 - 10.2 mg/dL Final     Total Protein   Date Value Ref Range Status   08/02/2024 7.3 6.0 - 8.4 g/dL Final     Albumin   Date Value Ref Range Status   08/02/2024 4.5 3.5 - 5.2 g/dL Final     Total Bilirubin   Date Value Ref Range Status   08/02/2024 0.7 0.2 - 1.3 mg/dL Final     Alkaline Phosphatase   Date Value Ref Range Status   08/02/2024 85 38 - 145 U/L Final     AST ([place])   Date Value Ref Range Status   01/18/2016 22 14 - 36 U/L Final     AST   Date Value Ref Range Status   08/02/2024 23 14 - 36 U/L Final     ALT   Date Value Ref Range Status   08/02/2024 21 0 - 35 U/L Final     Anion Gap   Date Value Ref Range Status   08/02/2024 11 5 - 12 mmol/L Final     Comment:     Anion Gap reference range revised on 4/28/2023     eGFR if    Date Value Ref Range Status   03/02/2022 >60 >60 mL/min/1.73 m^2 Final     eGFR if non    Date Value Ref Range Status   03/02/2022 >60 >60 mL/min/1.73 m^2 Final     Comment:     Calculation used to obtain the estimated glomerular filtration  rate (eGFR) is the CKD-EPI equation.          US Breast Right Complete  Narrative: Facility:  [address] [id number] [phone]    Name: Vilma Cross    MRN: [id number]    Result:   US Breast Right Complete     History:  Patient is 69 y.o. and is seen for a right 5:00-6:00 lump.    She has a personal history of bilateral breast cancer (right lower outer   quadrant IDC, right upper outer quadrant ILC, left DCIS, one right   sentinel lymph node with positive ILC micromet) diagnosed in 2017 post   bilateral mastectomy and tissue expander placement. Her LEFT tissue   expander was replaced in August 2020 with complication requiring a seroma   aspiration. followed by replacement with silicone implants.     Films Compared:  Compared to: 08/18/2022 US Breast  Bilateral Complete     Findings:   All four quadrants and the axillary regions were scanned.    There are post mastectomy with implant reconstruction changes.    The palpable area of concern at the right central, 5 cm from the center,   corresponds to focally calcified capsule and implant. This corresponds to   my findings on targeted clinical breast examination.      No sonographic suspicious lesions are seen. No abnormal lymph nodes are   seen in the axilla.     The radiologist scanned the left breast for comparison.  Impression: There is no sonographic evidence of malignancy in the right breast.    BI-RADS Category:   Overall: 2 - Benign       Recommendation:  Screening Breast Ultrasound or MRI in 1 year     I discussed the findings and recommendation in detail with Vilma Cross at the time of the study and reassured her.    Lori Mcwilliams MD         ECOG SCORE    0 - Fully active-able to carry on all pre-disease performance without restriction          All pertinent labs and imaging reviewed.    Assessment:       1. S/P breast reconstruction    2. Malignant neoplasm of overlapping sites of both breasts in female, estrogen receptor positive    3. Class 1 obesity due to excess calories with serious comorbidity and body mass index (BMI) of 33.0 to 33.9 in adult    4. Osteopenia of multiple sites          Stage IIA IDC of the right breast s/p B/l Mastectomy   - ER/NC+, HER-2 negative, with N1 (micro) on the right side   - s/p adjuvant chemotherapy AC/T in 2018, started on anastrozole daily, plan fr 5 year, finished June/2023   - discussed with patient the possibility of cont it for 7.5 -10 years given her stage II disease and the fact that she did not get XRT,  Breast cancer index to assist in the discussion , no benefit, holding endocrine therapy ,finished June/2023   - last PET/CT 3/2023; explain that Pet might not be the best imaging to monitor breast cancer and will hold off getting routine  imaging and just get if needed from symptoms   -US done on October 15, 2024 showed calcification at the site of her implant.  There was no signs of local recurrence of her disease.  -she will be scheduled to undergo a bilateral breast ultrasound in 1 year.  - Blood work with [facility].    Osteoporosis:   - age related worsening with AI, on prolia, cont   - last DEXA scan in 2021, repeating one in 2/2023 with 18% major fracture and 3.5 % hip, previously was 18% and 3.9% improving     -she will have a repeat bone density in February 20, 2025.  - cont with Vit D and calcium and prolia Q 6 months     Obesity BMI 33.07  -A higher BMI and/or perimenopausal weight gain have been consistently associated with a higher risk of breast cancer among postmenopausal women.The association between a higher BMI and postmenopausal breast cancer risk may be mediated by higher estrogen levels resulting from the peripheral conversion of estrogen precursors (from adipose tissue) to estrogen. Overweight, Obesity, and Postmenopausal Invasive Breast Cancer Risk: A Secondary Analysis of the Women's Health Initiative Randomized Clinical Trials.  CARMINA Oncol. 2015;1(5):611.   In I discussed these findings the patient was advised to exercise maintain healthy lifestyle and to lose weight.     Plan:     S/P breast reconstruction  -     US BREAST SCREENING BILATERAL; Future; Expected date: 10/16/2025    Malignant neoplasm of overlapping sites of both breasts in female, estrogen receptor positive  -     Ambulatory referral/consult to Hematology / Oncology  -     US BREAST SCREENING BILATERAL; Future; Expected date: 10/16/2025    Class 1 obesity due to excess calories with serious comorbidity and body mass index (BMI) of 33.0 to 33.9 in adult    Osteopenia of multiple sites            Patient queried and all questions were answered.    Plan was discussed with the patient at length, and she verbalized understanding.    Recommend  exercise, nutrition and  weight management and health maintenance activities and follow-up with PCPs recommendations     Route Chart for Scheduling    Med Onc Chart Routing      Follow up with physician 1 year. with repeat bilateral breast US   Follow up with ISABELLA    Infusion scheduling note    Injection scheduling note    Labs    Imaging    Pharmacy appointment    Other referrals                           Signed:  Saima Camara MD  Hematology and Oncology  [facility]

## 2024-11-15 ENCOUNTER — TELEPHONE (OUTPATIENT)
Dept: RADIOLOGY | Facility: HOSPITAL | Age: 69
End: 2024-11-15
Payer: MEDICARE